# Patient Record
Sex: FEMALE | Race: WHITE | NOT HISPANIC OR LATINO | Employment: FULL TIME | URBAN - METROPOLITAN AREA
[De-identification: names, ages, dates, MRNs, and addresses within clinical notes are randomized per-mention and may not be internally consistent; named-entity substitution may affect disease eponyms.]

---

## 2017-01-27 ENCOUNTER — TRANSCRIBE ORDERS (OUTPATIENT)
Dept: ADMINISTRATIVE | Facility: HOSPITAL | Age: 62
End: 2017-01-27

## 2017-01-27 DIAGNOSIS — E89.40: ICD-10-CM

## 2017-01-27 DIAGNOSIS — Z13.820 SCREENING FOR OSTEOPOROSIS: Primary | ICD-10-CM

## 2017-02-24 ENCOUNTER — OFFICE VISIT (OUTPATIENT)
Dept: NEUROLOGY | Facility: CLINIC | Age: 62
End: 2017-02-24

## 2017-02-24 ENCOUNTER — APPOINTMENT (OUTPATIENT)
Dept: LAB | Facility: HOSPITAL | Age: 62
End: 2017-02-24

## 2017-02-24 VITALS
HEART RATE: 62 BPM | WEIGHT: 141.4 LBS | SYSTOLIC BLOOD PRESSURE: 122 MMHG | OXYGEN SATURATION: 98 % | BODY MASS INDEX: 26.02 KG/M2 | DIASTOLIC BLOOD PRESSURE: 76 MMHG | HEIGHT: 62 IN

## 2017-02-24 DIAGNOSIS — G25.0 ESSENTIAL TREMOR: ICD-10-CM

## 2017-02-24 DIAGNOSIS — G35 MULTIPLE SCLEROSIS (HCC): Primary | ICD-10-CM

## 2017-02-24 LAB
ALBUMIN SERPL-MCNC: 4.3 G/DL (ref 3.2–4.8)
ALBUMIN/GLOB SERPL: 1.5 G/DL (ref 1.5–2.5)
ALP SERPL-CCNC: 74 U/L (ref 25–100)
ALT SERPL W P-5'-P-CCNC: 20 U/L (ref 7–40)
ANION GAP SERPL CALCULATED.3IONS-SCNC: 8 MMOL/L (ref 3–11)
AST SERPL-CCNC: 27 U/L (ref 0–33)
BASOPHILS # BLD AUTO: 0.03 10*3/MM3 (ref 0–0.2)
BASOPHILS NFR BLD AUTO: 0.5 % (ref 0–1)
BILIRUB SERPL-MCNC: 0.4 MG/DL (ref 0.3–1.2)
BUN BLD-MCNC: 11 MG/DL (ref 9–23)
BUN/CREAT SERPL: 18.3 (ref 7–25)
CALCIUM SPEC-SCNC: 9.5 MG/DL (ref 8.7–10.4)
CHLORIDE SERPL-SCNC: 107 MMOL/L (ref 99–109)
CO2 SERPL-SCNC: 25 MMOL/L (ref 20–31)
CREAT BLD-MCNC: 0.6 MG/DL (ref 0.6–1.3)
DEPRECATED RDW RBC AUTO: 42.2 FL (ref 37–54)
EOSINOPHIL # BLD AUTO: 0.1 10*3/MM3 (ref 0.1–0.3)
EOSINOPHIL NFR BLD AUTO: 1.7 % (ref 0–3)
ERYTHROCYTE [DISTWIDTH] IN BLOOD BY AUTOMATED COUNT: 13 % (ref 11.3–14.5)
GFR SERPL CREATININE-BSD FRML MDRD: 101 ML/MIN/1.73
GLOBULIN UR ELPH-MCNC: 2.9 GM/DL
GLUCOSE BLD-MCNC: 82 MG/DL (ref 70–100)
HCT VFR BLD AUTO: 40.1 % (ref 34.5–44)
HGB BLD-MCNC: 13.9 G/DL (ref 11.5–15.5)
IMM GRANULOCYTES # BLD: 0.02 10*3/MM3 (ref 0–0.03)
IMM GRANULOCYTES NFR BLD: 0.3 % (ref 0–0.6)
LYMPHOCYTES # BLD AUTO: 1.26 10*3/MM3 (ref 0.6–4.8)
LYMPHOCYTES NFR BLD AUTO: 21.4 % (ref 24–44)
MCH RBC QN AUTO: 31 PG (ref 27–31)
MCHC RBC AUTO-ENTMCNC: 34.7 G/DL (ref 32–36)
MCV RBC AUTO: 89.3 FL (ref 80–99)
MONOCYTES # BLD AUTO: 0.64 10*3/MM3 (ref 0–1)
MONOCYTES NFR BLD AUTO: 10.9 % (ref 0–12)
NEUTROPHILS # BLD AUTO: 3.84 10*3/MM3 (ref 1.5–8.3)
NEUTROPHILS NFR BLD AUTO: 65.2 % (ref 41–71)
PLATELET # BLD AUTO: 175 10*3/MM3 (ref 150–450)
PMV BLD AUTO: 10.9 FL (ref 6–12)
POTASSIUM BLD-SCNC: 4.2 MMOL/L (ref 3.5–5.5)
PROT SERPL-MCNC: 7.2 G/DL (ref 5.7–8.2)
RBC # BLD AUTO: 4.49 10*6/MM3 (ref 3.89–5.14)
SODIUM BLD-SCNC: 140 MMOL/L (ref 132–146)
WBC NRBC COR # BLD: 5.89 10*3/MM3 (ref 3.5–10.8)

## 2017-02-24 PROCEDURE — 85025 COMPLETE CBC W/AUTO DIFF WBC: CPT | Performed by: PSYCHIATRY & NEUROLOGY

## 2017-02-24 PROCEDURE — 99213 OFFICE O/P EST LOW 20 MIN: CPT | Performed by: PSYCHIATRY & NEUROLOGY

## 2017-02-24 PROCEDURE — 36415 COLL VENOUS BLD VENIPUNCTURE: CPT | Performed by: PSYCHIATRY & NEUROLOGY

## 2017-02-24 PROCEDURE — 80053 COMPREHEN METABOLIC PANEL: CPT | Performed by: PSYCHIATRY & NEUROLOGY

## 2017-02-24 RX ORDER — RENAGEL 800 MG/1
1 TABLET ORAL DAILY
COMMUNITY
Start: 2017-01-30 | End: 2017-11-24 | Stop reason: SDUPTHER

## 2017-02-24 NOTE — PROGRESS NOTES
Subjective:     Patient ID: Nicky Owens is a 61 y.o. female.    History of Present Illness     59 yo woman with RRMS and essential tremors returns in follow up.  Last visit on 12/16/16 started Aubagio, primidone.    MRI Brain/cervical 11/11/16 minimal T2 lesion load and C1 lesion unchanged, no new, enlarging or enhancing lesions in comparison to 9/28/15    CBC 12/16/16 lymph 790  CMP - NCS   TB - neg    RRMS    Fatigue improved off Tecfidera.  Aubagio for last 15 days.  Appetite improved off Tecfidera.   Ring worm biopsied and extreme dermatitis.   Sensations in legs/feet of muscles twitching and cramping only slight.       Essential Tremor    Stopped Primidone 25 mg BID as tremor has resolved.        The following portions of the patient's history were reviewed and updated as appropriate: allergies, current medications, past medical history, past surgical history and problem list.    Review of Systems   Constitutional: Negative for activity change and unexpected weight change.   HENT: Negative for tinnitus and trouble swallowing.    Eyes: Negative for photophobia and visual disturbance.   Respiratory: Negative for apnea and choking.    Cardiovascular: Negative for leg swelling.   Endocrine: Positive for heat intolerance. Negative for cold intolerance.   Genitourinary: Negative for difficulty urinating, frequency, menstrual problem and urgency.   Musculoskeletal: Negative for back pain and gait problem.   Skin: Negative for color change.   Allergic/Immunologic: Negative for immunocompromised state.   Neurological: Negative for dizziness, tremors, seizures, syncope, facial asymmetry, speech difficulty and light-headedness.   Hematological: Negative for adenopathy. Does not bruise/bleed easily.   Psychiatric/Behavioral: Negative for behavioral problems, confusion, decreased concentration, hallucinations and sleep disturbance.        Objective:  Vitals:    02/24/17 1336   BP: 122/76   Pulse: 62   SpO2: 98%  "  Weight: 141 lb 6.4 oz (64.1 kg)   Height: 62\" (157.5 cm)       Neurologic Exam     Mental Status   Attention: normal. Concentration: normal.   Level of consciousness: alert  Knowledge: good and consistent with education.   Normal comprehension.     Cranial Nerves     CN II   Visual fields full to confrontation.   Visual acuity: normal  Right visual field deficit: none  Left visual field deficit: none     CN III, IV, VI   Nystagmus: none   Diplopia: none  Ophthalmoparesis: none  Upgaze: normal  Downgaze: normal  Conjugate gaze: present    CN V   Facial sensation intact.   Right corneal reflex: normal  Left corneal reflex: normal    CN VII   Right facial weakness: none  Left facial weakness: none    CN VIII   Hearing: intact    CN IX, X   Palate: symmetric  Right gag reflex: normal  Left gag reflex: normal    CN XI   Right sternocleidomastoid strength: normal  Left sternocleidomastoid strength: normal    CN XII   Tongue: not atrophic  Fasciculations: absent  Tongue deviation: none    Motor Exam   Muscle bulk: normal  Overall muscle tone: normal  Right arm tone: normal  Left arm tone: normal  Right leg tone: normal  Left leg tone: normal    Sensory Exam   Light touch normal.     Gait, Coordination, and Reflexes     Tremor   Resting tremor: absent  Intention tremor: absent  Action tremor: absent    Reflexes   Reflexes 2+ except as noted.       Physical Exam    Assessment/Plan:       Problems Addressed this Visit        Nervous and Auditory    Essential tremor    Multiple sclerosis - Primary     Tolerating Aubagio without side effects.      CBC, CMP         Relevant Orders    CBC & Differential    Comprehensive Metabolic Panel                   "

## 2017-05-08 ENCOUNTER — APPOINTMENT (OUTPATIENT)
Dept: LAB | Facility: HOSPITAL | Age: 62
End: 2017-05-08

## 2017-05-08 ENCOUNTER — OFFICE VISIT (OUTPATIENT)
Dept: NEUROLOGY | Facility: CLINIC | Age: 62
End: 2017-05-08

## 2017-05-08 VITALS
BODY MASS INDEX: 25.65 KG/M2 | DIASTOLIC BLOOD PRESSURE: 74 MMHG | HEART RATE: 62 BPM | WEIGHT: 139.4 LBS | OXYGEN SATURATION: 97 % | SYSTOLIC BLOOD PRESSURE: 122 MMHG | HEIGHT: 62 IN

## 2017-05-08 DIAGNOSIS — R25.1 TREMOR: ICD-10-CM

## 2017-05-08 DIAGNOSIS — G35 MULTIPLE SCLEROSIS (HCC): Primary | ICD-10-CM

## 2017-05-08 LAB
ALBUMIN SERPL-MCNC: 4.2 G/DL (ref 3.2–4.8)
ALBUMIN/GLOB SERPL: 1.5 G/DL (ref 1.5–2.5)
ALP SERPL-CCNC: 73 U/L (ref 25–100)
ALT SERPL W P-5'-P-CCNC: 26 U/L (ref 7–40)
ANION GAP SERPL CALCULATED.3IONS-SCNC: 5 MMOL/L (ref 3–11)
AST SERPL-CCNC: 27 U/L (ref 0–33)
BASOPHILS # BLD AUTO: 0.04 10*3/MM3 (ref 0–0.2)
BASOPHILS NFR BLD AUTO: 0.9 % (ref 0–1)
BILIRUB SERPL-MCNC: 0.5 MG/DL (ref 0.3–1.2)
BUN BLD-MCNC: 11 MG/DL (ref 9–23)
BUN/CREAT SERPL: 15.7 (ref 7–25)
CALCIUM SPEC-SCNC: 9.3 MG/DL (ref 8.7–10.4)
CHLORIDE SERPL-SCNC: 110 MMOL/L (ref 99–109)
CO2 SERPL-SCNC: 28 MMOL/L (ref 20–31)
CREAT BLD-MCNC: 0.7 MG/DL (ref 0.6–1.3)
DEPRECATED RDW RBC AUTO: 41.7 FL (ref 37–54)
EOSINOPHIL # BLD AUTO: 0.09 10*3/MM3 (ref 0.1–0.3)
EOSINOPHIL NFR BLD AUTO: 1.9 % (ref 0–3)
ERYTHROCYTE [DISTWIDTH] IN BLOOD BY AUTOMATED COUNT: 12.8 % (ref 11.3–14.5)
GFR SERPL CREATININE-BSD FRML MDRD: 85 ML/MIN/1.73
GLOBULIN UR ELPH-MCNC: 2.8 GM/DL
GLUCOSE BLD-MCNC: 88 MG/DL (ref 70–100)
HCT VFR BLD AUTO: 38.2 % (ref 34.5–44)
HGB BLD-MCNC: 12.9 G/DL (ref 11.5–15.5)
IMM GRANULOCYTES # BLD: 0.01 10*3/MM3 (ref 0–0.03)
IMM GRANULOCYTES NFR BLD: 0.2 % (ref 0–0.6)
LYMPHOCYTES # BLD AUTO: 0.83 10*3/MM3 (ref 0.6–4.8)
LYMPHOCYTES NFR BLD AUTO: 17.8 % (ref 24–44)
MCH RBC QN AUTO: 30.4 PG (ref 27–31)
MCHC RBC AUTO-ENTMCNC: 33.8 G/DL (ref 32–36)
MCV RBC AUTO: 89.9 FL (ref 80–99)
MONOCYTES # BLD AUTO: 0.64 10*3/MM3 (ref 0–1)
MONOCYTES NFR BLD AUTO: 13.7 % (ref 0–12)
NEUTROPHILS # BLD AUTO: 3.05 10*3/MM3 (ref 1.5–8.3)
NEUTROPHILS NFR BLD AUTO: 65.5 % (ref 41–71)
PLATELET # BLD AUTO: 177 10*3/MM3 (ref 150–450)
PMV BLD AUTO: 12.1 FL (ref 6–12)
POTASSIUM BLD-SCNC: 4.1 MMOL/L (ref 3.5–5.5)
PROT SERPL-MCNC: 7 G/DL (ref 5.7–8.2)
RBC # BLD AUTO: 4.25 10*6/MM3 (ref 3.89–5.14)
SODIUM BLD-SCNC: 143 MMOL/L (ref 132–146)
WBC NRBC COR # BLD: 4.66 10*3/MM3 (ref 3.5–10.8)

## 2017-05-08 PROCEDURE — 36415 COLL VENOUS BLD VENIPUNCTURE: CPT | Performed by: PSYCHIATRY & NEUROLOGY

## 2017-05-08 PROCEDURE — 99214 OFFICE O/P EST MOD 30 MIN: CPT | Performed by: PSYCHIATRY & NEUROLOGY

## 2017-05-08 PROCEDURE — 85025 COMPLETE CBC W/AUTO DIFF WBC: CPT | Performed by: PSYCHIATRY & NEUROLOGY

## 2017-05-08 PROCEDURE — 80053 COMPREHEN METABOLIC PANEL: CPT | Performed by: PSYCHIATRY & NEUROLOGY

## 2017-06-23 ENCOUNTER — OFFICE VISIT (OUTPATIENT)
Dept: NEUROLOGY | Facility: CLINIC | Age: 62
End: 2017-06-23

## 2017-06-23 VITALS
OXYGEN SATURATION: 96 % | SYSTOLIC BLOOD PRESSURE: 120 MMHG | BODY MASS INDEX: 25.76 KG/M2 | WEIGHT: 140 LBS | DIASTOLIC BLOOD PRESSURE: 72 MMHG | HEART RATE: 62 BPM | HEIGHT: 62 IN

## 2017-06-23 DIAGNOSIS — R25.1 TREMOR: ICD-10-CM

## 2017-06-23 DIAGNOSIS — G25.0 ESSENTIAL TREMOR: ICD-10-CM

## 2017-06-23 DIAGNOSIS — G35 MULTIPLE SCLEROSIS (HCC): Primary | ICD-10-CM

## 2017-06-23 PROCEDURE — 99213 OFFICE O/P EST LOW 20 MIN: CPT | Performed by: PSYCHIATRY & NEUROLOGY

## 2017-06-23 NOTE — PROGRESS NOTES
Subjective:     Patient ID: Nicky Owens is a 61 y.o. female.    History of Present Illness     61 y.o.  yo woman with RRMS and essential tremors returns in follow up.  Last visit on 5/8/17 continued Aubagio, primidone and ordered labs.    MRI Brain/cervical 11/11/16 minimal T2 lesion load and C1 lesion unchanged, no new, enlarging or enhancing lesions in comparison to 9/28/15     5/8/17  CBC, CMP - NCS        RRMS    Left arm and leg N/T markedly decreased and occurs intermittently. Fatigue is mild.  Bladder increased urgency and frequency continues.  Denies heat intolerance.  Aubagio side effects are minimal.      Essential Tremor    Tremor resolved off medication    The following portions of the patient's history were reviewed and updated as appropriate: allergies, current medications, past medical history, past surgical history and problem list.    Review of Systems   Constitutional: Positive for fatigue. Negative for activity change and unexpected weight change.   HENT: Negative for tinnitus and trouble swallowing.    Eyes: Negative for photophobia and visual disturbance.   Respiratory: Negative for apnea and choking.    Cardiovascular: Negative for leg swelling.   Endocrine: Positive for heat intolerance. Negative for cold intolerance.   Genitourinary: Negative for difficulty urinating, frequency, menstrual problem and urgency.   Musculoskeletal: Negative for back pain and gait problem.   Skin: Negative for color change.   Allergic/Immunologic: Negative for immunocompromised state.   Neurological: Positive for tremors. Negative for dizziness, seizures, syncope, facial asymmetry, speech difficulty and light-headedness.   Hematological: Negative for adenopathy. Does not bruise/bleed easily.   Psychiatric/Behavioral: Negative for behavioral problems, confusion, decreased concentration, hallucinations and sleep disturbance.        Objective:  Vitals:    06/23/17 1354   BP: 120/72   Pulse: 62   SpO2: 96%  "  Weight: 140 lb (63.5 kg)   Height: 62\" (157.5 cm)       Neurologic Exam     Mental Status   Oriented to person, place, and time.   Registration: recalls 3 of 3 objects. Recall at 5 minutes: recalls 3 of 3 objects.   Attention: normal. Concentration: normal.   Speech: speech is normal   Level of consciousness: alert  Knowledge: good and consistent with education.   Normal comprehension.     Cranial Nerves     CN II   Visual fields full to confrontation.   Visual acuity: normal  Right visual field deficit: none  Left visual field deficit: none     CN III, IV, VI   Nystagmus: none   Diplopia: none  Ophthalmoparesis: none  Upgaze: normal  Downgaze: normal  Conjugate gaze: present    CN V   Facial sensation intact.   Right corneal reflex: normal  Left corneal reflex: normal    CN VII   Right facial weakness: none  Left facial weakness: none    CN VIII   Hearing: intact    CN IX, X   Palate: symmetric  Right gag reflex: normal  Left gag reflex: normal    CN XI   Right sternocleidomastoid strength: normal  Left sternocleidomastoid strength: normal    CN XII   Tongue: not atrophic  Fasciculations: absent  Tongue deviation: none    Motor Exam   Muscle bulk: normal  Overall muscle tone: normal  Right arm tone: normal  Left arm tone: normal  Right leg tone: normal  Left leg tone: normal    Sensory Exam   Light touch normal.     Gait, Coordination, and Reflexes     Tremor   Resting tremor: absent  Intention tremor: absent  Action tremor: absent    Reflexes   Reflexes 2+ except as noted.       Physical Exam   Constitutional: She is oriented to person, place, and time.   Neurological: She is oriented to person, place, and time.   Psychiatric: Her speech is normal.       Assessment/Plan:       Problems Addressed this Visit        Nervous and Auditory    Essential tremor     Stable off meds         Multiple sclerosis - Primary     Continue on Aubagio              Other    Tremor                   "

## 2017-08-11 ENCOUNTER — OFFICE VISIT (OUTPATIENT)
Dept: ENDOCRINOLOGY | Facility: CLINIC | Age: 62
End: 2017-08-11

## 2017-08-11 VITALS
WEIGHT: 138.1 LBS | HEART RATE: 58 BPM | OXYGEN SATURATION: 99 % | HEIGHT: 62 IN | SYSTOLIC BLOOD PRESSURE: 118 MMHG | BODY MASS INDEX: 25.41 KG/M2 | DIASTOLIC BLOOD PRESSURE: 70 MMHG

## 2017-08-11 DIAGNOSIS — E03.9 ACQUIRED HYPOTHYROIDISM: ICD-10-CM

## 2017-08-11 DIAGNOSIS — E78.2 MIXED HYPERLIPIDEMIA: ICD-10-CM

## 2017-08-11 DIAGNOSIS — I10 BENIGN ESSENTIAL HYPERTENSION: Primary | ICD-10-CM

## 2017-08-11 DIAGNOSIS — E55.9 VITAMIN D DEFICIENCY: ICD-10-CM

## 2017-08-11 LAB
25(OH)D3 SERPL-MCNC: 24.1 NG/ML
ARTICHOKE IGE QN: 161 MG/DL (ref 0–130)
CHOLEST SERPL-MCNC: 225 MG/DL (ref 0–200)
HDLC SERPL-MCNC: 46 MG/DL (ref 40–60)
T4 FREE SERPL-MCNC: 1.19 NG/DL (ref 0.89–1.76)
TRIGL SERPL-MCNC: 106 MG/DL (ref 0–150)
TSH SERPL DL<=0.05 MIU/L-ACNC: 4.24 MIU/ML (ref 0.35–5.35)

## 2017-08-11 PROCEDURE — 80061 LIPID PANEL: CPT | Performed by: INTERNAL MEDICINE

## 2017-08-11 PROCEDURE — 84443 ASSAY THYROID STIM HORMONE: CPT | Performed by: INTERNAL MEDICINE

## 2017-08-11 PROCEDURE — 82306 VITAMIN D 25 HYDROXY: CPT | Performed by: INTERNAL MEDICINE

## 2017-08-11 PROCEDURE — 99214 OFFICE O/P EST MOD 30 MIN: CPT | Performed by: INTERNAL MEDICINE

## 2017-08-11 PROCEDURE — 84439 ASSAY OF FREE THYROXINE: CPT | Performed by: INTERNAL MEDICINE

## 2017-08-11 NOTE — PROGRESS NOTES
Nicky Owens 61 y.o.  CC:Hypertension (Follow UP ) and Hypothyroidism    [place]: Hypertension (Follow UP ) and Hypothyroidism    bp is good   Energy is good as well   Is on abagio now for MS - felt better (less gi upset and more energy)  Moved over the summer  Sister stem cell transplant- myelodyplastic syndrome (older brother was a match)    Allergies   Allergen Reactions   • Penicillins        Current Outpatient Prescriptions:   •  AUBAGIO 14 MG tablet, Take 1 tablet by mouth Daily., Disp: , Rfl:   •  esomeprazole (nexIUM) 40 MG capsule, Take 1 capsule by mouth Daily., Disp: 30 capsule, Rfl: 5  •  estradiol (MINIVELLE, VIVELLE-DOT) 0.05 MG/24HR patch, Place 1 patch on the skin 1 (One) Time Per Week., Disp: , Rfl:   •  levothyroxine (SYNTHROID) 75 MCG tablet, Take 1 tablet by mouth Daily., Disp: 30 tablet, Rfl: 5  •  lisinopril (PRINIVIL,ZESTRIL) 10 MG tablet, Take 1 tablet by mouth Daily., Disp: 30 tablet, Rfl: 5  •  simvastatin (ZOCOR) 20 MG tablet, Take 1 tablet by mouth Daily., Disp: 90 tablet, Rfl: 1  Patient Active Problem List    Diagnosis   • Bruising [T14.8]   • Acute gastritis [K29.00]   • Abdominal pain RUQ [R10.9]   • Visit for screening mammogram [Z12.31]   • Ischemic stroke [I63.9]   • Anemia [D64.9]     Overview Note:     Impression: 06/08/2015 - stable cbc;      • Anorexia [R63.0]     Overview Note:     Impression: 06/08/2015 - stable weight and increase in appetite  Impression: 05/18/2015 - add ppi for 2-3 weeks;      • Anxiety [F41.9]     Overview Note:     Impression: 05/26/2015 - patient denies but in constant motion in office, appears agitated  tremor to outstretched hands  discussed change to bystolic for bp control and use valium prn   continue to advance activity as tolerated  contipation improving  stop minivelle and simvastatin for now;      • Benign essential hypertension [I10]     Overview Note:     Impression: 06/08/2015 - bp is good off medication  Impression: 04/03/2015 - bp is good   Impression: 07/05/2014 - bp is good today- continue to monitor;      • Bone cyst [M85.60]     Overview Note:     Description: hip     • H/O colonoscopy [Z98.890]     Overview Note:     Description: 2012 chris     • Degeneration of intervertebral disc of lumbar region [M51.36]   • Dysphagia [R13.10]     Overview Note:     Description: egd 9/15 gastritis without gastropathy     • Erosive esophagitis [K22.10]     Overview Note:     Impression: 10/09/2015 - neg biopsy for barretts- see above; Description: sophie egd 9/15- recc ppi daily     • Essential tremor [G25.0]     Overview Note:     Impression: 06/08/2015 - only since surgery- some cogwheeling  check medications in hospital  continue valium prn  refer neurology;      • Hyperlipidemia [E78.5]     Overview Note:     Impression: 10/09/2015 - update flp   ok to go back on chol lowering medication  Impression: 06/08/2015 - off statin  Impression: 04/03/2015 - check flp- due for appt  Impression: 07/05/2014 - check flp;      • Hyperreflexia [R29.2]   • Hypothyroidism [E03.9]     Overview Note:     Impression: 10/09/2015 - check tfts  Impression: 06/08/2015 - check tfts  Impression: 04/03/2015 - check tfts- due for appt  Impression: 07/05/2014 - check tft;      • Multiple sclerosis [G35]     Overview Note:     Impression: 10/09/2015 - started medication with Dr Donovan  has peace of mind about dx   referral created for [facility] for opinion but she is very happy with ongoing management from Dr Donovan;      • Heart murmur [R01.1]   • Muscle weakness [M62.81]     Overview Note:     Impression: 05/18/2015 - check cmp, cbc, tsh  suspect tsh is still low  difficult time recovering from surgery   check ekg  consider cat scan head if symptoms headache and weakness persist;      • Nausea [R11.0]     Overview Note:     Impression: 05/26/2015 - try zofran prn  reassured her that LFT are good  also discussed timing of recovery   and need for patience in improvement;       • Nonallopathic lesion of cervical region [M99.9]   • Osteoporosis [M81.0]     Overview Note:     Impression: 04/03/2015 - overdue for dexa- ?symptoms related to compression fx- if no relief/ etiology will get plain films; Description: Dexa 3/11     • Shoulder pain [M25.519]   • Weakness [R53.1]   • Vitamin D deficiency [E55.9]     Overview Note:     Impression: 10/09/2015 - on supplement now- update levels and discussed importance of supplementation given severe deficiency  Impression: 04/03/2015 - not currently on supplement; Description: repeat 6/15 low- recc rx     • Cobalamin deficiency [E53.8]     Overview Note:     Description: 7/15 lab show low B12 levels- otc supplement recc     • Tremor [R25.1]     Overview Note:     Impression: 05/18/2015 - check tsh;        Review of Systems   Constitutional: Negative for activity change, appetite change, chills, diaphoresis, fatigue, fever and unexpected weight change.   HENT: Negative for congestion, dental problem, drooling, ear discharge, ear pain, facial swelling, hearing loss, mouth sores, nosebleeds, postnasal drip, rhinorrhea, sinus pressure, sneezing, sore throat, tinnitus, trouble swallowing and voice change.    Eyes: Negative for photophobia, pain, discharge, redness, itching and visual disturbance.   Respiratory: Negative for apnea, cough, choking, chest tightness, shortness of breath, wheezing and stridor.    Cardiovascular: Negative for chest pain, palpitations and leg swelling.   Gastrointestinal: Negative for abdominal distention, abdominal pain, anal bleeding, blood in stool, constipation, diarrhea, nausea, rectal pain and vomiting.   Endocrine: Negative for cold intolerance, heat intolerance, polydipsia, polyphagia and polyuria.   Genitourinary: Negative for decreased urine volume, difficulty urinating, dysuria, enuresis, flank pain, frequency, genital sores, hematuria and urgency.   Musculoskeletal: Negative for arthralgias, back pain, gait problem,  "joint swelling, myalgias, neck pain and neck stiffness.        MS    Skin: Negative for color change, pallor, rash and wound.   Allergic/Immunologic: Negative for environmental allergies, food allergies and immunocompromised state.   Neurological: Negative for dizziness, tremors, seizures, syncope, facial asymmetry, speech difficulty, weakness, light-headedness, numbness and headaches.   Hematological: Negative for adenopathy. Does not bruise/bleed easily.   Psychiatric/Behavioral: Negative for agitation, behavioral problems, confusion, decreased concentration, dysphoric mood, hallucinations, self-injury, sleep disturbance and suicidal ideas. The patient is not nervous/anxious and is not hyperactive.      Social History     Social History   • Marital status:      Spouse name: N/A   • Number of children: N/A   • Years of education: N/A     Occupational History   • Not on file.     Social History Main Topics   • Smoking status: Never Smoker   • Smokeless tobacco: Never Used   • Alcohol use No   • Drug use: No   • Sexual activity: Not on file     Other Topics Concern   • Not on file     Social History Narrative     Family History   Problem Relation Age of Onset   • Heart disease Mother    • Hypertension Mother    • Heart disease Father    • Hypertension Father    • Hypertension Sister    • Other Sister      malignant neoplasm   • Mental illness Sister    • Stroke Brother    • Hypertension Brother    • Mental illness Brother    • Alzheimer's disease Other      Aunt     /70  Pulse 58  Ht 62\" (157.5 cm)  Wt 138 lb 1.6 oz (62.6 kg)  SpO2 99%  BMI 25.26 kg/m2  Physical Exam   Constitutional: She is oriented to person, place, and time. She appears well-developed and well-nourished.   HENT:   Head: Normocephalic and atraumatic.   Nose: Nose normal.   Mouth/Throat: Oropharynx is clear and moist.   Eyes: Conjunctivae, EOM and lids are normal. Pupils are equal, round, and reactive to light.   Neck: Trachea " normal and normal range of motion. Neck supple. Carotid bruit is not present. No tracheal deviation present. No thyroid mass and no thyromegaly present.   Cardiovascular: Normal rate, regular rhythm, normal heart sounds and intact distal pulses.  Exam reveals no gallop and no friction rub.    No murmur heard.  Pulmonary/Chest: Effort normal and breath sounds normal. No respiratory distress. She has no wheezes.   Musculoskeletal: Normal range of motion. She exhibits no edema or deformity.   Lymphadenopathy:     She has no cervical adenopathy.   Neurological: She is alert and oriented to person, place, and time. She has normal reflexes. She displays normal reflexes. No cranial nerve deficit.   Skin: Skin is warm and dry. No rash noted. No cyanosis or erythema. Nails show no clubbing.   Psychiatric: She has a normal mood and affect. Her speech is normal and behavior is normal. Judgment and thought content normal. Cognition and memory are normal.   Nursing note and vitals reviewed.    Problem List Items Addressed This Visit        Cardiovascular and Mediastinum    Benign essential hypertension - Primary     bp is good          Hyperlipidemia     Update flp - did not resume simvastatin   She will start it this time          Relevant Orders    Lipid Panel       Digestive    Vitamin D deficiency     Update vitamin D levels          Relevant Orders    Vitamin D 25 Hydroxy       Endocrine    Hypothyroidism     Update tfts          Relevant Orders    TSH    T4, Free          Results for orders placed or performed in visit on 05/08/17   Comprehensive Metabolic Panel   Result Value Ref Range    Glucose 88 70 - 100 mg/dL    BUN 11 9 - 23 mg/dL    Creatinine 0.70 0.60 - 1.30 mg/dL    Sodium 143 132 - 146 mmol/L    Potassium 4.1 3.5 - 5.5 mmol/L    Chloride 110 (H) 99 - 109 mmol/L    CO2 28.0 20.0 - 31.0 mmol/L    Calcium 9.3 8.7 - 10.4 mg/dL    Total Protein 7.0 5.7 - 8.2 g/dL    Albumin 4.20 3.20 - 4.80 g/dL    ALT (SGPT) 26 7 -  40 U/L    AST (SGOT) 27 0 - 33 U/L    Alkaline Phosphatase 73 25 - 100 U/L    Total Bilirubin 0.5 0.3 - 1.2 mg/dL    eGFR Non African Amer 85 >60 mL/min/1.73    Globulin 2.8 gm/dL    A/G Ratio 1.5 1.5 - 2.5 g/dL    BUN/Creatinine Ratio 15.7 7.0 - 25.0    Anion Gap 5.0 3.0 - 11.0 mmol/L   CBC Auto Differential   Result Value Ref Range    WBC 4.66 3.50 - 10.80 10*3/mm3    RBC 4.25 3.89 - 5.14 10*6/mm3    Hemoglobin 12.9 11.5 - 15.5 g/dL    Hematocrit 38.2 34.5 - 44.0 %    MCV 89.9 80.0 - 99.0 fL    MCH 30.4 27.0 - 31.0 pg    MCHC 33.8 32.0 - 36.0 g/dL    RDW 12.8 11.3 - 14.5 %    RDW-SD 41.7 37.0 - 54.0 fl    MPV 12.1 (H) 6.0 - 12.0 fL    Platelets 177 150 - 450 10*3/mm3    Neutrophil % 65.5 41.0 - 71.0 %    Lymphocyte % 17.8 (L) 24.0 - 44.0 %    Monocyte % 13.7 (H) 0.0 - 12.0 %    Eosinophil % 1.9 0.0 - 3.0 %    Basophil % 0.9 0.0 - 1.0 %    Immature Grans % 0.2 0.0 - 0.6 %    Neutrophils, Absolute 3.05 1.50 - 8.30 10*3/mm3    Lymphocytes, Absolute 0.83 0.60 - 4.80 10*3/mm3    Monocytes, Absolute 0.64 0.00 - 1.00 10*3/mm3    Eosinophils, Absolute 0.09 (L) 0.10 - 0.30 10*3/mm3    Basophils, Absolute 0.04 0.00 - 0.20 10*3/mm3    Immature Grans, Absolute 0.01 0.00 - 0.03 10*3/mm3     Return in about 6 months (around 2/11/2018) for Recheck 30 min .  Back on nexium for gerd, low acid diet discussed     May Berrios MA   Signed by Tierney Mata MD

## 2017-11-21 DIAGNOSIS — E03.8 SECONDARY HYPOTHYROIDISM: ICD-10-CM

## 2017-11-22 DIAGNOSIS — E03.8 SECONDARY HYPOTHYROIDISM: ICD-10-CM

## 2017-11-22 RX ORDER — SIMVASTATIN 20 MG
20 TABLET ORAL DAILY
Qty: 90 TABLET | Refills: 1 | Status: SHIPPED | OUTPATIENT
Start: 2017-11-22 | End: 2018-03-16 | Stop reason: SDUPTHER

## 2017-11-22 RX ORDER — LEVOTHYROXINE SODIUM 0.07 MG/1
75 TABLET ORAL DAILY
Qty: 30 TABLET | Refills: 5 | Status: SHIPPED | OUTPATIENT
Start: 2017-11-22 | End: 2018-03-16 | Stop reason: SDUPTHER

## 2017-11-22 RX ORDER — LISINOPRIL 10 MG/1
10 TABLET ORAL DAILY
Qty: 30 TABLET | Refills: 5 | Status: SHIPPED | OUTPATIENT
Start: 2017-11-22 | End: 2018-03-16 | Stop reason: SDUPTHER

## 2017-11-22 RX ORDER — LEVOTHYROXINE SODIUM 75 MCG
TABLET ORAL
Qty: 30 TABLET | Refills: 5 | Status: SHIPPED | OUTPATIENT
Start: 2017-11-22 | End: 2017-11-22 | Stop reason: SDUPTHER

## 2017-11-22 RX ORDER — LISINOPRIL 10 MG/1
TABLET ORAL
Qty: 30 TABLET | Refills: 5 | Status: SHIPPED | OUTPATIENT
Start: 2017-11-22 | End: 2017-11-22 | Stop reason: SDUPTHER

## 2017-11-27 RX ORDER — RENAGEL 800 MG/1
TABLET ORAL
Qty: 28 TABLET | Refills: 12 | Status: SHIPPED | OUTPATIENT
Start: 2017-11-27 | End: 2018-10-12 | Stop reason: SDUPTHER

## 2017-12-15 ENCOUNTER — LAB (OUTPATIENT)
Dept: LAB | Facility: HOSPITAL | Age: 62
End: 2017-12-15

## 2017-12-15 ENCOUNTER — OFFICE VISIT (OUTPATIENT)
Dept: NEUROLOGY | Facility: CLINIC | Age: 62
End: 2017-12-15

## 2017-12-15 VITALS
HEART RATE: 64 BPM | OXYGEN SATURATION: 98 % | SYSTOLIC BLOOD PRESSURE: 122 MMHG | HEIGHT: 62 IN | RESPIRATION RATE: 18 BRPM | WEIGHT: 142 LBS | BODY MASS INDEX: 26.13 KG/M2 | DIASTOLIC BLOOD PRESSURE: 72 MMHG

## 2017-12-15 DIAGNOSIS — G25.0 ESSENTIAL TREMOR: ICD-10-CM

## 2017-12-15 DIAGNOSIS — G35 MULTIPLE SCLEROSIS (HCC): Primary | ICD-10-CM

## 2017-12-15 DIAGNOSIS — G35 MULTIPLE SCLEROSIS (HCC): ICD-10-CM

## 2017-12-15 LAB
ALBUMIN SERPL-MCNC: 4 G/DL (ref 3.2–4.8)
ALBUMIN/GLOB SERPL: 1.6 G/DL (ref 1.5–2.5)
ALP SERPL-CCNC: 77 U/L (ref 25–100)
ALT SERPL W P-5'-P-CCNC: 44 U/L (ref 7–40)
ANION GAP SERPL CALCULATED.3IONS-SCNC: 4 MMOL/L (ref 3–11)
AST SERPL-CCNC: 38 U/L (ref 0–33)
BASOPHILS # BLD AUTO: 0.04 10*3/MM3 (ref 0–0.2)
BASOPHILS NFR BLD AUTO: 0.9 % (ref 0–1)
BILIRUB SERPL-MCNC: 0.4 MG/DL (ref 0.3–1.2)
BUN BLD-MCNC: 13 MG/DL (ref 9–23)
BUN/CREAT SERPL: 21.7 (ref 7–25)
CALCIUM SPEC-SCNC: 8.8 MG/DL (ref 8.7–10.4)
CHLORIDE SERPL-SCNC: 111 MMOL/L (ref 99–109)
CO2 SERPL-SCNC: 27 MMOL/L (ref 20–31)
CREAT BLD-MCNC: 0.6 MG/DL (ref 0.6–1.3)
DEPRECATED RDW RBC AUTO: 42.4 FL (ref 37–54)
EOSINOPHIL # BLD AUTO: 0.09 10*3/MM3 (ref 0–0.3)
EOSINOPHIL NFR BLD AUTO: 2 % (ref 0–3)
ERYTHROCYTE [DISTWIDTH] IN BLOOD BY AUTOMATED COUNT: 13.1 % (ref 11.3–14.5)
GFR SERPL CREATININE-BSD FRML MDRD: 101 ML/MIN/1.73
GLOBULIN UR ELPH-MCNC: 2.5 GM/DL
GLUCOSE BLD-MCNC: 93 MG/DL (ref 70–100)
HCT VFR BLD AUTO: 37.4 % (ref 34.5–44)
HGB BLD-MCNC: 12.4 G/DL (ref 11.5–15.5)
IMM GRANULOCYTES # BLD: 0.01 10*3/MM3 (ref 0–0.03)
IMM GRANULOCYTES NFR BLD: 0.2 % (ref 0–0.6)
LYMPHOCYTES # BLD AUTO: 0.8 10*3/MM3 (ref 0.6–4.8)
LYMPHOCYTES NFR BLD AUTO: 17.8 % (ref 24–44)
MCH RBC QN AUTO: 29.6 PG (ref 27–31)
MCHC RBC AUTO-ENTMCNC: 33.2 G/DL (ref 32–36)
MCV RBC AUTO: 89.3 FL (ref 80–99)
MONOCYTES # BLD AUTO: 0.68 10*3/MM3 (ref 0–1)
MONOCYTES NFR BLD AUTO: 15.1 % (ref 0–12)
NEUTROPHILS # BLD AUTO: 2.87 10*3/MM3 (ref 1.5–8.3)
NEUTROPHILS NFR BLD AUTO: 64 % (ref 41–71)
PLATELET # BLD AUTO: 191 10*3/MM3 (ref 150–450)
PMV BLD AUTO: 11.9 FL (ref 6–12)
POTASSIUM BLD-SCNC: 3.9 MMOL/L (ref 3.5–5.5)
PROT SERPL-MCNC: 6.5 G/DL (ref 5.7–8.2)
RBC # BLD AUTO: 4.19 10*6/MM3 (ref 3.89–5.14)
SODIUM BLD-SCNC: 142 MMOL/L (ref 132–146)
WBC NRBC COR # BLD: 4.49 10*3/MM3 (ref 3.5–10.8)

## 2017-12-15 PROCEDURE — 36415 COLL VENOUS BLD VENIPUNCTURE: CPT

## 2017-12-15 PROCEDURE — 80053 COMPREHEN METABOLIC PANEL: CPT

## 2017-12-15 PROCEDURE — 85025 COMPLETE CBC W/AUTO DIFF WBC: CPT

## 2017-12-15 PROCEDURE — 99214 OFFICE O/P EST MOD 30 MIN: CPT | Performed by: PSYCHIATRY & NEUROLOGY

## 2017-12-15 NOTE — PROGRESS NOTES
Subjective:   Chief Complaint   Patient presents with   • Multiple Sclerosis       Patient ID: Nicky Owens is a 61 y.o. female.    History of Present Illness     61 y.o.  yo woman with RRMS and essential tremors returns in follow up.  Last visit on 6/23/17 continued Aubagio, and ordered labs.    MRI Brain/cervical 11/11/16 minimal T2 lesion load and C1 lesion unchanged, no new, enlarging or enhancing lesions in comparison to 9/28/15     5/8/17  CBC, CMP - NCS      RRMS    Sx of fatigue has lessened.  Appetite improving.  Left arm and leg N/T resolved.  Feet cramping resolved.   Occurred in last 3 months.       Aubagio side effects are minimal.      Essential Tremor    Tremor resolved off medication    The following portions of the patient's history were reviewed and updated as appropriate: allergies, current medications, past medical history, past surgical history and problem list.    Review of Systems   Constitutional: Positive for fatigue. Negative for activity change and unexpected weight change.   HENT: Negative for tinnitus and trouble swallowing.    Eyes: Negative for photophobia and visual disturbance.   Respiratory: Negative for apnea and choking.    Cardiovascular: Negative for leg swelling.   Endocrine: Positive for heat intolerance. Negative for cold intolerance.   Genitourinary: Negative for difficulty urinating, frequency, menstrual problem and urgency.   Musculoskeletal: Negative for back pain and gait problem.   Skin: Negative for color change.   Allergic/Immunologic: Negative for immunocompromised state.   Neurological: Positive for tremors and numbness. Negative for dizziness, seizures, syncope, facial asymmetry, speech difficulty and light-headedness.   Hematological: Negative for adenopathy. Does not bruise/bleed easily.   Psychiatric/Behavioral: Negative for behavioral problems, confusion, decreased concentration, hallucinations and sleep disturbance.        Objective:  Vitals:    12/15/17  "1404   BP: 122/72   BP Location: Left arm   Patient Position: Sitting   Cuff Size: Adult   Pulse: 64   Resp: 18   SpO2: 98%   Weight: 64.4 kg (142 lb)   Height: 157.5 cm (62\")       Neurologic Exam     Mental Status   Oriented to person, place, and time.   Registration: recalls 3 of 3 objects. Recall at 5 minutes: recalls 3 of 3 objects.   Attention: normal. Concentration: normal.   Speech: speech is normal   Level of consciousness: alert  Knowledge: good and consistent with education.   Normal comprehension.     Cranial Nerves     CN II   Visual fields full to confrontation.   Visual acuity: normal  Right visual field deficit: none  Left visual field deficit: none     CN III, IV, VI   Nystagmus: none   Diplopia: none  Ophthalmoparesis: none  Upgaze: normal  Downgaze: normal  Conjugate gaze: present    CN V   Facial sensation intact.   Right corneal reflex: normal  Left corneal reflex: normal    CN VII   Right facial weakness: none  Left facial weakness: none    CN VIII   Hearing: intact    CN IX, X   Palate: symmetric  Right gag reflex: normal  Left gag reflex: normal    CN XI   Right sternocleidomastoid strength: normal  Left sternocleidomastoid strength: normal    CN XII   Tongue: not atrophic  Fasciculations: absent  Tongue deviation: none    Motor Exam   Muscle bulk: normal  Overall muscle tone: normal  Right arm tone: normal  Left arm tone: normal  Right leg tone: normal  Left leg tone: normal    Sensory Exam   Light touch normal.     Gait, Coordination, and Reflexes     Tremor   Resting tremor: absent  Intention tremor: absent  Action tremor: absent    Reflexes   Reflexes 2+ except as noted.       Physical Exam   Constitutional: She is oriented to person, place, and time.   Neurological: She is oriented to person, place, and time.   Psychiatric: Her speech is normal.       Assessment/Plan:       Problems Addressed this Visit        Nervous and Auditory    Essential tremor     Resolved with tx of MS          " Multiple sclerosis - Primary     Sx improved on Aubagio     CBC, CMP  MRI Brain          Relevant Orders    MRI Brain With & Without Contrast    CBC & Differential    Comprehensive Metabolic Panel

## 2017-12-20 ENCOUNTER — TELEPHONE (OUTPATIENT)
Dept: NEUROLOGY | Facility: CLINIC | Age: 62
End: 2017-12-20

## 2017-12-20 NOTE — TELEPHONE ENCOUNTER
Returning patients call to advise her that MRI is still pending with insurance at this time. No answer

## 2017-12-26 ENCOUNTER — HOSPITAL ENCOUNTER (OUTPATIENT)
Dept: MRI IMAGING | Facility: HOSPITAL | Age: 62
Discharge: HOME | End: 2017-12-26
Attending: PSYCHIATRY & NEUROLOGY | Admitting: PSYCHIATRY & NEUROLOGY

## 2017-12-26 DIAGNOSIS — G35 MULTIPLE SCLEROSIS (HCC): ICD-10-CM

## 2017-12-26 PROCEDURE — A9577 INJ MULTIHANCE: HCPCS | Performed by: PSYCHIATRY & NEUROLOGY

## 2017-12-26 PROCEDURE — 0 GADOBENATE DIMEGLUMINE 529 MG/ML SOLUTION: Performed by: PSYCHIATRY & NEUROLOGY

## 2017-12-26 PROCEDURE — 70553 MRI BRAIN STEM W/O & W/DYE: CPT

## 2017-12-26 RX ADMIN — GADOBENATE DIMEGLUMINE 13 ML: 529 INJECTION, SOLUTION INTRAVENOUS at 15:00

## 2018-01-19 ENCOUNTER — OFFICE VISIT (OUTPATIENT)
Dept: NEUROLOGY | Facility: CLINIC | Age: 63
End: 2018-01-19

## 2018-01-19 VITALS
SYSTOLIC BLOOD PRESSURE: 122 MMHG | DIASTOLIC BLOOD PRESSURE: 74 MMHG | WEIGHT: 142 LBS | HEART RATE: 68 BPM | OXYGEN SATURATION: 98 % | BODY MASS INDEX: 26.13 KG/M2 | HEIGHT: 62 IN

## 2018-01-19 DIAGNOSIS — G25.0 ESSENTIAL TREMOR: ICD-10-CM

## 2018-01-19 DIAGNOSIS — G35 MULTIPLE SCLEROSIS (HCC): Primary | ICD-10-CM

## 2018-01-19 PROCEDURE — 99213 OFFICE O/P EST LOW 20 MIN: CPT | Performed by: PSYCHIATRY & NEUROLOGY

## 2018-01-19 NOTE — PROGRESS NOTES
Subjective:   Chief Complaint   Patient presents with   • Multiple Sclerosis       Patient ID: Nicky Owens is a 62 y.o. female.    History of Present Illness     62 y.o.  yo woman with RRMS and essential tremors returns in follow up.  Last visit on 6/23/17 continued Aubagio, and ordered labs.    MRI Brain, my review of films, 12/26/17 C1 lesion resolved, no new, enlarging or enhancing lesions in comparison to 11/11/16     12/15/17    ALT 44, AST 38, CBC - NCS      RRMS    Fatigue is minimal.  No new or worsening sx     Aubagio side effects are minimal.      Essential Tremor    Tremor resolved off medication    The following portions of the patient's history were reviewed and updated as appropriate: allergies, current medications, past medical history, past surgical history and problem list.    Review of Systems   Constitutional: Negative for activity change and unexpected weight change.   HENT: Negative for tinnitus and trouble swallowing.    Eyes: Negative for photophobia and visual disturbance.   Respiratory: Negative for apnea and choking.    Cardiovascular: Negative for leg swelling.   Endocrine: Positive for heat intolerance. Negative for cold intolerance.   Genitourinary: Negative for difficulty urinating, frequency, menstrual problem and urgency.   Musculoskeletal: Negative for back pain and gait problem.   Skin: Negative for color change.   Allergic/Immunologic: Negative for immunocompromised state.   Neurological: Positive for tremors. Negative for dizziness, seizures, syncope, facial asymmetry, speech difficulty and light-headedness.   Hematological: Negative for adenopathy. Does not bruise/bleed easily.   Psychiatric/Behavioral: Negative for behavioral problems, confusion, decreased concentration, hallucinations and sleep disturbance.        Objective:  Vitals:    01/19/18 1150   BP: 122/74   BP Location: Left arm   Patient Position: Sitting   Cuff Size: Adult   Pulse: 68   SpO2: 98%   Weight: 64.4  "kg (142 lb)   Height: 157.5 cm (62.01\")       Neurologic Exam     Mental Status   Oriented to person, place, and time.   Registration: recalls 3 of 3 objects. Recall at 5 minutes: recalls 3 of 3 objects.   Attention: normal. Concentration: normal.   Speech: speech is normal   Level of consciousness: alert  Knowledge: good and consistent with education.   Normal comprehension.     Cranial Nerves     CN II   Visual fields full to confrontation.   Visual acuity: normal  Right visual field deficit: none  Left visual field deficit: none     CN III, IV, VI   Nystagmus: none   Diplopia: none  Ophthalmoparesis: none  Upgaze: normal  Downgaze: normal  Conjugate gaze: present    CN V   Facial sensation intact.   Right corneal reflex: normal  Left corneal reflex: normal    CN VII   Right facial weakness: none  Left facial weakness: none    CN VIII   Hearing: intact    CN IX, X   Palate: symmetric  Right gag reflex: normal  Left gag reflex: normal    CN XI   Right sternocleidomastoid strength: normal  Left sternocleidomastoid strength: normal    CN XII   Tongue: not atrophic  Fasciculations: absent  Tongue deviation: none    Motor Exam   Muscle bulk: normal  Overall muscle tone: normal  Right arm tone: normal  Left arm tone: normal  Right leg tone: normal  Left leg tone: normal    Sensory Exam   Light touch normal.     Gait, Coordination, and Reflexes     Tremor   Resting tremor: absent  Intention tremor: absent  Action tremor: absent    Reflexes   Reflexes 2+ except as noted.       Physical Exam   Constitutional: She is oriented to person, place, and time.   Neurological: She is oriented to person, place, and time.   Psychiatric: Her speech is normal.       Assessment/Plan:       Problems Addressed this Visit        Nervous and Auditory    Essential tremor     Stable with tx of RRMS          Multiple sclerosis - Primary     MRI and labs are stable on Aubagio                             "

## 2018-02-02 ENCOUNTER — TRANSCRIBE ORDERS (OUTPATIENT)
Dept: ADMINISTRATIVE | Facility: HOSPITAL | Age: 63
End: 2018-02-02

## 2018-02-02 DIAGNOSIS — N95.1 MENOPAUSAL STATE: Primary | ICD-10-CM

## 2018-03-16 ENCOUNTER — OFFICE VISIT (OUTPATIENT)
Dept: ENDOCRINOLOGY | Facility: CLINIC | Age: 63
End: 2018-03-16

## 2018-03-16 ENCOUNTER — TELEPHONE (OUTPATIENT)
Dept: ENDOCRINOLOGY | Facility: CLINIC | Age: 63
End: 2018-03-16

## 2018-03-16 VITALS
WEIGHT: 142 LBS | OXYGEN SATURATION: 98 % | HEART RATE: 65 BPM | HEIGHT: 62 IN | SYSTOLIC BLOOD PRESSURE: 110 MMHG | DIASTOLIC BLOOD PRESSURE: 60 MMHG | BODY MASS INDEX: 26.13 KG/M2

## 2018-03-16 DIAGNOSIS — I10 BENIGN ESSENTIAL HYPERTENSION: ICD-10-CM

## 2018-03-16 DIAGNOSIS — R39.15 URINARY URGENCY: ICD-10-CM

## 2018-03-16 DIAGNOSIS — E03.8 SECONDARY HYPOTHYROIDISM: ICD-10-CM

## 2018-03-16 DIAGNOSIS — G35 MULTIPLE SCLEROSIS (HCC): ICD-10-CM

## 2018-03-16 DIAGNOSIS — E03.9 ACQUIRED HYPOTHYROIDISM: Primary | ICD-10-CM

## 2018-03-16 DIAGNOSIS — E78.2 MIXED HYPERLIPIDEMIA: ICD-10-CM

## 2018-03-16 LAB
ALBUMIN SERPL-MCNC: 4 G/DL (ref 3.2–4.8)
ALBUMIN/GLOB SERPL: 1.5 G/DL (ref 1.5–2.5)
ALP SERPL-CCNC: 72 U/L (ref 25–100)
ALT SERPL W P-5'-P-CCNC: 29 U/L (ref 7–40)
ANION GAP SERPL CALCULATED.3IONS-SCNC: 5 MMOL/L (ref 3–11)
ARTICHOKE IGE QN: 114 MG/DL (ref 0–130)
AST SERPL-CCNC: 25 U/L (ref 0–33)
BILIRUB SERPL-MCNC: 0.5 MG/DL (ref 0.3–1.2)
BUN BLD-MCNC: 11 MG/DL (ref 9–23)
BUN/CREAT SERPL: 18.3 (ref 7–25)
CALCIUM SPEC-SCNC: 8.5 MG/DL (ref 8.7–10.4)
CHLORIDE SERPL-SCNC: 110 MMOL/L (ref 99–109)
CHOLEST SERPL-MCNC: 165 MG/DL (ref 0–200)
CO2 SERPL-SCNC: 27 MMOL/L (ref 20–31)
CREAT BLD-MCNC: 0.6 MG/DL (ref 0.6–1.3)
GFR SERPL CREATININE-BSD FRML MDRD: 101 ML/MIN/1.73
GLOBULIN UR ELPH-MCNC: 2.6 GM/DL
GLUCOSE BLD-MCNC: 84 MG/DL (ref 70–100)
HDLC SERPL-MCNC: 49 MG/DL (ref 40–60)
POTASSIUM BLD-SCNC: 4.2 MMOL/L (ref 3.5–5.5)
PROT SERPL-MCNC: 6.6 G/DL (ref 5.7–8.2)
SODIUM BLD-SCNC: 142 MMOL/L (ref 132–146)
T4 FREE SERPL-MCNC: 1.23 NG/DL (ref 0.89–1.76)
TRIGL SERPL-MCNC: 78 MG/DL (ref 0–150)
TSH SERPL DL<=0.05 MIU/L-ACNC: 5.69 MIU/ML (ref 0.35–5.35)

## 2018-03-16 PROCEDURE — 99214 OFFICE O/P EST MOD 30 MIN: CPT | Performed by: INTERNAL MEDICINE

## 2018-03-16 PROCEDURE — 80061 LIPID PANEL: CPT | Performed by: INTERNAL MEDICINE

## 2018-03-16 PROCEDURE — 80053 COMPREHEN METABOLIC PANEL: CPT | Performed by: INTERNAL MEDICINE

## 2018-03-16 PROCEDURE — 84443 ASSAY THYROID STIM HORMONE: CPT | Performed by: INTERNAL MEDICINE

## 2018-03-16 PROCEDURE — 84439 ASSAY OF FREE THYROXINE: CPT | Performed by: INTERNAL MEDICINE

## 2018-03-16 RX ORDER — LEVOTHYROXINE SODIUM 0.07 MG/1
75 TABLET ORAL DAILY
Qty: 30 TABLET | Refills: 5 | Status: SHIPPED | OUTPATIENT
Start: 2018-03-16 | End: 2018-03-16 | Stop reason: SDUPTHER

## 2018-03-16 RX ORDER — LISINOPRIL 10 MG/1
10 TABLET ORAL DAILY
Qty: 30 TABLET | Refills: 5 | Status: SHIPPED | OUTPATIENT
Start: 2018-03-16 | End: 2018-12-11 | Stop reason: SDUPTHER

## 2018-03-16 RX ORDER — LEVOTHYROXINE SODIUM 88 UG/1
88 TABLET ORAL DAILY
Qty: 90 TABLET | Refills: 1 | Status: SHIPPED | OUTPATIENT
Start: 2018-03-16 | End: 2018-11-29 | Stop reason: DRUGHIGH

## 2018-03-16 RX ORDER — CONJUGATED ESTROGENS 0.62 MG/G
CREAM VAGINAL
COMMUNITY
Start: 2018-02-02 | End: 2018-11-29

## 2018-03-16 RX ORDER — FESOTERODINE FUMARATE 4 MG/1
4 TABLET, FILM COATED, EXTENDED RELEASE ORAL
COMMUNITY
Start: 2018-03-04

## 2018-03-16 RX ORDER — SIMVASTATIN 20 MG
20 TABLET ORAL DAILY
Qty: 30 TABLET | Refills: 5 | Status: SHIPPED | OUTPATIENT
Start: 2018-03-16 | End: 2019-01-12 | Stop reason: SDUPTHER

## 2018-03-16 RX ORDER — FLUCONAZOLE 100 MG/1
TABLET ORAL
COMMUNITY
Start: 2018-02-02 | End: 2018-11-29

## 2018-03-16 NOTE — PROGRESS NOTES
Nicky Owens 62 y.o.  [facility]:Follow-up; Hypertension; Hypothyroidism; Multiple Sclerosis; and Hyperlipidemia      [facility]: Follow-up; Hypertension; Hypothyroidism; Multiple Sclerosis; and Hyperlipidemia    bp is good   Is on low fat diet   Energy fair- stable MS symptoms on abbagio  Healthy overall- no cold or flu   Was having urinary stress incont and urgency- Dr Null added toviaz with good effect     Allergies   Allergen Reactions   • Penicillins        Current Outpatient Prescriptions:   •  AUBAGIO 14 MG tablet, TAKE 14 MG (1 TABLET) ONCE DAILY, Disp: 28 tablet, Rfl: 12  •  esomeprazole (nexIUM) 40 MG capsule, Take 1 capsule by mouth Daily. (Patient taking differently: Take 40 mg by mouth Daily As Needed.), Disp: 30 capsule, Rfl: 5  •  estradiol (MINIVELLE, VIVELLE-DOT) 0.05 MG/24HR patch, Place 1 patch on the skin 1 (One) Time Per Week., Disp: , Rfl:   •  levothyroxine (SYNTHROID) 75 MCG tablet, Take 1 tablet by mouth Daily., Disp: 30 tablet, Rfl: 5  •  lisinopril (PRINIVIL,ZESTRIL) 10 MG tablet, Take 1 tablet by mouth Daily., Disp: 30 tablet, Rfl: 5  •  simvastatin (ZOCOR) 20 MG tablet, Take 1 tablet by mouth Daily., Disp: 30 tablet, Rfl: 5  Patient Active Problem List    Diagnosis   • Bruising [T14.8XXA]   • Acute gastritis [K29.00]   • Abdominal pain RUQ [R10.9]   • Visit for screening mammogram [Z12.31]   • Ischemic stroke [I63.9]   • Anemia [D64.9]     Overview Note:     Impression: 06/08/2015 - stable cbc;      • Anorexia [R63.0]     Overview Note:     Impression: 06/08/2015 - stable weight and increase in appetite  Impression: 05/18/2015 - add ppi for 2-3 weeks;      • Anxiety [F41.9]     Overview Note:     Impression: 05/26/2015 - patient denies but in constant motion in office, appears agitated  tremor to outstretched hands  discussed change to bystolic for bp control and use valium prn   continue to advance activity as tolerated  contipation improving  stop minivelle and simvastatin for now;      •  Benign essential hypertension [I10]     Overview Note:     Impression: 06/08/2015 - bp is good off medication  Impression: 04/03/2015 - bp is good  Impression: 07/05/2014 - bp is good today- continue to monitor;      • Bone cyst [M85.60]     Overview Note:     Description: hip     • H/O colonoscopy [Z98.890]     Overview Note:     Description: 2012 chris  Fecal incontinence  Colonoscopy/ evaluation Dr Kent 11/17- repeat in 7 years      • Degeneration of intervertebral disc of lumbar region [M51.36]   • Dysphagia [R13.10]     Overview Note:     Description: egd 9/15 gastritis without gastropathy     • Erosive esophagitis [K22.10]     Overview Note:     Impression: 10/09/2015 - neg biopsy for barretts- see above; Description: sophie egd 9/15- recc ppi daily     • Essential tremor [G25.0]     Overview Note:     Impression: 06/08/2015 - only since surgery- some cogwheeling  check medications in hospital  continue valium prn  refer neurology;      • Hyperlipidemia [E78.5]     Overview Note:     Impression: 10/09/2015 - update flp   ok to go back on chol lowering medication  Impression: 06/08/2015 - off statin  Impression: 04/03/2015 - check flp- due for appt  Impression: 07/05/2014 - check flp;      • Hyperreflexia [R29.2]   • Hypothyroidism [E03.9]     Overview Note:     Impression: 10/09/2015 - check tfts  Impression: 06/08/2015 - check tfts  Impression: 04/03/2015 - check tfts- due for appt  Impression: 07/05/2014 - check tft;      • Multiple sclerosis [G35]     Overview Note:     Impression: 10/09/2015 - started medication with Dr Donovan  has peace of mind about dx   referral created for [facility] for opinion but she is very happy with ongoing management from Dr Donovan;      • Heart murmur [R01.1]   • Muscle weakness [M62.81]     Overview Note:     Impression: 05/18/2015 - check cmp, cbc, tsh  suspect tsh is still low  difficult time recovering from surgery   check ekg  consider cat scan head if symptoms  headache and weakness persist;      • Nausea [R11.0]     Overview Note:     Impression: 05/26/2015 - try zofran prn  reassured her that LFT are good  also discussed timing of recovery   and need for patience in improvement;      • Nonallopathic lesion of cervical region [M99.9]   • Osteoporosis [M81.0]     Overview Note:     Impression: 04/03/2015 - overdue for dexa- ?symptoms related to compression fx- if no relief/ etiology will get plain films; Description: Dexa 3/11     • Shoulder pain [M25.519]   • Weakness [R53.1]   • Vitamin D deficiency [E55.9]     Overview Note:     Impression: 10/09/2015 - on supplement now- update levels and discussed importance of supplementation given severe deficiency  Impression: 04/03/2015 - not currently on supplement; Description: repeat 6/15 low- recc rx     • Cobalamin deficiency [E53.8]     Overview Note:     Description: 7/15 lab show low B12 levels- otc supplement recc     • Tremor [R25.1]     Overview Note:     Impression: 05/18/2015 - check tsh;        Review of Systems   Constitutional: Negative for activity change, appetite change, chills, diaphoresis, fatigue, fever and unexpected weight change.   HENT: Negative for congestion, dental problem, drooling, ear discharge, ear pain, facial swelling, hearing loss, mouth sores, nosebleeds, postnasal drip, rhinorrhea, sinus pressure, sneezing, sore throat, tinnitus, trouble swallowing and voice change.    Eyes: Negative for photophobia, pain, discharge, redness, itching and visual disturbance.   Respiratory: Negative for apnea, cough, choking, chest tightness, shortness of breath, wheezing and stridor.    Cardiovascular: Negative for chest pain, palpitations and leg swelling.   Gastrointestinal: Negative for abdominal distention, abdominal pain, anal bleeding, blood in stool, constipation, diarrhea, nausea, rectal pain and vomiting.   Endocrine: Negative for cold intolerance, heat intolerance, polydipsia, polyphagia and polyuria.  "  Genitourinary: Negative for decreased urine volume, difficulty urinating, dysuria, enuresis, flank pain, frequency, genital sores, hematuria and urgency.   Musculoskeletal: Negative for arthralgias, back pain, gait problem, joint swelling, myalgias, neck pain and neck stiffness.   Skin: Negative for color change, pallor, rash and wound.   Allergic/Immunologic: Negative for environmental allergies, food allergies and immunocompromised state.   Neurological: Negative for dizziness, tremors, seizures, syncope, facial asymmetry, speech difficulty, weakness, light-headedness, numbness and headaches.   Hematological: Negative for adenopathy. Does not bruise/bleed easily.   Psychiatric/Behavioral: Negative for agitation, behavioral problems, confusion, decreased concentration, dysphoric mood, hallucinations, self-injury, sleep disturbance and suicidal ideas. The patient is not nervous/anxious and is not hyperactive.      Social History     Social History   • Marital status:      Spouse name: N/A   • Number of children: N/A   • Years of education: N/A     Occupational History   • Not on file.     Social History Main Topics   • Smoking status: Never Smoker   • Smokeless tobacco: Never Used   • Alcohol use No   • Drug use: No   • Sexual activity: Not on file     Other Topics Concern   • Not on file     Social History Narrative   • No narrative on file     Family History   Problem Relation Age of Onset   • Heart disease Mother    • Hypertension Mother    • Heart disease Father    • Hypertension Father    • Hypertension Sister    • Other Sister      malignant neoplasm   • Mental illness Sister    • Stroke Brother    • Hypertension Brother    • Mental illness Brother    • Alzheimer's disease Other      Aunt     /60   Pulse 65   Ht 157.5 cm (62\")   Wt 64.4 kg (142 lb)   SpO2 98%   BMI 25.97 kg/m²   Physical Exam   Constitutional: She is oriented to person, place, and time. She appears well-developed and " well-nourished.   HENT:   Head: Normocephalic and atraumatic.   Nose: Nose normal.   Mouth/Throat: Oropharynx is clear and moist.   Eyes: Conjunctivae, EOM and lids are normal. Pupils are equal, round, and reactive to light.   Neck: Trachea normal and normal range of motion. Neck supple. Carotid bruit is not present. No tracheal deviation present. No thyroid mass and no thyromegaly present.   Cardiovascular: Normal rate, regular rhythm, normal heart sounds and intact distal pulses.  Exam reveals no gallop and no friction rub.    No murmur heard.  Pulmonary/Chest: Effort normal and breath sounds normal. No respiratory distress. She has no wheezes.   Musculoskeletal: Normal range of motion. She exhibits no edema or deformity.   Lymphadenopathy:     She has no cervical adenopathy.   Neurological: She is alert and oriented to person, place, and time. She has normal reflexes. She displays normal reflexes. No cranial nerve deficit.   Skin: Skin is warm and dry. No rash noted. No cyanosis or erythema. Nails show no clubbing.   Psychiatric: She has a normal mood and affect. Her speech is normal and behavior is normal. Judgment and thought content normal. Cognition and memory are normal.   Nursing note and vitals reviewed.    Results for orders placed or performed in visit on 12/15/17   Comprehensive Metabolic Panel   Result Value Ref Range    Glucose 93 70 - 100 mg/dL    BUN 13 9 - 23 mg/dL    Creatinine 0.60 0.60 - 1.30 mg/dL    Sodium 142 132 - 146 mmol/L    Potassium 3.9 3.5 - 5.5 mmol/L    Chloride 111 (H) 99 - 109 mmol/L    CO2 27.0 20.0 - 31.0 mmol/L    Calcium 8.8 8.7 - 10.4 mg/dL    Total Protein 6.5 5.7 - 8.2 g/dL    Albumin 4.00 3.20 - 4.80 g/dL    ALT (SGPT) 44 (H) 7 - 40 U/L    AST (SGOT) 38 (H) 0 - 33 U/L    Alkaline Phosphatase 77 25 - 100 U/L    Total Bilirubin 0.4 0.3 - 1.2 mg/dL    eGFR Non African Amer 101 >60 mL/min/1.73    Globulin 2.5 gm/dL    A/G Ratio 1.6 1.5 - 2.5 g/dL    BUN/Creatinine Ratio 21.7  7.0 - 25.0    Anion Gap 4.0 3.0 - 11.0 mmol/L   CBC Auto Differential   Result Value Ref Range    WBC 4.49 3.50 - 10.80 10*3/mm3    RBC 4.19 3.89 - 5.14 10*6/mm3    Hemoglobin 12.4 11.5 - 15.5 g/dL    Hematocrit 37.4 34.5 - 44.0 %    MCV 89.3 80.0 - 99.0 fL    MCH 29.6 27.0 - 31.0 pg    MCHC 33.2 32.0 - 36.0 g/dL    RDW 13.1 11.3 - 14.5 %    RDW-SD 42.4 37.0 - 54.0 fl    MPV 11.9 6.0 - 12.0 fL    Platelets 191 150 - 450 10*3/mm3    Neutrophil % 64.0 41.0 - 71.0 %    Lymphocyte % 17.8 (L) 24.0 - 44.0 %    Monocyte % 15.1 (H) 0.0 - 12.0 %    Eosinophil % 2.0 0.0 - 3.0 %    Basophil % 0.9 0.0 - 1.0 %    Immature Grans % 0.2 0.0 - 0.6 %    Neutrophils, Absolute 2.87 1.50 - 8.30 10*3/mm3    Lymphocytes, Absolute 0.80 0.60 - 4.80 10*3/mm3    Monocytes, Absolute 0.68 0.00 - 1.00 10*3/mm3    Eosinophils, Absolute 0.09 0.00 - 0.30 10*3/mm3    Basophils, Absolute 0.04 0.00 - 0.20 10*3/mm3    Immature Grans, Absolute 0.01 0.00 - 0.03 10*3/mm3     Nicky was seen today for follow-up, hypertension, hypothyroidism, multiple sclerosis and hyperlipidemia.    Diagnoses and all orders for this visit:    Secondary hypothyroidism  -     levothyroxine (SYNTHROID) 75 MCG tablet; Take 1 tablet by mouth Daily.    Other orders  -     lisinopril (PRINIVIL,ZESTRIL) 10 MG tablet; Take 1 tablet by mouth Daily.  -     simvastatin (ZOCOR) 20 MG tablet; Take 1 tablet by mouth Daily.      Problem List Items Addressed This Visit        Cardiovascular and Mediastinum    Hyperlipidemia     Check flp          Relevant Medications    simvastatin (ZOCOR) 20 MG tablet    Other Relevant Orders    Lipid Panel (Completed)    Benign essential hypertension     bp Is good- continue to monitor          Relevant Medications    lisinopril (PRINIVIL,ZESTRIL) 10 MG tablet    Other Relevant Orders    Comprehensive Metabolic Panel (Completed)       Endocrine    Hypothyroidism - Primary     Check tfts             Nervous and Auditory    Multiple sclerosis     Doing  well on current medication            Genitourinary    Urinary urgency     Better with ovidio from Dr Null            Other Visit Diagnoses     Secondary hypothyroidism        Relevant Orders    TSH (Completed)    T4, Free (Completed)        Return in about 6 months (around 9/16/2018) for Recheck 30 min .    Jennifer Rubi MA  Signed Tierney Mata MD

## 2018-03-30 ENCOUNTER — HOSPITAL ENCOUNTER (OUTPATIENT)
Dept: BONE DENSITY | Facility: HOSPITAL | Age: 63
Discharge: HOME | End: 2018-03-30
Attending: OBSTETRICS & GYNECOLOGY | Admitting: OBSTETRICS & GYNECOLOGY

## 2018-03-30 DIAGNOSIS — N95.1 MENOPAUSAL STATE: ICD-10-CM

## 2018-03-30 PROCEDURE — 77080 DXA BONE DENSITY AXIAL: CPT

## 2018-08-02 ENCOUNTER — TELEPHONE (OUTPATIENT)
Dept: INTERNAL MEDICINE | Facility: CLINIC | Age: 63
End: 2018-08-02

## 2018-08-02 NOTE — TELEPHONE ENCOUNTER
Byron  calling from [facility] would like to get a call back in regards to patient and would like to get a call back at [phone] ext [phone].

## 2018-08-03 NOTE — TELEPHONE ENCOUNTER
I lmom to advise Byron we do not disclose pt's medical information on the phone and advised him to send a request with pt's authorization and information needed via fax

## 2018-08-31 ENCOUNTER — OFFICE VISIT (OUTPATIENT)
Dept: NEUROLOGY | Facility: CLINIC | Age: 63
End: 2018-08-31

## 2018-08-31 ENCOUNTER — LAB (OUTPATIENT)
Dept: LAB | Facility: HOSPITAL | Age: 63
End: 2018-08-31

## 2018-08-31 VITALS
HEART RATE: 66 BPM | SYSTOLIC BLOOD PRESSURE: 108 MMHG | WEIGHT: 141 LBS | OXYGEN SATURATION: 99 % | DIASTOLIC BLOOD PRESSURE: 68 MMHG | HEIGHT: 62 IN | RESPIRATION RATE: 16 BRPM | BODY MASS INDEX: 25.95 KG/M2

## 2018-08-31 DIAGNOSIS — G35 MULTIPLE SCLEROSIS (HCC): Primary | ICD-10-CM

## 2018-08-31 DIAGNOSIS — G25.0 ESSENTIAL TREMOR: ICD-10-CM

## 2018-08-31 DIAGNOSIS — G35 MULTIPLE SCLEROSIS (HCC): ICD-10-CM

## 2018-08-31 DIAGNOSIS — R25.1 TREMOR: ICD-10-CM

## 2018-08-31 LAB
ALBUMIN SERPL-MCNC: 4.22 G/DL (ref 3.2–4.8)
ALBUMIN/GLOB SERPL: 2 G/DL (ref 1.5–2.5)
ALP SERPL-CCNC: 67 U/L (ref 25–100)
ALT SERPL W P-5'-P-CCNC: 21 U/L (ref 7–40)
ANION GAP SERPL CALCULATED.3IONS-SCNC: 3 MMOL/L (ref 3–11)
AST SERPL-CCNC: 23 U/L (ref 0–33)
BASOPHILS # BLD AUTO: 0.04 10*3/MM3 (ref 0–0.2)
BASOPHILS NFR BLD AUTO: 0.9 % (ref 0–1)
BILIRUB SERPL-MCNC: 0.5 MG/DL (ref 0.3–1.2)
BUN BLD-MCNC: 11 MG/DL (ref 9–23)
BUN/CREAT SERPL: 15.7 (ref 7–25)
CALCIUM SPEC-SCNC: 8.6 MG/DL (ref 8.7–10.4)
CHLORIDE SERPL-SCNC: 109 MMOL/L (ref 99–109)
CO2 SERPL-SCNC: 27 MMOL/L (ref 20–31)
CREAT BLD-MCNC: 0.7 MG/DL (ref 0.6–1.3)
DEPRECATED RDW RBC AUTO: 42.8 FL (ref 37–54)
EOSINOPHIL # BLD AUTO: 0.12 10*3/MM3 (ref 0–0.3)
EOSINOPHIL NFR BLD AUTO: 2.6 % (ref 0–3)
ERYTHROCYTE [DISTWIDTH] IN BLOOD BY AUTOMATED COUNT: 13.1 % (ref 11.3–14.5)
GFR SERPL CREATININE-BSD FRML MDRD: 85 ML/MIN/1.73
GLOBULIN UR ELPH-MCNC: 2.1 GM/DL
GLUCOSE BLD-MCNC: 84 MG/DL (ref 70–100)
HCT VFR BLD AUTO: 38.4 % (ref 34.5–44)
HGB BLD-MCNC: 12.5 G/DL (ref 11.5–15.5)
IMM GRANULOCYTES # BLD: 0.01 10*3/MM3 (ref 0–0.03)
IMM GRANULOCYTES NFR BLD: 0.2 % (ref 0–0.6)
LYMPHOCYTES # BLD AUTO: 1.16 10*3/MM3 (ref 0.6–4.8)
LYMPHOCYTES NFR BLD AUTO: 24.9 % (ref 24–44)
MCH RBC QN AUTO: 28.9 PG (ref 27–31)
MCHC RBC AUTO-ENTMCNC: 32.6 G/DL (ref 32–36)
MCV RBC AUTO: 88.9 FL (ref 80–99)
MONOCYTES # BLD AUTO: 0.55 10*3/MM3 (ref 0–1)
MONOCYTES NFR BLD AUTO: 11.8 % (ref 0–12)
NEUTROPHILS # BLD AUTO: 2.78 10*3/MM3 (ref 1.5–8.3)
NEUTROPHILS NFR BLD AUTO: 59.8 % (ref 41–71)
PLATELET # BLD AUTO: 177 10*3/MM3 (ref 150–450)
PMV BLD AUTO: 12.3 FL (ref 6–12)
POTASSIUM BLD-SCNC: 3.9 MMOL/L (ref 3.5–5.5)
PROT SERPL-MCNC: 6.3 G/DL (ref 5.7–8.2)
RBC # BLD AUTO: 4.32 10*6/MM3 (ref 3.89–5.14)
SODIUM BLD-SCNC: 139 MMOL/L (ref 132–146)
WBC NRBC COR # BLD: 4.65 10*3/MM3 (ref 3.5–10.8)

## 2018-08-31 PROCEDURE — 85025 COMPLETE CBC W/AUTO DIFF WBC: CPT

## 2018-08-31 PROCEDURE — 99213 OFFICE O/P EST LOW 20 MIN: CPT | Performed by: PSYCHIATRY & NEUROLOGY

## 2018-08-31 PROCEDURE — 80053 COMPREHEN METABOLIC PANEL: CPT

## 2018-08-31 PROCEDURE — 36415 COLL VENOUS BLD VENIPUNCTURE: CPT

## 2018-10-12 NOTE — TELEPHONE ENCOUNTER
RECEIVED A CALL FROM [facility] STATING THAT THEY SWITCHED TO [facility]. SENT IN THE MEDICATION FOR THE PATIENT TO THE NEW PHARMACY.

## 2018-10-29 RX ORDER — RENAGEL 800 MG/1
TABLET ORAL
Qty: 28 TABLET | Refills: 12 | OUTPATIENT
Start: 2018-10-29

## 2018-11-29 ENCOUNTER — OFFICE VISIT (OUTPATIENT)
Dept: ENDOCRINOLOGY | Facility: CLINIC | Age: 63
End: 2018-11-29

## 2018-11-29 ENCOUNTER — TELEPHONE (OUTPATIENT)
Dept: ENDOCRINOLOGY | Facility: CLINIC | Age: 63
End: 2018-11-29

## 2018-11-29 VITALS
OXYGEN SATURATION: 98 % | HEART RATE: 68 BPM | WEIGHT: 142.4 LBS | BODY MASS INDEX: 26.2 KG/M2 | HEIGHT: 62 IN | SYSTOLIC BLOOD PRESSURE: 142 MMHG | DIASTOLIC BLOOD PRESSURE: 42 MMHG

## 2018-11-29 DIAGNOSIS — E53.8 COBALAMIN DEFICIENCY: ICD-10-CM

## 2018-11-29 DIAGNOSIS — E03.9 ACQUIRED HYPOTHYROIDISM: ICD-10-CM

## 2018-11-29 DIAGNOSIS — I10 BENIGN ESSENTIAL HYPERTENSION: Primary | ICD-10-CM

## 2018-11-29 DIAGNOSIS — E78.2 MIXED HYPERLIPIDEMIA: ICD-10-CM

## 2018-11-29 DIAGNOSIS — E55.9 VITAMIN D DEFICIENCY: ICD-10-CM

## 2018-11-29 LAB
ALBUMIN SERPL-MCNC: 4.16 G/DL (ref 3.2–4.8)
ALBUMIN/GLOB SERPL: 2.3 G/DL (ref 1.5–2.5)
ALP SERPL-CCNC: 60 U/L (ref 25–100)
ALT SERPL W P-5'-P-CCNC: 24 U/L (ref 7–40)
ANION GAP SERPL CALCULATED.3IONS-SCNC: 4 MMOL/L (ref 3–11)
ARTICHOKE IGE QN: 102 MG/DL (ref 0–130)
AST SERPL-CCNC: 23 U/L (ref 0–33)
BILIRUB SERPL-MCNC: 0.5 MG/DL (ref 0.3–1.2)
BUN BLD-MCNC: 12 MG/DL (ref 9–23)
BUN/CREAT SERPL: 18.2 (ref 7–25)
CA-I SERPL ISE-MCNC: 1.3 MMOL/L (ref 1.12–1.32)
CALCIUM SPEC-SCNC: 8.7 MG/DL (ref 8.7–10.4)
CHLORIDE SERPL-SCNC: 109 MMOL/L (ref 99–109)
CHOLEST SERPL-MCNC: 161 MG/DL (ref 0–200)
CO2 SERPL-SCNC: 29 MMOL/L (ref 20–31)
CREAT BLD-MCNC: 0.66 MG/DL (ref 0.6–1.3)
GFR SERPL CREATININE-BSD FRML MDRD: 90 ML/MIN/1.73
GLOBULIN UR ELPH-MCNC: 1.8 GM/DL
GLUCOSE BLD-MCNC: 81 MG/DL (ref 70–100)
HDLC SERPL-MCNC: 48 MG/DL (ref 40–60)
POTASSIUM BLD-SCNC: 4.3 MMOL/L (ref 3.5–5.5)
PROT SERPL-MCNC: 6 G/DL (ref 5.7–8.2)
SODIUM BLD-SCNC: 142 MMOL/L (ref 132–146)
TRIGL SERPL-MCNC: 76 MG/DL (ref 0–150)
TSH SERPL DL<=0.05 MIU/L-ACNC: 1.46 MIU/ML (ref 0.35–5.35)

## 2018-11-29 PROCEDURE — 90471 IMMUNIZATION ADMIN: CPT | Performed by: INTERNAL MEDICINE

## 2018-11-29 PROCEDURE — 90632 HEPA VACCINE ADULT IM: CPT | Performed by: INTERNAL MEDICINE

## 2018-11-29 PROCEDURE — 80061 LIPID PANEL: CPT | Performed by: INTERNAL MEDICINE

## 2018-11-29 PROCEDURE — 80053 COMPREHEN METABOLIC PANEL: CPT | Performed by: INTERNAL MEDICINE

## 2018-11-29 PROCEDURE — 82330 ASSAY OF CALCIUM: CPT | Performed by: INTERNAL MEDICINE

## 2018-11-29 PROCEDURE — 99214 OFFICE O/P EST MOD 30 MIN: CPT | Performed by: INTERNAL MEDICINE

## 2018-11-29 PROCEDURE — 84443 ASSAY THYROID STIM HORMONE: CPT | Performed by: INTERNAL MEDICINE

## 2018-11-29 RX ORDER — LEVOTHYROXINE SODIUM 75 MCG
TABLET ORAL
COMMUNITY
Start: 2018-11-11 | End: 2019-05-21 | Stop reason: SDUPTHER

## 2018-12-03 ENCOUNTER — TELEPHONE (OUTPATIENT)
Dept: NEUROLOGY | Facility: CLINIC | Age: 63
End: 2018-12-03

## 2018-12-03 NOTE — TELEPHONE ENCOUNTER
Called the patient, spoke with the patient, patient is aware of the provider instructions. Patient verbalized understanding.

## 2018-12-03 NOTE — TELEPHONE ENCOUNTER
[facility] called, stated that there was a major interaction between Aubagio and Simvastatin. Wanted to make sure the provider wanted to be aware that she was on the medication and that it was ok for the patient to take. Please advise if you would like to continue the patient on the medication. Thanks!!

## 2018-12-03 NOTE — ASSESSMENT & PLAN NOTE
Discussed lab work with him- he is concerned about fibrate raising cholesterol  Discussed cholesterol metabolism   Discussed risk of high triglycerides (pancreatitis)- signs and symptoms reviewed along with risk

## 2018-12-11 DIAGNOSIS — E03.8 SECONDARY HYPOTHYROIDISM: ICD-10-CM

## 2018-12-11 RX ORDER — LEVOTHYROXINE SODIUM 75 MCG
TABLET ORAL
Qty: 30 TABLET | Refills: 4 | Status: SHIPPED | OUTPATIENT
Start: 2018-12-11 | End: 2019-03-08 | Stop reason: SDUPTHER

## 2018-12-11 RX ORDER — LISINOPRIL 10 MG/1
TABLET ORAL
Qty: 30 TABLET | Refills: 4 | Status: SHIPPED | OUTPATIENT
Start: 2018-12-11 | End: 2019-05-22 | Stop reason: SDUPTHER

## 2018-12-11 NOTE — TELEPHONE ENCOUNTER
PATIENT WOULD LIKE TO GET A REFILL ON HER SYNTHROID 75 MG MEDICATION AND ALSO A REFILL ON HER LISINOPRIL 10 MG MEDICATION. THE PATIENT STATES THAT THIS MEDICATION WAS SUPPOSED TO HAVE BEEN SENT AT HER LOV. PT MAY BE REACHED [phone]

## 2019-01-14 RX ORDER — SIMVASTATIN 20 MG
TABLET ORAL
Qty: 30 TABLET | Refills: 4 | Status: SHIPPED | OUTPATIENT
Start: 2019-01-14 | End: 2019-05-31 | Stop reason: SDUPTHER

## 2019-03-08 ENCOUNTER — OFFICE VISIT (OUTPATIENT)
Dept: NEUROLOGY | Facility: CLINIC | Age: 64
End: 2019-03-08

## 2019-03-08 VITALS
RESPIRATION RATE: 16 BRPM | SYSTOLIC BLOOD PRESSURE: 132 MMHG | HEIGHT: 62 IN | WEIGHT: 143 LBS | BODY MASS INDEX: 26.31 KG/M2 | HEART RATE: 72 BPM | DIASTOLIC BLOOD PRESSURE: 74 MMHG

## 2019-03-08 DIAGNOSIS — G25.0 ESSENTIAL TREMOR: ICD-10-CM

## 2019-03-08 DIAGNOSIS — E55.9 VITAMIN D DEFICIENCY: ICD-10-CM

## 2019-03-08 DIAGNOSIS — R39.15 URINARY URGENCY: ICD-10-CM

## 2019-03-08 DIAGNOSIS — G35 MULTIPLE SCLEROSIS (HCC): Primary | ICD-10-CM

## 2019-03-08 PROCEDURE — 99214 OFFICE O/P EST MOD 30 MIN: CPT | Performed by: PSYCHIATRY & NEUROLOGY

## 2019-03-11 ENCOUNTER — SPECIALTY PHARMACY (OUTPATIENT)
Dept: ONCOLOGY | Facility: HOSPITAL | Age: 64
End: 2019-03-11

## 2019-03-28 ENCOUNTER — LAB (OUTPATIENT)
Dept: LAB | Facility: HOSPITAL | Age: 64
End: 2019-03-28

## 2019-03-28 ENCOUNTER — HOSPITAL ENCOUNTER (OUTPATIENT)
Dept: MRI IMAGING | Facility: HOSPITAL | Age: 64
Discharge: HOME | End: 2019-03-28
Admitting: PSYCHIATRY & NEUROLOGY

## 2019-03-28 DIAGNOSIS — G35 MULTIPLE SCLEROSIS (HCC): ICD-10-CM

## 2019-03-28 LAB
25(OH)D3 SERPL-MCNC: 17.8 NG/ML
ALBUMIN SERPL-MCNC: 4.22 G/DL (ref 3.2–4.8)
ALBUMIN/GLOB SERPL: 1.9 G/DL (ref 1.5–2.5)
ALP SERPL-CCNC: 80 U/L (ref 25–100)
ALT SERPL W P-5'-P-CCNC: 53 U/L (ref 7–40)
ANION GAP SERPL CALCULATED.3IONS-SCNC: 9 MMOL/L (ref 3–11)
AST SERPL-CCNC: 33 U/L (ref 0–33)
BASOPHILS # BLD AUTO: 0.03 10*3/MM3 (ref 0–0.2)
BASOPHILS NFR BLD AUTO: 0.7 % (ref 0–1)
BILIRUB SERPL-MCNC: 0.6 MG/DL (ref 0.3–1.2)
BUN BLD-MCNC: 11 MG/DL (ref 9–23)
BUN/CREAT SERPL: 15.9 (ref 7–25)
CALCIUM SPEC-SCNC: 8.6 MG/DL (ref 8.7–10.4)
CHLORIDE SERPL-SCNC: 106 MMOL/L (ref 99–109)
CO2 SERPL-SCNC: 25 MMOL/L (ref 20–31)
CREAT BLD-MCNC: 0.69 MG/DL (ref 0.6–1.3)
CREAT BLDA-MCNC: 0.7 MG/DL (ref 0.6–1.3)
DEPRECATED RDW RBC AUTO: 44.1 FL (ref 37–54)
EOSINOPHIL # BLD AUTO: 0.15 10*3/MM3 (ref 0–0.3)
EOSINOPHIL NFR BLD AUTO: 3.5 % (ref 0–3)
ERYTHROCYTE [DISTWIDTH] IN BLOOD BY AUTOMATED COUNT: 13.3 % (ref 11.3–14.5)
GFR SERPL CREATININE-BSD FRML MDRD: 86 ML/MIN/1.73
GLOBULIN UR ELPH-MCNC: 2.2 GM/DL
GLUCOSE BLD-MCNC: 87 MG/DL (ref 70–100)
HCT VFR BLD AUTO: 40.5 % (ref 34.5–44)
HGB BLD-MCNC: 13.3 G/DL (ref 11.5–15.5)
IMM GRANULOCYTES # BLD AUTO: 0.01 10*3/MM3 (ref 0–0.05)
IMM GRANULOCYTES NFR BLD AUTO: 0.2 % (ref 0–0.6)
LYMPHOCYTES # BLD AUTO: 1.14 10*3/MM3 (ref 0.6–4.8)
LYMPHOCYTES NFR BLD AUTO: 27 % (ref 24–44)
MCH RBC QN AUTO: 29.8 PG (ref 27–31)
MCHC RBC AUTO-ENTMCNC: 32.8 G/DL (ref 32–36)
MCV RBC AUTO: 90.6 FL (ref 80–99)
MONOCYTES # BLD AUTO: 0.52 10*3/MM3 (ref 0–1)
MONOCYTES NFR BLD AUTO: 12.3 % (ref 0–12)
NEUTROPHILS # BLD AUTO: 2.39 10*3/MM3 (ref 1.5–8.3)
NEUTROPHILS NFR BLD AUTO: 56.5 % (ref 41–71)
PLATELET # BLD AUTO: 197 10*3/MM3 (ref 150–450)
PMV BLD AUTO: 12.6 FL (ref 6–12)
POTASSIUM BLD-SCNC: 4 MMOL/L (ref 3.5–5.5)
PROT SERPL-MCNC: 6.4 G/DL (ref 5.7–8.2)
RBC # BLD AUTO: 4.47 10*6/MM3 (ref 3.89–5.14)
SODIUM BLD-SCNC: 140 MMOL/L (ref 132–146)
WBC NRBC COR # BLD: 4.23 10*3/MM3 (ref 3.5–10.8)

## 2019-03-28 PROCEDURE — 36415 COLL VENOUS BLD VENIPUNCTURE: CPT

## 2019-03-28 PROCEDURE — 82306 VITAMIN D 25 HYDROXY: CPT

## 2019-03-28 PROCEDURE — A9577 INJ MULTIHANCE: HCPCS | Performed by: PSYCHIATRY & NEUROLOGY

## 2019-03-28 PROCEDURE — 85025 COMPLETE CBC W/AUTO DIFF WBC: CPT

## 2019-03-28 PROCEDURE — 70553 MRI BRAIN STEM W/O & W/DYE: CPT

## 2019-03-28 PROCEDURE — 0 GADOBENATE DIMEGLUMINE 529 MG/ML SOLUTION: Performed by: PSYCHIATRY & NEUROLOGY

## 2019-03-28 PROCEDURE — 82565 ASSAY OF CREATININE: CPT

## 2019-03-28 PROCEDURE — 80053 COMPREHEN METABOLIC PANEL: CPT

## 2019-03-28 RX ADMIN — GADOBENATE DIMEGLUMINE 13 ML: 529 INJECTION, SOLUTION INTRAVENOUS at 10:44

## 2019-04-12 ENCOUNTER — OFFICE VISIT (OUTPATIENT)
Dept: NEUROLOGY | Facility: CLINIC | Age: 64
End: 2019-04-12

## 2019-04-12 VITALS
WEIGHT: 143 LBS | SYSTOLIC BLOOD PRESSURE: 122 MMHG | RESPIRATION RATE: 16 BRPM | OXYGEN SATURATION: 97 % | HEIGHT: 62 IN | HEART RATE: 72 BPM | BODY MASS INDEX: 26.31 KG/M2 | DIASTOLIC BLOOD PRESSURE: 74 MMHG

## 2019-04-12 DIAGNOSIS — G25.0 ESSENTIAL TREMOR: ICD-10-CM

## 2019-04-12 DIAGNOSIS — E55.9 AVITAMINOSIS D: ICD-10-CM

## 2019-04-12 DIAGNOSIS — G35 MULTIPLE SCLEROSIS (HCC): Primary | ICD-10-CM

## 2019-04-12 DIAGNOSIS — R39.15 URINARY URGENCY: ICD-10-CM

## 2019-04-12 PROCEDURE — 99214 OFFICE O/P EST MOD 30 MIN: CPT | Performed by: PSYCHIATRY & NEUROLOGY

## 2019-04-12 NOTE — PROGRESS NOTES
Subjective:   Chief Complaint   Patient presents with   • MRI Results       Patient ID: Nicky Owens is a 63 y.o. female.    History of Present Illness     63 y.o. woman with RRMS and essential tremors returns in follow up.  Last visit on 3/8/19 continued Aubagio, ordered MRI Brain, labs, referred to SLP.      MRI Brain, y review of films,  3/28/19 as compared to 12/26/17,  no new, enlarging or enhancing lesions       3/28/19: Vit D  17.8; ALT 53, AST 33, Cr 0.7     RRMS    Notices trouble focusing, decreased attention and concentration scheduled appt with SLP.      Fatigue is minimal.      No new or worsening sx     Tolerating Aubagio without side effects.      Essential Tremor    Tremor minimal    Urinary urgency     Toviaz improved frequency and urgrency         The following portions of the patient's history were reviewed and updated as appropriate: allergies, current medications, past medical history, past surgical history and problem list.    Review of Systems   Constitutional: Negative for activity change and unexpected weight change.   HENT: Negative for tinnitus and trouble swallowing.    Eyes: Negative for photophobia and visual disturbance.   Respiratory: Negative for apnea and choking.    Cardiovascular: Negative for leg swelling.   Endocrine: Positive for heat intolerance. Negative for cold intolerance.   Genitourinary: Positive for frequency and urgency. Negative for difficulty urinating and menstrual problem.   Musculoskeletal: Negative for back pain and gait problem.   Skin: Negative for color change.   Allergic/Immunologic: Negative for immunocompromised state.   Neurological: Negative for dizziness, tremors, seizures, syncope, facial asymmetry, speech difficulty and light-headedness.   Hematological: Negative for adenopathy. Does not bruise/bleed easily.   Psychiatric/Behavioral: Negative for behavioral problems, confusion, decreased concentration, hallucinations and sleep disturbance.     "    Objective:  Vitals:    04/12/19 1332   BP: 122/74   Pulse: 72   Resp: 16   SpO2: 97%   Weight: 64.9 kg (143 lb)   Height: 157.5 cm (62\")       Neurologic Exam     Mental Status   Oriented to person, place, and time.   Registration: recalls 3 of 3 objects. Recall at 5 minutes: recalls 3 of 3 objects.   Attention: normal. Concentration: normal.   Speech: speech is normal   Level of consciousness: alert  Knowledge: good and consistent with education.   Normal comprehension.     Cranial Nerves     CN II   Visual fields full to confrontation.   Visual acuity: normal  Right visual field deficit: none  Left visual field deficit: none     CN III, IV, VI   Nystagmus: none   Diplopia: none  Ophthalmoparesis: none  Upgaze: normal  Downgaze: normal  Conjugate gaze: present    CN V   Facial sensation intact.   Right corneal reflex: normal  Left corneal reflex: normal    CN VII   Right facial weakness: none  Left facial weakness: none    CN VIII   Hearing: intact    CN IX, X   Palate: symmetric  Right gag reflex: normal  Left gag reflex: normal    CN XI   Right sternocleidomastoid strength: normal  Left sternocleidomastoid strength: normal    CN XII   Tongue: not atrophic  Fasciculations: absent  Tongue deviation: none    Motor Exam   Muscle bulk: normal  Overall muscle tone: normal  Right arm tone: normal  Left arm tone: normal  Right leg tone: normal  Left leg tone: normal    Sensory Exam   Light touch normal.     Gait, Coordination, and Reflexes     Tremor   Resting tremor: absent  Intention tremor: absent  Action tremor: absent    Reflexes   Reflexes 2+ except as noted.       Physical Exam   Constitutional: She is oriented to person, place, and time.   Neurological: She is oriented to person, place, and time.   Psychiatric: Her speech is normal.       Assessment/Plan:       Problems Addressed this Visit        Digestive    Avitaminosis D     [place] on 7[address]58 IU Vit D             Nervous and Auditory    Essential tremor     " Stable off meds          Multiple sclerosis (CMS/HCC) - Primary     Stable on MRI Brain    Continue on Aubagio             Genitourinary    Urinary urgency     Continue Toviaz

## 2019-05-21 RX ORDER — LISINOPRIL 10 MG/1
TABLET ORAL
Qty: 30 TABLET | Refills: 3 | Status: CANCELLED | OUTPATIENT
Start: 2019-05-21

## 2019-05-22 RX ORDER — LEVOTHYROXINE SODIUM 75 MCG
TABLET ORAL
Qty: 30 TABLET | Refills: 3 | Status: SHIPPED | OUTPATIENT
Start: 2019-05-22 | End: 2019-05-31 | Stop reason: SDUPTHER

## 2019-05-23 RX ORDER — LEVOTHYROXINE SODIUM 75 MCG
TABLET ORAL
OUTPATIENT
Start: 2019-05-23

## 2019-05-23 RX ORDER — LISINOPRIL 10 MG/1
10 TABLET ORAL DAILY
Qty: 30 TABLET | Refills: 4 | Status: SHIPPED | OUTPATIENT
Start: 2019-05-23 | End: 2019-05-31 | Stop reason: SDUPTHER

## 2019-05-31 ENCOUNTER — OFFICE VISIT (OUTPATIENT)
Dept: ENDOCRINOLOGY | Facility: CLINIC | Age: 64
End: 2019-05-31

## 2019-05-31 VITALS
DIASTOLIC BLOOD PRESSURE: 70 MMHG | WEIGHT: 141.8 LBS | OXYGEN SATURATION: 99 % | SYSTOLIC BLOOD PRESSURE: 118 MMHG | HEART RATE: 72 BPM | HEIGHT: 62 IN | BODY MASS INDEX: 26.09 KG/M2

## 2019-05-31 DIAGNOSIS — E78.2 MIXED HYPERLIPIDEMIA: ICD-10-CM

## 2019-05-31 DIAGNOSIS — E03.9 ACQUIRED HYPOTHYROIDISM: ICD-10-CM

## 2019-05-31 DIAGNOSIS — E55.9 AVITAMINOSIS D: ICD-10-CM

## 2019-05-31 DIAGNOSIS — G35 MULTIPLE SCLEROSIS (HCC): ICD-10-CM

## 2019-05-31 DIAGNOSIS — I10 BENIGN ESSENTIAL HYPERTENSION: Primary | ICD-10-CM

## 2019-05-31 LAB
25(OH)D3 SERPL-MCNC: 10.3 NG/ML (ref 30–100)
ALBUMIN SERPL-MCNC: 4.2 G/DL (ref 3.5–5.2)
ALBUMIN/GLOB SERPL: 1.8 G/DL
ALP SERPL-CCNC: 67 U/L (ref 39–117)
ALT SERPL W P-5'-P-CCNC: 23 U/L (ref 1–33)
ANION GAP SERPL CALCULATED.3IONS-SCNC: 9.4 MMOL/L
AST SERPL-CCNC: 21 U/L (ref 1–32)
BASOPHILS # BLD AUTO: 0.05 10*3/MM3 (ref 0–0.2)
BASOPHILS NFR BLD AUTO: 1.1 % (ref 0–1.5)
BILIRUB SERPL-MCNC: 0.3 MG/DL (ref 0.2–1.2)
BUN BLD-MCNC: 8 MG/DL (ref 8–23)
BUN/CREAT SERPL: 11.6 (ref 7–25)
CALCIUM SPEC-SCNC: 8.9 MG/DL (ref 8.6–10.5)
CHLORIDE SERPL-SCNC: 105 MMOL/L (ref 98–107)
CHOLEST SERPL-MCNC: 191 MG/DL (ref 0–200)
CO2 SERPL-SCNC: 26.6 MMOL/L (ref 22–29)
CREAT BLD-MCNC: 0.69 MG/DL (ref 0.57–1)
DEPRECATED RDW RBC AUTO: 43.8 FL (ref 37–54)
EOSINOPHIL # BLD AUTO: 0.12 10*3/MM3 (ref 0–0.4)
EOSINOPHIL NFR BLD AUTO: 2.7 % (ref 0.3–6.2)
ERYTHROCYTE [DISTWIDTH] IN BLOOD BY AUTOMATED COUNT: 12.9 % (ref 12.3–15.4)
GFR SERPL CREATININE-BSD FRML MDRD: 86 ML/MIN/1.73
GLOBULIN UR ELPH-MCNC: 2.4 GM/DL
GLUCOSE BLD-MCNC: 88 MG/DL (ref 65–99)
HCT VFR BLD AUTO: 41.3 % (ref 34–46.6)
HDLC SERPL-MCNC: 48 MG/DL (ref 40–60)
HGB BLD-MCNC: 13 G/DL (ref 12–15.9)
IMM GRANULOCYTES # BLD AUTO: 0.01 10*3/MM3 (ref 0–0.05)
IMM GRANULOCYTES NFR BLD AUTO: 0.2 % (ref 0–0.5)
LDLC SERPL CALC-MCNC: 122 MG/DL (ref 0–100)
LDLC/HDLC SERPL: 2.55 {RATIO}
LYMPHOCYTES # BLD AUTO: 1.06 10*3/MM3 (ref 0.7–3.1)
LYMPHOCYTES NFR BLD AUTO: 24 % (ref 19.6–45.3)
MCH RBC QN AUTO: 29.1 PG (ref 26.6–33)
MCHC RBC AUTO-ENTMCNC: 31.5 G/DL (ref 31.5–35.7)
MCV RBC AUTO: 92.6 FL (ref 79–97)
MONOCYTES # BLD AUTO: 0.66 10*3/MM3 (ref 0.1–0.9)
MONOCYTES NFR BLD AUTO: 14.9 % (ref 5–12)
NEUTROPHILS # BLD AUTO: 2.52 10*3/MM3 (ref 1.7–7)
NEUTROPHILS NFR BLD AUTO: 57.1 % (ref 42.7–76)
NRBC BLD AUTO-RTO: 0 /100 WBC (ref 0–0.2)
PLATELET # BLD AUTO: 173 10*3/MM3 (ref 140–450)
PMV BLD AUTO: 12.5 FL (ref 6–12)
POTASSIUM BLD-SCNC: 4.3 MMOL/L (ref 3.5–5.2)
PROT SERPL-MCNC: 6.6 G/DL (ref 6–8.5)
RBC # BLD AUTO: 4.46 10*6/MM3 (ref 3.77–5.28)
SODIUM BLD-SCNC: 141 MMOL/L (ref 136–145)
T4 FREE SERPL-MCNC: 1.19 NG/DL (ref 0.93–1.7)
TRIGL SERPL-MCNC: 104 MG/DL (ref 0–150)
TSH SERPL DL<=0.05 MIU/L-ACNC: 4.43 MIU/ML (ref 0.27–4.2)
VIT B12 BLD-MCNC: 368 PG/ML (ref 211–946)
VLDLC SERPL-MCNC: 20.8 MG/DL (ref 5–40)
WBC NRBC COR # BLD: 4.42 10*3/MM3 (ref 3.4–10.8)

## 2019-05-31 PROCEDURE — 80053 COMPREHEN METABOLIC PANEL: CPT | Performed by: INTERNAL MEDICINE

## 2019-05-31 PROCEDURE — 84443 ASSAY THYROID STIM HORMONE: CPT | Performed by: INTERNAL MEDICINE

## 2019-05-31 PROCEDURE — 90632 HEPA VACCINE ADULT IM: CPT | Performed by: INTERNAL MEDICINE

## 2019-05-31 PROCEDURE — 82306 VITAMIN D 25 HYDROXY: CPT | Performed by: INTERNAL MEDICINE

## 2019-05-31 PROCEDURE — 80061 LIPID PANEL: CPT | Performed by: INTERNAL MEDICINE

## 2019-05-31 PROCEDURE — 85025 COMPLETE CBC W/AUTO DIFF WBC: CPT | Performed by: INTERNAL MEDICINE

## 2019-05-31 PROCEDURE — 84439 ASSAY OF FREE THYROXINE: CPT | Performed by: INTERNAL MEDICINE

## 2019-05-31 PROCEDURE — 82607 VITAMIN B-12: CPT | Performed by: INTERNAL MEDICINE

## 2019-05-31 PROCEDURE — 90471 IMMUNIZATION ADMIN: CPT | Performed by: INTERNAL MEDICINE

## 2019-05-31 PROCEDURE — 99214 OFFICE O/P EST MOD 30 MIN: CPT | Performed by: INTERNAL MEDICINE

## 2019-05-31 RX ORDER — LEVOTHYROXINE SODIUM 0.07 MG/1
75 TABLET ORAL DAILY
Qty: 30 TABLET | Refills: 5 | Status: SHIPPED | OUTPATIENT
Start: 2019-05-31 | End: 2019-06-05 | Stop reason: SDUPTHER

## 2019-05-31 RX ORDER — FLUTICASONE PROPIONATE 50 MCG
SPRAY, SUSPENSION (ML) NASAL
COMMUNITY
Start: 2019-05-06 | End: 2019-06-03

## 2019-05-31 RX ORDER — LISINOPRIL 10 MG/1
10 TABLET ORAL DAILY
Qty: 30 TABLET | Refills: 5 | Status: SHIPPED | OUTPATIENT
Start: 2019-05-31 | End: 2019-12-23 | Stop reason: SDUPTHER

## 2019-05-31 RX ORDER — BENZONATATE 100 MG/1
CAPSULE ORAL
COMMUNITY
Start: 2019-05-06 | End: 2019-06-03

## 2019-05-31 RX ORDER — LEVOCETIRIZINE DIHYDROCHLORIDE 5 MG/1
TABLET, FILM COATED ORAL
COMMUNITY
Start: 2019-05-06 | End: 2019-06-03

## 2019-05-31 RX ORDER — SIMVASTATIN 20 MG
20 TABLET ORAL DAILY
Qty: 30 TABLET | Refills: 5 | Status: SHIPPED | OUTPATIENT
Start: 2019-05-31 | End: 2019-12-23

## 2019-05-31 NOTE — PROGRESS NOTES
Nicky H Graciela 63 y.o.  [facility]:Follow-up; Hypertension; Hypothyroidism (was out of synthroid 3-4 days last week until refill was sent in); Hyperlipidemia; and Multiple Sclerosis      [place]:Follow-up; Hypertension; Hypothyroidism (was out of synthroid 3-4 days last week until refill was sent in); Hyperlipidemia; and Multiple Sclerosis    bp is good   Was out of synthroid for 3-4 days - now back on 75 mcg daily  Is on ace and statin   aubagio still working well but having a lot more emotional lability   Brother  new years yao and sister  may 1   Mother AD- recent hip fracture  A lot more stress due to above   We discussed impact of stress on overall health   She has appt next week with Dr Donovan     Allergies   Allergen Reactions   • Penicillins        Current Outpatient Medications:   •  estradiol (MINIVELLE, VIVELLE-DOT) 0.05 MG/24HR patch, Place 1 patch on the skin 1 (One) Time Per Week., Disp: , Rfl:   •  levothyroxine (SYNTHROID) 75 MCG tablet, Take 1 tablet by mouth Daily., Disp: 30 tablet, Rfl: 5  •  lisinopril (PRINIVIL,ZESTRIL) 10 MG tablet, Take 1 tablet by mouth Daily., Disp: 30 tablet, Rfl: 5  •  simvastatin (ZOCOR) 20 MG tablet, Take 1 tablet by mouth Daily., Disp: 30 tablet, Rfl: 5  •  Teriflunomide (AUBAGIO) 14 MG tablet, Take 14 mg by mouth Daily., Disp: 30 tablet, Rfl: 11  •  TOVIAZ 4 MG tablet sustained-release 24 hour tablet, , Disp: , Rfl:   Patient Active Problem List    Diagnosis   • Urinary urgency [R39.15]   • Bruising [T14.8XXA]   • Acute gastritis [K29.00]   • Abdominal pain RUQ [R10.9]   • Visit for screening mammogram [Z12.31]   • Anemia [D64.9]   • Anorexia [R63.0]   • Anxiety [F41.9]   • Benign essential hypertension [I10]   • Bone cyst [M85.60]   • H/O colonoscopy [Z98.890]   • Degeneration of intervertebral disc of lumbar region [M51.36]   • Dysphagia [R13.10]   • Erosive esophagitis [K22.10]   • Essential tremor [G25.0]   • Hyperlipidemia [E78.5]   • Hyperreflexia [R29.2]   •  Hypothyroidism [E03.9]   • Multiple sclerosis (CMS/HCC) [G35]   • Heart murmur [R01.1]   • Muscle weakness [M62.81]   • Nausea [R11.0]   • Nonallopathic lesion of cervical region [M99.9]   • Osteoporosis [M81.0]   • Shoulder pain [M25.519]   • Weakness [R53.1]   • Avitaminosis D [E55.9]   • Cobalamin deficiency [E53.8]   • Tremor [R25.1]     Review of Systems   Constitutional: Positive for activity change. Negative for appetite change, chills, diaphoresis, fatigue, fever and unexpected weight change.   HENT: Negative for congestion, dental problem, drooling, ear discharge, ear pain, facial swelling, hearing loss, mouth sores, nosebleeds, postnasal drip, rhinorrhea, sinus pressure, sneezing, sore throat, tinnitus, trouble swallowing and voice change.    Eyes: Negative for photophobia, pain, discharge, redness, itching and visual disturbance.   Respiratory: Negative for apnea, cough, choking, chest tightness, shortness of breath, wheezing and stridor.    Cardiovascular: Negative for chest pain, palpitations and leg swelling.   Gastrointestinal: Negative for abdominal distention, abdominal pain, anal bleeding, blood in stool, constipation, diarrhea, nausea, rectal pain and vomiting.   Endocrine: Negative for cold intolerance, heat intolerance, polydipsia, polyphagia and polyuria.   Genitourinary: Negative for decreased urine volume, difficulty urinating, dysuria, enuresis, flank pain, frequency, genital sores, hematuria and urgency.   Musculoskeletal: Positive for arthralgias. Negative for back pain, gait problem, joint swelling, myalgias, neck pain and neck stiffness.   Skin: Negative for color change, pallor, rash and wound.   Allergic/Immunologic: Negative for environmental allergies, food allergies and immunocompromised state.   Neurological: Negative for dizziness, tremors, seizures, syncope, facial asymmetry, speech difficulty, weakness, light-headedness, numbness and headaches.   Hematological: Negative for  "adenopathy. Does not bruise/bleed easily.   Psychiatric/Behavioral: Positive for dysphoric mood. Negative for agitation, behavioral problems, confusion, decreased concentration, hallucinations, self-injury, sleep disturbance and suicidal ideas. The patient is nervous/anxious. The patient is not hyperactive.      Social History     Socioeconomic History   • Marital status:      Spouse name: Not on file   • Number of children: Not on file   • Years of education: Not on file   • Highest education level: Not on file   Tobacco Use   • Smoking status: Never Smoker   • Smokeless tobacco: Never Used   Substance and Sexual Activity   • Alcohol use: No   • Drug use: No   • Sexual activity: Defer     Family History   Problem Relation Age of Onset   • Heart disease Mother    • Hypertension Mother    • Heart disease Father    • Hypertension Father    • Hypertension Sister    • Other Sister         malignant neoplasm   • Mental illness Sister    • Stroke Brother    • Hypertension Brother    • Mental illness Brother    • Alzheimer's disease Other         Aunt     /70   Pulse 72   Ht 157.5 cm (62\")   Wt 64.3 kg (141 lb 12.8 oz)   SpO2 99%   BMI 25.94 kg/m²   Physical Exam   Constitutional: She is oriented to person, place, and time. She appears well-developed and well-nourished.   HENT:   Head: Normocephalic and atraumatic.   Nose: Nose normal.   Mouth/Throat: Oropharynx is clear and moist.   Eyes: Conjunctivae, EOM and lids are normal. Pupils are equal, round, and reactive to light.   Neck: Trachea normal and normal range of motion. Neck supple. Carotid bruit is not present. No tracheal deviation present. No thyroid mass and no thyromegaly present.   Cardiovascular: Normal rate, regular rhythm, normal heart sounds and intact distal pulses. Exam reveals no gallop and no friction rub.   No murmur heard.  Pulmonary/Chest: Effort normal and breath sounds normal. No respiratory distress. She has no wheezes. "   Musculoskeletal: Normal range of motion. She exhibits no edema or deformity.   Lymphadenopathy:     She has no cervical adenopathy.   Neurological: She is alert and oriented to person, place, and time. She has normal reflexes. She displays normal reflexes. No cranial nerve deficit.   Skin: Skin is warm and dry. No rash noted. No cyanosis or erythema. Nails show no clubbing.   Psychiatric: She has a normal mood and affect. Her speech is normal and behavior is normal. Judgment and thought content normal. Cognition and memory are normal.   Nursing note and vitals reviewed.    Results for orders placed or performed during the hospital encounter of 03/28/19   POC Creatinine   Result Value Ref Range    Creatinine 0.70 0.60 - 1.30 mg/dL     Problem List Items Addressed This Visit        Cardiovascular and Mediastinum    Hyperlipidemia     Check flp          Relevant Medications    simvastatin (ZOCOR) 20 MG tablet    Other Relevant Orders    Lipid Panel    T4, Free    TSH    Benign essential hypertension - Primary     bp is good today- continue to monitor          Relevant Medications    lisinopril (PRINIVIL,ZESTRIL) 10 MG tablet    Other Relevant Orders    Comprehensive Metabolic Panel    CBC Auto Differential       Digestive    Avitaminosis D     Update vitamin D levels          Relevant Orders    Vitamin D 25 Hydroxy       Endocrine    Hypothyroidism     Check tfts          Relevant Medications    levothyroxine (SYNTHROID) 75 MCG tablet    Other Relevant Orders    Vitamin B12       Nervous and Auditory    Multiple sclerosis (CMS/HCC)     Has appt Dr Donovan next week              Return in about 6 months (around 11/30/2019) for Recheck 30 min .    Jennifer Rubi MA  Signed Tierney Mata MD

## 2019-06-03 ENCOUNTER — LAB (OUTPATIENT)
Dept: LAB | Facility: HOSPITAL | Age: 64
End: 2019-06-03

## 2019-06-03 ENCOUNTER — HOSPITAL ENCOUNTER (OUTPATIENT)
Dept: SPEECH THERAPY | Facility: HOSPITAL | Age: 64
Setting detail: THERAPIES SERIES
Discharge: HOME | End: 2019-06-03

## 2019-06-03 ENCOUNTER — OFFICE VISIT (OUTPATIENT)
Dept: NEUROLOGY | Facility: CLINIC | Age: 64
End: 2019-06-03

## 2019-06-03 VITALS
OXYGEN SATURATION: 99 % | DIASTOLIC BLOOD PRESSURE: 86 MMHG | BODY MASS INDEX: 25.95 KG/M2 | SYSTOLIC BLOOD PRESSURE: 128 MMHG | HEIGHT: 62 IN | HEART RATE: 68 BPM | RESPIRATION RATE: 16 BRPM | WEIGHT: 141 LBS

## 2019-06-03 DIAGNOSIS — R39.15 URINARY URGENCY: ICD-10-CM

## 2019-06-03 DIAGNOSIS — G35 MULTIPLE SCLEROSIS (HCC): Primary | ICD-10-CM

## 2019-06-03 DIAGNOSIS — F32.9 MAJOR DEPRESSIVE DISORDER WITH CURRENT ACTIVE EPISODE, UNSPECIFIED DEPRESSION EPISODE SEVERITY, UNSPECIFIED WHETHER RECURRENT: Primary | ICD-10-CM

## 2019-06-03 DIAGNOSIS — G35 MULTIPLE SCLEROSIS (HCC): ICD-10-CM

## 2019-06-03 DIAGNOSIS — R25.1 TREMOR: ICD-10-CM

## 2019-06-03 PROCEDURE — 99214 OFFICE O/P EST MOD 30 MIN: CPT | Performed by: PSYCHIATRY & NEUROLOGY

## 2019-06-03 PROCEDURE — 86255 FLUORESCENT ANTIBODY SCREEN: CPT

## 2019-06-03 PROCEDURE — 36415 COLL VENOUS BLD VENIPUNCTURE: CPT

## 2019-06-03 PROCEDURE — 83516 IMMUNOASSAY NONANTIBODY: CPT

## 2019-06-03 PROCEDURE — 92523 SPEECH SOUND LANG COMPREHEN: CPT

## 2019-06-03 PROCEDURE — 82784 ASSAY IGA/IGD/IGG/IGM EACH: CPT

## 2019-06-03 RX ORDER — ERGOCALCIFEROL 1.25 MG/1
50000 CAPSULE ORAL
Qty: 4 CAPSULE | Refills: 7 | Status: SHIPPED | OUTPATIENT
Start: 2019-06-03 | End: 2020-02-24

## 2019-06-03 RX ORDER — ESCITALOPRAM OXALATE 10 MG/1
10 TABLET ORAL DAILY
Qty: 30 TABLET | Refills: 5 | Status: SHIPPED | OUTPATIENT
Start: 2019-06-03 | End: 2019-12-23

## 2019-06-03 NOTE — PROGRESS NOTES
Subjective:   Chief Complaint   Patient presents with   • Multiple Sclerosis       Patient ID: Nicky Owens is a 63 y.o. female.    History of Present Illness     63 y.o. woman with RRMS and essential tremors returns in follow up.  Last visit on 4/12/19 continued Aubagio, Toviaz and started Vit D.     MRI Brain,  3/28/19 as compared to 12/26/17,  no new, enlarging or enhancing lesions       5/31/19: Vit D  10.3; TSH 4.43; ALT 23, AST 21    MDD    Mood is worsened with recent deaths in family.  Insides feel like they are shaking.  Increased tremors on in right hand.      RRMS     Fatigue is mild.      Increased tremors.      Tolerating Aubagio without side effects.      Essential Tremor    Tremor minimal    Urinary urgency     Toviaz controlling frequency and urgrency but slight worsening with stress.        The following portions of the patient's history were reviewed and updated as appropriate: allergies, current medications, past medical history, past surgical history and problem list.    Review of Systems   Constitutional: Negative for activity change and unexpected weight change.   HENT: Negative for tinnitus and trouble swallowing.    Eyes: Negative for photophobia and visual disturbance.   Respiratory: Negative for apnea and choking.    Cardiovascular: Negative for leg swelling.   Endocrine: Positive for heat intolerance. Negative for cold intolerance.   Genitourinary: Positive for frequency and urgency. Negative for difficulty urinating and menstrual problem.   Musculoskeletal: Negative for back pain and gait problem.   Skin: Negative for color change.   Allergic/Immunologic: Negative for immunocompromised state.   Neurological: Negative for dizziness, tremors, seizures, syncope, facial asymmetry, speech difficulty and light-headedness.   Hematological: Negative for adenopathy. Does not bruise/bleed easily.   Psychiatric/Behavioral: Negative for behavioral problems, confusion, decreased concentration,  "hallucinations and sleep disturbance.        Objective:  Vitals:    06/03/19 1056   BP: 128/86   BP Location: Left arm   Patient Position: Sitting   Cuff Size: Adult   Pulse: 68   Resp: 16   SpO2: 99%   Weight: 64 kg (141 lb)   Height: 157.5 cm (62\")       Neurologic Exam     Mental Status   Oriented to person, place, and time.   Registration: recalls 3 of 3 objects. Recall at 5 minutes: recalls 3 of 3 objects.   Attention: normal. Concentration: normal.   Speech: speech is normal   Level of consciousness: alert  Knowledge: good and consistent with education.   Normal comprehension.     Cranial Nerves     CN II   Visual fields full to confrontation.   Visual acuity: normal  Right visual field deficit: none  Left visual field deficit: none     CN III, IV, VI   Nystagmus: none   Diplopia: none  Ophthalmoparesis: none  Upgaze: normal  Downgaze: normal  Conjugate gaze: present    CN V   Facial sensation intact.   Right corneal reflex: normal  Left corneal reflex: normal    CN VII   Right facial weakness: none  Left facial weakness: none    CN VIII   Hearing: intact    CN IX, X   Palate: symmetric  Right gag reflex: normal  Left gag reflex: normal    CN XI   Right sternocleidomastoid strength: normal  Left sternocleidomastoid strength: normal    CN XII   Tongue: not atrophic  Fasciculations: absent  Tongue deviation: none    Motor Exam   Muscle bulk: normal  Overall muscle tone: normal  Right arm tone: normal  Left arm tone: normal  Right leg tone: normal  Left leg tone: normal    Sensory Exam   Light touch normal.     Gait, Coordination, and Reflexes     Tremor   Resting tremor: absent  Intention tremor: absent  Action tremor: absent    Reflexes   Reflexes 2+ except as noted.       Physical Exam   Constitutional: She is oriented to person, place, and time.   Neurological: She is oriented to person, place, and time.   Psychiatric: Her speech is normal.       Assessment/Plan:       Problems Addressed this Visit        " Nervous and Auditory    Multiple sclerosis (CMS/HCC)     Sx slight worsening with recent emotional stressors.     Continue Aubagio          Relevant Orders    Celiac Disease Panel       Genitourinary    Urinary urgency     Toviaz controlling sx             Other    Tremor     Sx stable            Other Visit Diagnoses     Major depressive disorder with current active episode, unspecified depression episode severity, unspecified whether recurrent    -  Primary    Relevant Medications    escitalopram (LEXAPRO) 10 MG tablet

## 2019-06-03 NOTE — THERAPY EVALUATION
"Outpatient Speech Language Pathology   Adult Speech Language Cognitive Initial Evaluation  [facility]     Patient Name: Nicky Owens  : 1955  MRN: [id number]  Today's Date: 6/3/2019        Visit Date: 2019   Patient Active Problem List   Diagnosis   • Anemia   • Anorexia   • Anxiety   • Benign essential hypertension   • Bone cyst   • H/O colonoscopy   • Degeneration of intervertebral disc of lumbar region   • Dysphagia   • Erosive esophagitis   • Essential tremor   • Hyperlipidemia   • Hyperreflexia   • Hypothyroidism   • Multiple sclerosis (CMS/HCC)   • Heart murmur   • Muscle weakness   • Nausea   • Nonallopathic lesion of cervical region   • Osteoporosis   • Shoulder pain   • Weakness   • Avitaminosis D   • Cobalamin deficiency   • Tremor   • Acute gastritis   • Abdominal pain RUQ   • Visit for screening mammogram   • Bruising   • Urinary urgency   • Major depressive disorder with current active episode        Past Medical History:   Diagnosis Date   • Back pain     Last Impression: 2015  discussed nsaidpain not typical for shinglespossibly due to recent car trip- back strainuse heat and icegabapentin sent for use prncheck ua, cmp, etc  Tierney Mata (Endocrinology)   • Bone cyst     hip   • Dysphagia      \" egd 9/15 gastritis without gastropathy   • Essential tremor     Last Impression: 2015  only since surgery- some cogwheelingcheck medications in hospitalcontinue valium prnrefer neurology  Tierney Mata (Endocrinology)   • Generalized weakness    • Heart murmur    • High cholesterol    • History of colon polyps    • History of mammogram 2013    [facility]   • History of Papanicolaou smear of cervix    • Hyperreflexia    • Hypertension    • Multiple sclerosis (CMS/HCC)    • Muscle weakness     Last Impression: 18 May 2015  check cmp, cbc, tshsuspect tsh is still lowdifficult time recovering from surgery check ekgconsider cat scan head if symptoms headache " and weakness persist  Tierney Mata (Endocrinology)   • Neuropathy    • Nonallopathic lesion of cervical region    • RUQ abdominal pain    • Shoulder pain    • Weakness of both legs         Past Surgical History:   Procedure Laterality Date   • CHOLECYSTECTOMY     • COLONOSCOPY  07/31/2012   • HYSTERECTOMY     • TONSILLECTOMY           Visit Dx:    ICD-10-CM ICD-9-CM   1. Multiple sclerosis (CMS/[facility]) G35 340           SLP [facility] Evaluation - 06/03/19 1300        Communication Assessment/Intervention    Document Type  evaluation   -HG    Subjective Information  no complaints   -HG    Patient Observations  alert;cooperative;agree to therapy   -HG    Patient Effort  excellent   -HG    Symptoms Noted During/After Treatment  none   -HG       General Information    Patient Profile Reviewed  yes   -HG    Pertinent History Of Current Problem  Pt is a 63 year old female with a Dx of RRMS since 2015. Pt with noticeable word finding difficulties.    -HG    Precautions/Limitations, Vision  WFL with corrective lenses   -HG    Precautions/Limitations, Hearing  WFL;for purposes of eval   -HG    Patient Level of Education  Master's +30 hours   -HG    Prior Level of Function-Communication  WFL   -HG    Plans/Goals Discussed with  patient   -HG    Barriers to Rehab  none identified   -HG    Patient's Goals for Discharge  functional cognition   -HG    Standardized Assessment Used  RBANS   -       Cognitive Assessment Intervention- SLP    Cognitive Function (Cognition)  mild impairment   -HG    Orientation Status (Cognition)  WFL   -HG    Memory (Cognitive)  immediate;short-term;mild impairment   -HG    Attention (Cognitive)  WFL   -HG    Thought Organization (Cognitive)  mild impairment   -HG       RBANS- Repeatable Battery for the Assessment of Neuropsychological Status    Immediate Memory Index Score  87   -HG    Immediate Memory Percentile  19 %   -HG    Immediate Memory Qualitative Description  low average   -HG     Visuospatial Index Score  100   -HG    Visuospatial Percentile  50 %   -HG    Visuospatial Qualitative Description  average   -HG    Language Index Score  96   -HG    Language Percentile  39 %   -HG    Language Qualitative Description  average   -HG    Attention Index Score  118   -HG    Attention Percentile  88 %   -HG    Attention Qualitative Description  high average   -HG    Delayed Memory Index Score  88   -HG    Delayed Memory Percentile  21 %   -HG    Delayed Memory Qualitative Description  low average   -HG    Total Index Score  489   -HG    Total Percentile  39 %   -HG    Total Qualitative Description  average   -HG      User Key  (r) = Recorded By, (t) = Taken By, (c) = Cosigned By    Initials Name Provider Type    Kaylee Vallejo MS CCC-SLP Speech and Language Pathologist                         OP SLP Education     Row Name 06/03/19 1901       Education    Barriers to Learning  No barriers identified  -    Education Provided  Patient demonstrated recommended strategies;Patient requires further education on strategies, risks;Patient participated in establishing goals and treatment plan;Patient expressed understanding of evaluation  -    Assessed  Learning needs;Learning motivation;Learning preferences;Learning readiness  -    Learning Motivation  Strong  -    Learning Method  Explanation;Demonstration;Teach back;Written materials  -    Teaching Response  Verbalized understanding;Demonstrated understanding;Reinforcement needed  -    Education Comments  Education for chaining strategy for recall as well as communication strategies to help with word finding.  -      User Key  (r) = Recorded By, (t) = Taken By, (c) = Cosigned By    Initials Name Effective Dates    Kaylee Vallejo MS CCC-SLP 06/22/15 -           SLP OP Goals     Row Name 06/03/19 1300          Goal Type Needed    Goal Type Needed  Executive Function;Memory;Other Adult Goals  -        Subjective Comments     Subjective Comments  Pt alert, cooperative, aware of word finding deficits.  -HG        Executive Function Goals    Patient will be able to use high level cognitive skills to allow patient to return to work  100%:;with cues  -HG     Status: Patient will be able to use high level cognitive skills to allow patient to return to work  New  -HG     Patient will improve executive functioning skills by maintaining conversation or task  90%:;with cues  -HG     Status: Patient will improve executive functioning skills by maintaining conversation or task  New  -HG     Patient will improve executive functioning skills by using self-monitoring strategies during functional tasks  90%:;with cues  -HG     Status: Patient will improve executive functioning skills by using self-monitoring strategies during functional tasks  New  -HG        Memory Goals    Status: Patient will be able to remember  information needed to participate in activities of daily living  New  -HG     Patient will demonstrate improved ability to recall information by immediately recalling a series of words  90%:;unrelated;after delay  -HG     Status: Patient will demonstrate improved ability to recall information by immediately recalling a series of words  New  -HG     Patient’s memory skills will be enhanced as reported by patient by utilizing internal memory strategies to recall up to 3 pieces of information after a 5- minute delay  90%:;with cues  -HG     Status: Patient’s memory skills will be enhanced as reported by patient by utilizing internal memory strategies to recall up to 3 pieces of information after a 5- minute delay  New  -HG        Other Goals    Other Adult Goal- 1  Pt will complete high level thought organization tasks with 90% accuracy.  -HG     Status: Other Adult Goal- 1  New  -HG     Other Adult Goal- 2  Pt will improve RBANS Total Score to at least a 110 placing pt in the High Average range,  -HG     Status: Other Adult Goal- 2  New  -HG         SLP Time Calculation    SLP Goal Re-Cert Due Date  07/03/19  -HG       User Key  (r) = Recorded By, (t) = Taken By, (c) = Cosigned By    Initials Name Provider Type    MAGDALENA Kaylee Roldan MS CCC-SLP Speech and Language Pathologist          OP SLP Assessment/Plan - 06/03/19 1300        SLP Assessment    Functional Problems  Speech Language- Adult/Cognition   -HG    Impact on Function: Adult Speech Language/Cognition  Trouble learning or remembering new information   -HG    Clinical Impression: Speech Language-Adult/Congnition  Mild:;Cognitive Communication Impairment   -HG    Functional Problems Comment  Pt reports difficulty with word finding at the conversation level as recall of recent newsradio info.   -HG    Clinical Impression Comments  RBANS Total Score of 96 places pt in the Average range.   -HG    Please refer to paper survey for additional self-reported information  Yes   -HG    Please refer to items scanned into chart for additional diagnostic informaiton and handouts as provided by clinician  Yes   -HG    SLP Diagnosis  Mild Cognitive Impairment   -HG    Prognosis  Excellent (comment)   -HG    Patient/caregiver participated in establishment of treatment plan and goals  Yes   -HG    Patient would benefit from skilled therapy intervention  Yes   -HG       SLP Plan    Frequency  1-2x/week   -HG    Duration  12 weeks   -HG    Planned CPT's?  SLP SPEECH & LANGUAGE EVAL: 73209;SLP INDIVIDUAL SPEECH THERAPY: 02106   -HG    Expected Duration Therapy Session - minutes  45-60 minutes   -HG    Plan Comments  Initiate Cog tx.    -HG      User Key  (r) = Recorded By, (t) = Taken By, (c) = Cosigned By    Initials Name Provider Type    MAGDALENA YuliKaylee MS CCC-SLP Speech and Language Pathologist                 Time Calculation:   SLP Start Time: 1300    Therapy Charges for Today     Code Description Service Date Service Provider Modifiers Qty    [id number] [facility] EVAL SPEECH AND PROD W LANG  6 6/3/2019  Yuli, Kaylee YBARRA, MS CCC-SLP GN 1                   Kaylee Roldan, MS CCC-SLP  6/3/2019

## 2019-06-05 ENCOUNTER — TELEPHONE (OUTPATIENT)
Dept: ENDOCRINOLOGY | Facility: CLINIC | Age: 64
End: 2019-06-05

## 2019-06-05 LAB
ENDOMYSIUM IGA SER QL: NEGATIVE
IGA SERPL-MCNC: 143 MG/DL (ref 87–352)
TTG IGA SER-ACNC: <2 U/ML (ref 0–3)

## 2019-06-05 RX ORDER — LEVOTHYROXINE SODIUM 88 UG/1
88 TABLET ORAL DAILY
Qty: 90 TABLET | Refills: 1 | Status: SHIPPED | OUTPATIENT
Start: 2019-06-05 | End: 2019-12-23

## 2019-06-17 ENCOUNTER — HOSPITAL ENCOUNTER (OUTPATIENT)
Dept: SPEECH THERAPY | Facility: HOSPITAL | Age: 64
Setting detail: THERAPIES SERIES
Discharge: HOME | End: 2019-06-17

## 2019-06-17 DIAGNOSIS — G35 MULTIPLE SCLEROSIS (HCC): Primary | ICD-10-CM

## 2019-06-17 PROCEDURE — 92507 TX SP LANG VOICE COMM INDIV: CPT

## 2019-06-17 NOTE — THERAPY TREATMENT NOTE
Outpatient Speech Language Pathology   Adult Speech Language Cognitive Treatment Note   [place]     Patient Name: Nicky Owens  : 1955  MRN: [id number]  Today's Date: 2019         Visit Date: 2019   Patient Active Problem List   Diagnosis   • Anemia   • Anorexia   • Anxiety   • Benign essential hypertension   • Bone cyst   • H/O colonoscopy   • Degeneration of intervertebral disc of lumbar region   • Dysphagia   • Erosive esophagitis   • Essential tremor   • Hyperlipidemia   • Hyperreflexia   • Hypothyroidism   • Multiple sclerosis (CMS/HCC)   • Heart murmur   • Muscle weakness   • Nausea   • Nonallopathic lesion of cervical region   • Osteoporosis   • Shoulder pain   • Weakness   • Avitaminosis D   • Cobalamin deficiency   • Tremor   • Acute gastritis   • Abdominal pain RUQ   • Visit for screening mammogram   • Bruising   • Urinary urgency   • Major depressive disorder with current active episode          Visit Dx:    ICD-10-CM ICD-9-CM   1. Multiple sclerosis (CMS/HCC) G35 340                         SLP OP Goals     Row Name 19 1300          Goal Type Needed    Goal Type Needed  Executive Function;Apraxia;Other Adult Goals  -HG        Subjective Comments    Subjective Comments  Pt alert, cooperative, reviewed RBANS results with pt and she felt good about results overall.   -HG        Executive Function Goals    Patient will be able to use high level cognitive skills to allow patient to return to work  100%:;with cues  -HG     Status: Patient will be able to use high level cognitive skills to allow patient to return to work  New  -HG     Patient will improve executive functioning skills by maintaining conversation or task  90%:;with cues  -HG     Status: Patient will improve executive functioning skills by maintaining conversation or task  New  -HG     Patient will improve executive functioning skills by using self-monitoring strategies during functional tasks  90%:;with cues  -HG      Status: Patient will improve executive functioning skills by using self-monitoring strategies during functional tasks  New  -HG        Memory Goals    Status: Patient will be able to remember  information needed to participate in activities of daily living  New  -HG     Patient will demonstrate improved ability to recall information by immediately recalling a series of words  90%:;unrelated;after delay  -HG     Status: Patient will demonstrate improved ability to recall information by immediately recalling a series of words  Progressing as expected  -HG     Comments: Patient will demonstrate improved ability to recall information by immediately recalling a series of words  6/17/19: Immediate recall of word placement by exclusion and placement and pt was % accurate with 4 and 5 words.   -HG     Patient’s memory skills will be enhanced as reported by patient by utilizing internal memory strategies to recall up to 3 pieces of information after a 5- minute delay  90%:;with cues  -HG     Status: Patient’s memory skills will be enhanced as reported by patient by utilizing internal memory strategies to recall up to 3 pieces of information after a 5- minute delay  Progressing as expected  -HG     Comments: Patient’s memory skills will be enhanced as reported by patient by utilizing internal memory strategies to recall up to 3 pieces of information after a 5- minute delay  6/17/19: Delayed recall of 6 un-related words and pt was 6/6 x 2, 7 un-related words: pt was 7/7 x 2, 8 un-related words: pt was 6/8.   -HG        Other Goals    Other Adult Goal- 1  Pt will complete high level thought organization tasks with 90% accuracy.  -HG     Status: Other Adult Goal- 1  Progressing as expected  -HG     Comments: Other Adult Goal- 1  6/17/19: abstract divergent naming: pt was 10/10, 8/10. Scattergories and pt was 80% accurate.   -HG     Other Adult Goal- 2  Pt will improve RBANS Total Score to at least a 110 placing pt in  the High Average range,  -HG     Status: Other Adult Goal- 2  New  -HG        SLP Time Calculation    SLP Goal Re-Cert Due Date  07/03/19  -HG       User Key  (r) = Recorded By, (t) = Taken By, (c) = Cosigned By    Initials Name Provider Type    Kaylee Vallejo MS CCC-SLP Speech and Language Pathologist          OP SLP Education     Row Name 06/17/19 1300       Education    Education Comments  Hmwk for 8 un-related words.   -HG      User Key  (r) = Recorded By, (t) = Taken By, (c) = Cosigned By    Initials Name Effective Dates    Kaylee Vallejo MS CCC-SLP 06/22/15 -           OP SLP Assessment/Plan - 06/17/19 1300        SLP Plan    Plan Comments  Cont with Cog tx.    -HG      User Key  (r) = Recorded By, (t) = Taken By, (c) = Cosigned By    Initials Name Provider Type    Kaylee Vallejo MS CCC-SLP Speech and Language Pathologist                 Time Calculation:   SLP Start Time: 1300    Therapy Charges for Today     Code Description Service Date Service Provider Modifiers Qty    58687833071 HC ST TREATMENT SPEECH 4 6/17/2019 Kaylee Roldan MS CCC-SLP GN 1                   Kaylee Roldan MS CCC-SLP  6/17/2019

## 2019-07-01 ENCOUNTER — HOSPITAL ENCOUNTER (OUTPATIENT)
Dept: SPEECH THERAPY | Facility: HOSPITAL | Age: 64
Setting detail: THERAPIES SERIES
Discharge: HOME | End: 2019-07-01

## 2019-07-01 DIAGNOSIS — G35 MULTIPLE SCLEROSIS (HCC): Primary | ICD-10-CM

## 2019-07-01 PROCEDURE — 92507 TX SP LANG VOICE COMM INDIV: CPT

## 2019-07-01 NOTE — PROGRESS NOTES
Subjective:   Chief Complaint   Patient presents with   • Multiple Sclerosis       Patient ID: Nicky Owens is a 63 y.o. female.    History of Present Illness     63 y.o.  yo woman with RRMS and essential tremors returns in follow up.  Last visit on 8/31/18 continued Aubagio, ordered labs.      MRI Brain 12/26/17 C1 lesion resolved, no new, enlarging or enhancing lesions in comparison to 11/11/16 11/29/18:  ALT 24, AST 23, TSH 1.457     RRMS    MSFC T25FW 6.87 sec, SLS > 20 sec B, 9 hole peg R 14 sec, L 16 sec  SDMT 49    Pt would like SLP eval for memory.  Notices trouble focusing, decreased attention and concentration.      Fatigue is minimal.      No new or worsening sx     Tolerating Aubagio without side effects.      Essential Tremor    Tremor minimal    Urinary urgency     Started on Toviaz by Dr Null and sx markedly improved.        The following portions of the patient's history were reviewed and updated as appropriate: allergies, current medications, past medical history, past surgical history and problem list.    Review of Systems   Constitutional: Negative for activity change and unexpected weight change.   HENT: Negative for tinnitus and trouble swallowing.    Eyes: Negative for photophobia and visual disturbance.   Respiratory: Negative for apnea and choking.    Cardiovascular: Negative for leg swelling.   Endocrine: Positive for heat intolerance. Negative for cold intolerance.   Genitourinary: Positive for frequency and urgency. Negative for difficulty urinating and menstrual problem.   Musculoskeletal: Negative for back pain and gait problem.   Skin: Negative for color change.   Allergic/Immunologic: Negative for immunocompromised state.   Neurological: Negative for dizziness, tremors, seizures, syncope, facial asymmetry, speech difficulty and light-headedness.   Hematological: Negative for adenopathy. Does not bruise/bleed easily.   Psychiatric/Behavioral: Negative for behavioral problems,  "confusion, decreased concentration, hallucinations and sleep disturbance.        Objective:  Vitals:    03/08/19 1144   BP: 132/74   BP Location: Left arm   Patient Position: Sitting   Cuff Size: Adult   Pulse: 72   Resp: 16   Weight: 64.9 kg (143 lb)   Height: 157.5 cm (62\")       Neurologic Exam     Mental Status   Oriented to person, place, and time.   Registration: recalls 3 of 3 objects. Recall at 5 minutes: recalls 3 of 3 objects.   Attention: normal. Concentration: normal.   Speech: speech is normal   Level of consciousness: alert  Knowledge: good and consistent with education.   Normal comprehension.     Cranial Nerves     CN II   Visual fields full to confrontation.   Visual acuity: normal  Right visual field deficit: none  Left visual field deficit: none     CN III, IV, VI   Nystagmus: none   Diplopia: none  Ophthalmoparesis: none  Upgaze: normal  Downgaze: normal  Conjugate gaze: present    CN V   Facial sensation intact.   Right corneal reflex: normal  Left corneal reflex: normal    CN VII   Right facial weakness: none  Left facial weakness: none    CN VIII   Hearing: intact    CN IX, X   Palate: symmetric  Right gag reflex: normal  Left gag reflex: normal    CN XI   Right sternocleidomastoid strength: normal  Left sternocleidomastoid strength: normal    CN XII   Tongue: not atrophic  Fasciculations: absent  Tongue deviation: none    Motor Exam   Muscle bulk: normal  Overall muscle tone: normal  Right arm tone: normal  Left arm tone: normal  Right leg tone: normal  Left leg tone: normal    Sensory Exam   Light touch normal.     Gait, Coordination, and Reflexes     Tremor   Resting tremor: absent  Intention tremor: absent  Action tremor: absent    Reflexes   Reflexes 2+ except as noted.       Physical Exam   Constitutional: She is oriented to person, place, and time.   Neurological: She is oriented to person, place, and time.   Psychiatric: Her speech is normal.       Assessment/Plan:       Problems " Addressed this Visit        Digestive    Vitamin D deficiency     Check Vit D Level             Nervous and Auditory    Essential tremor     Sx resolved with treating MS          Multiple sclerosis (CMS/HCC) - Primary     Continues on Aubagio neurologically stable     CBC,CMP    MRI Brain          Relevant Orders    CBC & Differential    Comprehensive Metabolic Panel    MRI Brain With & Without Contrast    Vitamin D 25 Hydroxy    Ambulatory Referral to Speech Therapy       Genitourinary    Urinary urgency     Sx improved on Toviaz                          Intact

## 2019-07-01 NOTE — THERAPY PROGRESS REPORT/RE-CERT
Outpatient Speech Language Pathology   Adult Speech Language Cognitive Progress Note   [place]     Patient Name: Nicky Owens  : 1955  MRN: [id number]  Today's Date: 2019         Visit Date: 2019   Patient Active Problem List   Diagnosis   • Anemia   • Anorexia   • Anxiety   • Benign essential hypertension   • Bone cyst   • H/O colonoscopy   • Degeneration of intervertebral disc of lumbar region   • Dysphagia   • Erosive esophagitis   • Essential tremor   • Hyperlipidemia   • Hyperreflexia   • Hypothyroidism   • Multiple sclerosis (CMS/HCC)   • Heart murmur   • Muscle weakness   • Nausea   • Nonallopathic lesion of cervical region   • Osteoporosis   • Shoulder pain   • Weakness   • Avitaminosis D   • Cobalamin deficiency   • Tremor   • Acute gastritis   • Abdominal pain RUQ   • Visit for screening mammogram   • Bruising   • Urinary urgency   • Major depressive disorder with current active episode          Visit Dx:    ICD-10-CM ICD-9-CM   1. Multiple sclerosis (CMS/HCC) G35 340                         SLP OP Goals     Row Name 19 1300          Goal Type Needed    Goal Type Needed  Executive Function;Other Adult Goals;Memory  -HG        Subjective Comments    Subjective Comments  Pt alert, cooperative,   -HG        Executive Function Goals    Patient will be able to use high level cognitive skills to allow patient to return to work  100%:;with cues  -HG     Status: Patient will be able to use high level cognitive skills to allow patient to return to work  New  -HG     Patient will improve executive functioning skills by maintaining conversation or task  90%:;with cues  -HG     Status: Patient will improve executive functioning skills by maintaining conversation or task  Progressing as expected  -HG     Comments: Patient will improve executive functioning skills by maintaining conversation or task  19: High level cog task for direction following and completing in order and pt was  100% accurate.   -HG     Patient will improve executive functioning skills by using self-monitoring strategies during functional tasks  90%:;with cues  -HG     Status: Patient will improve executive functioning skills by using self-monitoring strategies during functional tasks  Progress slower than expected  -HG     Comments: Patient will improve executive functioning skills by using self-monitoring strategies during functional tasks  7/1/19: Completing tasks with accuracy of Exec Fxn and pt was 80% accurate.   -HG        Memory Goals    Status: Patient will be able to remember  information needed to participate in activities of daily living  New  -HG     Patient will demonstrate improved ability to recall information by immediately recalling a series of words  90%:;unrelated;after delay  -HG     Status: Patient will demonstrate improved ability to recall information by immediately recalling a series of words  Progressing as expected  -HG     Comments: Patient will demonstrate improved ability to recall information by immediately recalling a series of words  7/1/19: Immediate recall of a picture scene and pt was 60% accurate. 6/17/19: Immediate recall of word placement by exclusion and placement and pt was % accurate with 4 and 5 words.   -HG     Patient’s memory skills will be enhanced as reported by patient by utilizing internal memory strategies to recall up to 3 pieces of information after a 5- minute delay  90%:;with cues  -HG     Status: Patient’s memory skills will be enhanced as reported by patient by utilizing internal memory strategies to recall up to 3 pieces of information after a 5- minute delay  Progressing as expected  -HG     Comments: Patient’s memory skills will be enhanced as reported by patient by utilizing internal memory strategies to recall up to 3 pieces of information after a 5- minute delay  7/1/19: Delayed recall of story, pt was 4/5.  6/17/19: Delayed recall of 6 un-related words  and pt was 6/6 x 2, 7 un-related words: pt was 7/7 x 2, 8 un-related words: pt was 6/8.   -HG        Other Goals    Other Adult Goal- 1  Pt will complete high level thought organization tasks with 90% accuracy.  -HG     Status: Other Adult Goal- 1  Progressing as expected  -HG     Comments: Other Adult Goal- 1  7/1/19: High level attention tasks and pt was 90% accurate. 6/17/19: abstract divergent naming: pt was 10/10, 8/10. Scattergories and pt was 80% accurate.   -HG     Other Adult Goal- 2  Pt will improve RBANS Total Score to at least a 110 placing pt in the High Average range,  -HG     Status: Other Adult Goal- 2  New  -HG        SLP Time Calculation    SLP Goal Re-Cert Due Date  07/31/19  -HG       User Key  (r) = Recorded By, (t) = Taken By, (c) = Cosigned By    Initials Name Provider Type    Kaylee Vallejo MS CCC-SLP Speech and Language Pathologist          OP SLP Education     Row Name 07/01/19 1300       Education    Barriers to Learning  No barriers identified  -    Education Provided  Patient demonstrated recommended strategies;Patient requires further education on strategies, risks  -    Assessed  Learning needs;Learning motivation;Learning preferences;Learning readiness  -    Learning Motivation  Strong  -    Learning Method  Explanation;Demonstration;Teach back;Written materials  -    Teaching Response  Verbalized understanding;Demonstrated understanding;Reinforcement needed  -    Education Comments  Hmwk for   -HG      User Key  (r) = Recorded By, (t) = Taken By, (c) = Cosigned By    Initials Name Effective Dates     Kaylee Roldan MS CCC-SLP 06/22/15 -           OP SLP Assessment/Plan - 07/01/19 1300        SLP Assessment    Functional Problems  Speech Language- Adult/Cognition   -HG    Impact on Function: Adult Speech Language/Cognition  Trouble learning or remembering new information   -    Clinical Impression: Speech Language-Adult/Congnition  Mild:;Cognitive  Communication Impairment   -HG    Clinical Impression Comments  Formal re-assessment not completed due to only 3 sessions completed to date.   -HG    Please refer to paper survey for additional self-reported information  No   -HG    Please refer to items scanned into chart for additional diagnostic informaiton and handouts as provided by clinician  No   -HG    SLP Diagnosis  Mild Cognitive Impairment   -HG    Prognosis  Excellent (comment)   -HG    Patient/caregiver participated in establishment of treatment plan and goals  Yes   -HG    Patient would benefit from skilled therapy intervention  Yes   -HG       SLP Plan    Frequency  1x/week   -HG    Duration  8 weeks   -HG    Planned CPT's?  SLP INDIVIDUAL SPEECH THERAPY: 52338   -    Expected Duration Therapy Session - minutes  45-60 minutes   -HG    Plan Comments  Cont with Cog tx- high level tasks.    -HG      User Key  (r) = Recorded By, (t) = Taken By, (c) = Cosigned By    Initials Name Provider Type     Kaylee Roldan MS CCC-SLP Speech and Language Pathologist                 Time Calculation:   SLP Start Time: 1300    Therapy Charges for Today     Code Description Service Date Service Provider Modifiers Qty    [id number]  ST TREATMENT SPEECH 4 7/1/2019 Kaylee Roldan MS CCC-SLP GN 1                   Kaylee Roldan MS CCC-SLP  7/1/2019

## 2019-07-15 ENCOUNTER — HOSPITAL ENCOUNTER (OUTPATIENT)
Dept: SPEECH THERAPY | Facility: HOSPITAL | Age: 64
Setting detail: THERAPIES SERIES
Discharge: HOME | End: 2019-07-15

## 2019-07-15 DIAGNOSIS — G35 MULTIPLE SCLEROSIS (HCC): Primary | ICD-10-CM

## 2019-07-15 PROCEDURE — 92507 TX SP LANG VOICE COMM INDIV: CPT

## 2019-07-15 NOTE — THERAPY TREATMENT NOTE
Outpatient Speech Language Pathology   Adult Speech Language Cognitive Treatment Note   [place]     Patient Name: Nicky Owens  : 1955  MRN: [id number]  Today's Date: 7/15/2019         Visit Date: 07/15/2019   Patient Active Problem List   Diagnosis   • Anemia   • Anorexia   • Anxiety   • Benign essential hypertension   • Bone cyst   • H/O colonoscopy   • Degeneration of intervertebral disc of lumbar region   • Dysphagia   • Erosive esophagitis   • Essential tremor   • Hyperlipidemia   • Hyperreflexia   • Hypothyroidism   • Multiple sclerosis (CMS/HCC)   • Heart murmur   • Muscle weakness   • Nausea   • Nonallopathic lesion of cervical region   • Osteoporosis   • Shoulder pain   • Weakness   • Avitaminosis D   • Cobalamin deficiency   • Tremor   • Acute gastritis   • Abdominal pain RUQ   • Visit for screening mammogram   • Bruising   • Urinary urgency   • Major depressive disorder with current active episode          Visit Dx:    ICD-10-CM ICD-9-CM   1. Multiple sclerosis (CMS/HCC) G35 340                         SLP OP Goals     Row Name 07/15/19 1400          Goal Type Needed    Goal Type Needed  Executive Function;Other Adult Goals;Memory  -HG        Subjective Comments    Subjective Comments  Pt alert, cooperative, did not do homework-assigned for next week.   -HG        Executive Function Goals    Patient will be able to use high level cognitive skills to allow patient to return to work  100%:;with cues  -HG     Status: Patient will be able to use high level cognitive skills to allow patient to return to work  New  -HG     Patient will improve executive functioning skills by maintaining conversation or task  90%:;with cues  -HG     Status: Patient will improve executive functioning skills by maintaining conversation or task  Progressing as expected  -HG     Comments: Patient will improve executive functioning skills by maintaining conversation or task  19: High level cog task for direction  following and completing in order and pt was 100% accurate.   -HG     Patient will improve executive functioning skills by using self-monitoring strategies during functional tasks  90%:;with cues  -HG     Status: Patient will improve executive functioning skills by using self-monitoring strategies during functional tasks  Progress slower than expected  -HG     Comments: Patient will improve executive functioning skills by using self-monitoring strategies during functional tasks  7/1/19: Completing tasks with accuracy of Exec Fxn and pt was 80% accurate.   -HG        Memory Goals    Status: Patient will be able to remember  information needed to participate in activities of daily living  New  -HG     Patient will demonstrate improved ability to recall information by immediately recalling a series of words  90%:;unrelated;after delay  -HG     Status: Patient will demonstrate improved ability to recall information by immediately recalling a series of words  Progressing as expected  -HG     Comments: Patient will demonstrate improved ability to recall information by immediately recalling a series of words  7/15/19: Immediate recall for ABC game and pt was 90% accurate with use of ABC prompts in order to not lose track of the alphabet. 7/1/19: Immediate recall of a picture scene and pt was 60% accurate. 6/17/19: Immediate recall of word placement by exclusion and placement and pt was % accurate with 4 and 5 words.   -HG     Patient’s memory skills will be enhanced as reported by patient by utilizing internal memory strategies to recall up to 3 pieces of information after a 5- minute delay  90%:;with cues  -HG     Status: Patient’s memory skills will be enhanced as reported by patient by utilizing internal memory strategies to recall up to 3 pieces of information after a 5- minute delay  Progressing as expected  -HG     Comments: Patient’s memory skills will be enhanced as reported by patient by utilizing internal  memory strategies to recall up to 3 pieces of information after a 5- minute delay  7/15/19: Delayed recall of ABC items and pt was 100% accurate with visual prompt. 7/1/19: Delayed recall of story, pt was 4/5.  6/17/19: Delayed recall of 6 un-related words and pt was 6/6 x 2, 7 un-related words: pt was 7/7 x 2, 8 un-related words: pt was 6/8.   -HG        Other Goals    Other Adult Goal- 1  Pt will complete high level thought organization tasks with 90% accuracy.  -HG     Status: Other Adult Goal- 1  Progressing as expected  -HG     Comments: Other Adult Goal- 1  7/15/19: High level attention tasks (PASAT) for 3 seconds: pt was 57/60,  every 2 seconds:  7/1/19: High level attention tasks and pt was 90% accurate. 6/17/19: abstract divergent naming: pt was 10/10, 8/10. Scattergories and pt was 80% accurate.   -HG     Other Adult Goal- 2  Pt will improve RBANS Total Score to at least a 110 placing pt in the High Average range,  -HG     Status: Other Adult Goal- 2  New  -HG        SLP Time Calculation    SLP Goal Re-Cert Due Date  07/31/19  -HG       User Key  (r) = Recorded By, (t) = Taken By, (c) = Cosigned By    Initials Name Provider Type    Kaylee Vallejo MS CCC-SLP Speech and Language Pathologist          OP SLP Education     Row Name 07/15/19 1400       Education    Education Comments  Hmwk for shape and figure recall.   -HG      User Key  (r) = Recorded By, (t) = Taken By, (c) = Cosigned By    Initials Name Effective Dates    Kaylee Vallejo MS CCC-SLP 06/22/15 -           OP SLP Assessment/Plan - 07/15/19 1500        SLP Plan    Plan Comments  --   -HG      User Key  (r) = Recorded By, (t) = Taken By, (c) = Cosigned By    Initials Name Provider Type    Kaylee Vallejo MS CCC-SLP Speech and Language Pathologist                 Time Calculation:   SLP Start Time: 1400    Therapy Charges for Today     Code Description Service Date Service Provider Modifiers Qty    [id number] HC ST TREATMENT  SPEECH 4 7/15/2019 Kaylee Roldan, MS CCC-SLP GN 1                   Kaylee Roldan, MS ARELIS-SLP  7/15/2019

## 2019-07-18 ENCOUNTER — SPECIALTY PHARMACY (OUTPATIENT)
Dept: ONCOLOGY | Facility: HOSPITAL | Age: 64
End: 2019-07-18

## 2019-07-22 ENCOUNTER — HOSPITAL ENCOUNTER (OUTPATIENT)
Dept: SPEECH THERAPY | Facility: HOSPITAL | Age: 64
Setting detail: THERAPIES SERIES
Discharge: HOME | End: 2019-07-22

## 2019-07-22 DIAGNOSIS — G35 MULTIPLE SCLEROSIS (HCC): Primary | ICD-10-CM

## 2019-07-22 PROCEDURE — 92507 TX SP LANG VOICE COMM INDIV: CPT

## 2019-07-22 NOTE — THERAPY TREATMENT NOTE
Outpatient Speech Language Pathology   Adult Speech Language Cognitive Treatment Note   [place]     Patient Name: Nicky Owens  : 1955  MRN: [id number]  Today's Date: 2019         Visit Date: 2019   Patient Active Problem List   Diagnosis   • Anemia   • Anorexia   • Anxiety   • Benign essential hypertension   • Bone cyst   • H/O colonoscopy   • Degeneration of intervertebral disc of lumbar region   • Dysphagia   • Erosive esophagitis   • Essential tremor   • Hyperlipidemia   • Hyperreflexia   • Hypothyroidism   • Multiple sclerosis (CMS/HCC)   • Heart murmur   • Muscle weakness   • Nausea   • Nonallopathic lesion of cervical region   • Osteoporosis   • Shoulder pain   • Weakness   • Avitaminosis D   • Cobalamin deficiency   • Tremor   • Acute gastritis   • Abdominal pain RUQ   • Visit for screening mammogram   • Bruising   • Urinary urgency   • Major depressive disorder with current active episode          Visit Dx:    ICD-10-CM ICD-9-CM   1. Multiple sclerosis (CMS/HCC) G35 340                         SLP OP Goals     Row Name 19 1400          Goal Type Needed    Goal Type Needed  Executive Function;Other Adult Goals;Memory  -HG        Subjective Comments    Subjective Comments  Pt alert, cooperative, returned with   -HG        Executive Function Goals    Patient will be able to use high level cognitive skills to allow patient to return to work  100%:;with cues  -HG     Status: Patient will be able to use high level cognitive skills to allow patient to return to work  New  -HG     Patient will improve executive functioning skills by maintaining conversation or task  90%:;with cues  -HG     Status: Patient will improve executive functioning skills by maintaining conversation or task  Progressing as expected  -HG     Comments: Patient will improve executive functioning skills by maintaining conversation or task  19: High level attention and recall task of 6 shapes and figures:  pt was 90% accurate.  7/1/19: High level cog task for direction following and completing in order and pt was 100% accurate.   -HG     Patient will improve executive functioning skills by using self-monitoring strategies during functional tasks  90%:;with cues  -HG     Status: Patient will improve executive functioning skills by using self-monitoring strategies during functional tasks  Progress slower than expected  -HG     Comments: Patient will improve executive functioning skills by using self-monitoring strategies during functional tasks  7/1/19: Completing tasks with accuracy of Exec Fxn and pt was 80% accurate.   -HG        Memory Goals    Status: Patient will be able to remember  information needed to participate in activities of daily living  New  -HG     Patient will demonstrate improved ability to recall information by immediately recalling a series of words  90%:;unrelated;after delay  -HG     Status: Patient will demonstrate improved ability to recall information by immediately recalling a series of words  Progressing as expected  -HG     Comments: Patient will demonstrate improved ability to recall information by immediately recalling a series of words  7/22/19: Immediate recall of 16 groupable words and pt was 16/16.  7/15/19: Immediate recall for ABC game and pt was 90% accurate with use of ABC prompts in order to not lose track of the alphabet. 7/1/19: Immediate recall of a picture scene and pt was 60% accurate. 6/17/19: Immediate recall of word placement by exclusion and placement and pt was % accurate with 4 and 5 words.   -HG     Patient’s memory skills will be enhanced as reported by patient by utilizing internal memory strategies to recall up to 3 pieces of information after a 5- minute delay  90%:;with cues  -HG     Status: Patient’s memory skills will be enhanced as reported by patient by utilizing internal memory strategies to recall up to 3 pieces of information after a 5- minute delay   Progressing as expected  -HG     Comments: Patient’s memory skills will be enhanced as reported by patient by utilizing internal memory strategies to recall up to 3 pieces of information after a 5- minute delay  7/22/19: Delayed recall of shapes/figures from hmwk and pt was 100% accurate. Delayed recall of 16 groupable words and pt was  7/15/19: Delayed recall of ABC items and pt was 100% accurate with visual prompt. 7/1/19: Delayed recall of story, pt was 4/5.  6/17/19: Delayed recall of 6 un-related words and pt was 6/6 x 2, 7 un-related words: pt was 7/7 x 2, 8 un-related words: pt was 6/8.   -HG        Other Goals    Other Adult Goal- 1  Pt will complete high level thought organization tasks with 90% accuracy.  -HG     Status: Other Adult Goal- 1  Progressing as expected  -HG     Comments: Other Adult Goal- 1  7/22/19: Card game of Speed and pt was 85% accurate.  7/15/19: High level attention tasks (PASAT) for 3 seconds: pt was 57/60,  every 2 seconds: 46/60.  7/1/19: High level attention tasks and pt was 90% accurate. 6/17/19: abstract divergent naming: pt was 10/10, 8/10. Scattergories and pt was 80% accurate.   -HG     Other Adult Goal- 2  Pt will improve RBANS Total Score to at least a 110 placing pt in the High Average range,  -HG     Status: Other Adult Goal- 2  New  -HG        SLP Time Calculation    SLP Goal Re-Cert Due Date  07/31/19  -HG       User Key  (r) = Recorded By, (t) = Taken By, (c) = Cosigned By    Initials Name Provider Type    Kaylee Vallejo MS AMADA CCC-SLP Speech and Language Pathologist          OP SLP Education     Row Name 07/22/19 1400       Education    Education Comments  Hmwk for playing the game of Speed with her .   -HG      User Key  (r) = Recorded By, (t) = Taken By, (c) = Cosigned By    Initials Name Effective Dates    MAGDALENA Yuli Kayleeher AMADA MS CCC-SLP 06/22/15 -           OP SLP Assessment/Plan - 07/22/19 1400        SLP Plan    Plan Comments  Cont with Cog tx. with  plans to re-assess in the next session.    -      User Key  (r) = Recorded By, (t) = Taken By, (c) = Cosigned By    Initials Name Provider Type    Kaylee Vallejo MS CCC-SLP Speech and Language Pathologist                 Time Calculation:   SLP Start Time: 1400    Therapy Charges for Today     Code Description Service Date Service Provider Modifiers Qty    [id number]  ST TREATMENT SPEECH 4 7/22/2019 Kaylee oRldan MS CCC-SLP GN 1                   Kaylee Roldan MS CCC-SLP  7/22/2019

## 2019-07-29 ENCOUNTER — OFFICE VISIT (OUTPATIENT)
Dept: NEUROLOGY | Facility: CLINIC | Age: 64
End: 2019-07-29

## 2019-07-29 VITALS
HEIGHT: 62 IN | BODY MASS INDEX: 25.76 KG/M2 | SYSTOLIC BLOOD PRESSURE: 118 MMHG | WEIGHT: 140 LBS | DIASTOLIC BLOOD PRESSURE: 78 MMHG | OXYGEN SATURATION: 99 % | HEART RATE: 68 BPM | RESPIRATION RATE: 16 BRPM

## 2019-07-29 DIAGNOSIS — G25.0 ESSENTIAL TREMOR: ICD-10-CM

## 2019-07-29 DIAGNOSIS — G35 MULTIPLE SCLEROSIS (HCC): Primary | ICD-10-CM

## 2019-07-29 DIAGNOSIS — F32.9 MAJOR DEPRESSIVE DISORDER WITH CURRENT ACTIVE EPISODE, UNSPECIFIED DEPRESSION EPISODE SEVERITY, UNSPECIFIED WHETHER RECURRENT: ICD-10-CM

## 2019-07-29 PROCEDURE — 99214 OFFICE O/P EST MOD 30 MIN: CPT | Performed by: PSYCHIATRY & NEUROLOGY

## 2019-07-29 RX ORDER — LEVOTHYROXINE SODIUM 0.07 MG/1
TABLET ORAL
COMMUNITY
Start: 2019-06-15 | End: 2019-12-06 | Stop reason: DRUGHIGH

## 2019-07-29 NOTE — PROGRESS NOTES
Subjective:   Chief Complaint   Patient presents with   • Multiple Sclerosis       Patient ID: Nicky Owens is a 63 y.o. female.    History of Present Illness     63 y.o. woman with RRMS and essential tremors returns in follow up.  Last visit on 6/3/19 continued Aubagio, Toviaz and added Lexapro.     MRI Brain,  3/28/19 as compared to 12/26/17,  no new, enlarging or enhancing lesions       6/3/19:  Celiac panel negative     5/31/19: Vit D  10.3; TSH 4.43; ALT 23, AST 21    MDD    Mood improved on Lexapro but difficult to arouse in mornings.     Motivation improved.  Rarely feel like they are shaking.     RRMS    Fatigue is mild.      Increased tremors.      Tolerating Aubagio without side effects.      Essential Tremor    Tremor improved    Urinary urgency     Toviaz controlling frequency and urgrency but slight worsening with stress.        The following portions of the patient's history were reviewed and updated as appropriate: allergies, current medications, past medical history, past surgical history and problem list.    Review of Systems   Constitutional: Negative for activity change and unexpected weight change.   HENT: Negative for tinnitus and trouble swallowing.    Eyes: Negative for photophobia and visual disturbance.   Respiratory: Negative for apnea and choking.    Cardiovascular: Negative for leg swelling.   Endocrine: Positive for heat intolerance. Negative for cold intolerance.   Genitourinary: Positive for frequency and urgency. Negative for difficulty urinating and menstrual problem.   Musculoskeletal: Negative for back pain and gait problem.   Skin: Negative for color change.   Allergic/Immunologic: Negative for immunocompromised state.   Neurological: Positive for tremors. Negative for dizziness, seizures, syncope, facial asymmetry, speech difficulty and light-headedness.   Hematological: Negative for adenopathy. Does not bruise/bleed easily.   Psychiatric/Behavioral: Positive for dysphoric  "mood. Negative for behavioral problems, confusion, decreased concentration, hallucinations and sleep disturbance. The patient is nervous/anxious.         Objective:  Vitals:    07/29/19 1116   BP: 118/78   BP Location: Left arm   Patient Position: Sitting   Cuff Size: Adult   Pulse: 68   Resp: 16   SpO2: 99%   Weight: 63.5 kg (140 lb)   Height: 157.5 cm (62\")       Neurologic Exam     Mental Status   Oriented to person, place, and time.   Registration: recalls 3 of 3 objects. Recall at 5 minutes: recalls 3 of 3 objects.   Attention: normal. Concentration: normal.   Speech: speech is normal   Level of consciousness: alert  Knowledge: good and consistent with education.   Normal comprehension.     Cranial Nerves     CN II   Visual fields full to confrontation.   Visual acuity: normal  Right visual field deficit: none  Left visual field deficit: none     CN III, IV, VI   Nystagmus: none   Diplopia: none  Ophthalmoparesis: none  Upgaze: normal  Downgaze: normal  Conjugate gaze: present    CN V   Facial sensation intact.   Right corneal reflex: normal  Left corneal reflex: normal    CN VII   Right facial weakness: none  Left facial weakness: none    CN VIII   Hearing: intact    CN IX, X   Palate: symmetric  Right gag reflex: normal  Left gag reflex: normal    CN XI   Right sternocleidomastoid strength: normal  Left sternocleidomastoid strength: normal    CN XII   Tongue: not atrophic  Fasciculations: absent  Tongue deviation: none    Motor Exam   Muscle bulk: normal  Overall muscle tone: normal  Right arm tone: normal  Left arm tone: normal  Right leg tone: normal  Left leg tone: normal    Sensory Exam   Light touch normal.     Gait, Coordination, and Reflexes     Tremor   Resting tremor: absent  Intention tremor: absent  Action tremor: absent    Reflexes   Reflexes 2+ except as noted.       Physical Exam   Constitutional: She is oriented to person, place, and time.   Neurological: She is oriented to person, place, and " time.   Psychiatric: Her speech is normal.       Assessment/Plan:       Problems Addressed this Visit        Nervous and Auditory    Essential tremor     Sx controlled          Multiple sclerosis (CMS/HCC) - Primary     Sx stable on Aubagio             Other    Major depressive disorder with current active episode     Psychological condition is improving with treatment.  Continue current treatment regimen.  Psychological condition  will be reassessed at the next regular appointment.

## 2019-08-05 ENCOUNTER — TELEPHONE (OUTPATIENT)
Dept: INTERNAL MEDICINE | Facility: CLINIC | Age: 64
End: 2019-08-05

## 2019-08-05 ENCOUNTER — APPOINTMENT (OUTPATIENT)
Dept: SPEECH THERAPY | Facility: HOSPITAL | Age: 64
End: 2019-08-05

## 2019-08-26 ENCOUNTER — HOSPITAL ENCOUNTER (OUTPATIENT)
Dept: SPEECH THERAPY | Facility: HOSPITAL | Age: 64
Setting detail: THERAPIES SERIES
Discharge: HOME | End: 2019-08-26

## 2019-08-26 DIAGNOSIS — G35 MULTIPLE SCLEROSIS (HCC): Primary | ICD-10-CM

## 2019-08-26 PROCEDURE — 92507 TX SP LANG VOICE COMM INDIV: CPT

## 2019-08-26 NOTE — THERAPY DISCHARGE NOTE
Outpatient Speech Language Pathology   Adult Speech Language Cognitive Progress Note/Discharge Summary   [place]     Patient Name: Nicky Owens  : 1955  MRN: [id number]  Today's Date: 2019         Visit Date: 2019   Patient Active Problem List   Diagnosis   • Anemia   • Anorexia   • Anxiety   • Benign essential hypertension   • Bone cyst   • H/O colonoscopy   • Degeneration of intervertebral disc of lumbar region   • Dysphagia   • Erosive esophagitis   • Essential tremor   • Hyperlipidemia   • Hyperreflexia   • Hypothyroidism   • Multiple sclerosis (CMS/HCC)   • Heart murmur   • Muscle weakness   • Nausea   • Nonallopathic lesion of cervical region   • Osteoporosis   • Shoulder pain   • Weakness   • Avitaminosis D   • Cobalamin deficiency   • Tremor   • Acute gastritis   • Abdominal pain RUQ   • Visit for screening mammogram   • Bruising   • Urinary urgency   • Major depressive disorder with current active episode          Visit Dx:    ICD-10-CM ICD-9-CM   1. Multiple sclerosis (CMS/HCC) G35 340                       SLP OP Goals     Row Name 19 1300          Goal Type Needed    Goal Type Needed  Executive Function;Other Adult Goals;Memory  -HG        Subjective Comments    Subjective Comments  Pt alert, cooperative, feels like she has regressed somewhat since not coming to therapy for a few weeks.    -HG        Executive Function Goals    Patient will be able to use high level cognitive skills to allow patient to return to work  100%:;with cues  -HG     Status: Patient will be able to use high level cognitive skills to allow patient to return to work  New  -HG     Patient will improve executive functioning skills by maintaining conversation or task  90%:;with cues  -HG     Status: Patient will improve executive functioning skills by maintaining conversation or task  Achieved  -HG     Comments: Patient will improve executive functioning skills by maintaining conversation or task   8/26/19: Pt recently traveled to [place] and was able to navigate and keep with her friends at a high level pace. 7/22/19: High level attention and recall task of 6 shapes and figures: pt was 90% accurate.  7/1/19: High level cog task for direction following and completing in order and pt was 100% accurate.   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 7/1/19: Completing tasks with accuracy of Exec Fxn and pt was 80% accurate.   -HG        Memory Goals    Status: Patient will be able to remember  information needed to participate in activities of daily living  Progressing as expected;Achieved  -HG     Comments: Patient will be able to remember  information needed to participate in activities of daily living  8/26/19: Pt is functional with her phone, calendar/planner.   -HG     Patient will demonstrate improved ability to recall information by immediately recalling a series of words  90%:;unrelated;after delay  -HG     Status: Patient will demonstrate improved ability to recall information by immediately recalling a series of words  Progressing as expected  -HG     Comments: Patient will demonstrate improved ability to recall information by immediately recalling a series of words  8/26/19: RBANS Immediate recall score of 97 places pt in the average range. up from 87 at time of eval. 7/22/19: Immediate recall of 16 groupable words and pt was 16/16.  7/15/19: Immediate recall for ABC game and pt was 90% accurate with use of ABC prompts in order to not lose track of the  alphabet. 7/1/19: Immediate recall of a picture scene and pt was 60% accurate. 6/17/19: Immediate recall of word placement by exclusion and placement and pt was % accurate with 4 and 5 words.   -HG     Patient’s memory skills will be enhanced as reported by patient by utilizing internal memory strategies to recall up to 3 pieces of information after a 5- minute delay  90%:;with cues  -HG     Status: Patient’s memory skills will be enhanced as reported by patient by utilizing internal memory strategies to recall up to 3 pieces of information after a 5- minute delay  Progressing as expected  -HG     Comments: Patient’s memory skills will be enhanced as reported by patient by utilizing internal memory strategies to recall up to 3 pieces of information after a 5- minute delay  8/26/19: RBANS Delayed recall score of 102 places pt in the average range, up from 88 at time of eval. 7/22/19: Delayed recall of shapes/figures from hmwk and pt was 100% accurate. Delayed recall of 16 groupable words and pt was  7/15/19: Delayed recall of ABC items and pt was 100% accurate with visual prompt. 7/1/19: Delayed recall of story, pt was 4/5.  6/17/19: Delayed recall of 6 un-related words and pt was 6/6 x 2, 7 un-related words: pt was 7/7 x 2, 8 un-related words: pt was 6/8.   -HG        Other Goals    Other Adult Goal- 1  Pt will complete high level thought organization tasks with 90% accuracy.  -HG     Status: Other Adult Goal- 1  Achieved  -HG     Comments: Other Adult Goal- 1  8/26/19: RBANS Language score was 104, average range; up from 96 at time of eval. 7/22/19: Card game of Speed and pt was 85% accurate.  7/15/19: High level attention tasks (PASAT) for 3 seconds: pt was 57/60,  every 2 seconds: 46/60.  7/1/19: High level attention tasks and pt was 90% accurate. 6/17/19: abstract divergent naming: pt was 10/10, 8/10. Scattergories and pt was 80% accurate.   -HG     Other Adult Goal- 2  Pt will improve RBANS Total Score to  at least a 110 placing pt in the High Average range,  -HG     Status: Other Adult Goal- 2  Achieved  -HG     Comments: Other Adult Goal- 2  8/26/19: RBANS Total Score of 112 places pt in the High Average Range.   -HG        SLP Time Calculation    SLP Goal Re-Cert Due Date  09/25/19  -HG       User Key  (r) = Recorded By, (t) = Taken By, (c) = Cosigned By    Initials Name Provider Type    Kaylee Vallejo, MS CCC-SLP Speech and Language Pathologist          OP SLP Education     Row Name 08/26/19 1300       Education    Education Comments  Education completed for apps and other HEP activities to keep pt stimulated and challenged for attention, recall and reasoning.   -HG      User Key  (r) = Recorded By, (t) = Taken By, (c) = Cosigned By    Initials Name Effective Dates    Kaylee Vallejo, MS CCC-SLP 06/22/15 -           OP SLP Assessment/Plan - 08/26/19 1300        SLP Assessment    Functional Problems  Speech Language- Adult/Cognition   -HG    Impact on Function: Adult Speech Language/Cognition  Trouble learning or remembering new information   -HG    Clinical Impression: Speech Language-Adult/Congnition  Cognitive Communication WFL   -HG    Functional Problems Comment  Pt reports that she is struggling per say with anything at this point- benefited from the challenge of skilled ST services.   -HG    Clinical Impression Comments  RBANS re-assessment Total Score of 112 places pt in the High Average range, up from 96 at time of eval.     -HG    Please refer to paper survey for additional self-reported information  Yes   -HG    Please refer to items scanned into chart for additional diagnostic informaiton and handouts as provided by clinician  Yes   -HG    SLP Diagnosis  Functional Cognition   -HG    Prognosis  Excellent (comment)   -HG       SLP Plan    Plan Comments  D/C from skilled services at this time.    -HG      User Key  (r) = Recorded By, (t) = Taken By, (c) = Cosigned By    Initials Name Provider  Type    HG Kaylee Roldan, MS CCC-SLP Speech and Language Pathologist                   Time Calculation:   SLP Start Time: 1300    Therapy Charges for Today     Code Description Service Date Service Provider Modifiers Qty    [id number] [facility] TREATMENT SPEECH 5 8/26/2019 Kaylee Roldan, MS CCC-SLP GN 1                        Kaylee Roldan MS CCC-SLP  8/26/2019

## 2019-12-05 ENCOUNTER — TELEPHONE (OUTPATIENT)
Dept: ENDOCRINOLOGY | Facility: CLINIC | Age: 64
End: 2019-12-05

## 2019-12-05 NOTE — TELEPHONE ENCOUNTER
BRENT,    PATIENT IS CALLING TO LET YOU KNOW THAT SHE WILL BE KEEPING HER APPT FOR TOMORROW 12/6/19. I DID CX HER APPT FOR January, PER PT. THANKS

## 2019-12-06 ENCOUNTER — OFFICE VISIT (OUTPATIENT)
Dept: ENDOCRINOLOGY | Facility: CLINIC | Age: 64
End: 2019-12-06

## 2019-12-06 VITALS
HEART RATE: 70 BPM | OXYGEN SATURATION: 99 % | BODY MASS INDEX: 26.28 KG/M2 | SYSTOLIC BLOOD PRESSURE: 116 MMHG | DIASTOLIC BLOOD PRESSURE: 64 MMHG | WEIGHT: 142.8 LBS | HEIGHT: 62 IN

## 2019-12-06 DIAGNOSIS — E03.9 ACQUIRED HYPOTHYROIDISM: ICD-10-CM

## 2019-12-06 DIAGNOSIS — I10 BENIGN ESSENTIAL HYPERTENSION: Primary | ICD-10-CM

## 2019-12-06 DIAGNOSIS — E78.2 MIXED HYPERLIPIDEMIA: ICD-10-CM

## 2019-12-06 DIAGNOSIS — E55.9 AVITAMINOSIS D: ICD-10-CM

## 2019-12-06 LAB
25(OH)D3 SERPL-MCNC: 47.3 NG/ML (ref 30–100)
ALBUMIN SERPL-MCNC: 4.3 G/DL (ref 3.5–5.2)
ALBUMIN/GLOB SERPL: 1.4 G/DL
ALP SERPL-CCNC: 64 U/L (ref 39–117)
ALT SERPL W P-5'-P-CCNC: 22 U/L (ref 1–33)
ANION GAP SERPL CALCULATED.3IONS-SCNC: 8.9 MMOL/L (ref 5–15)
AST SERPL-CCNC: 22 U/L (ref 1–32)
BILIRUB SERPL-MCNC: 0.3 MG/DL (ref 0.2–1.2)
BUN BLD-MCNC: 11 MG/DL (ref 8–23)
BUN/CREAT SERPL: 16.7 (ref 7–25)
CALCIUM SPEC-SCNC: 9.1 MG/DL (ref 8.6–10.5)
CHLORIDE SERPL-SCNC: 104 MMOL/L (ref 98–107)
CHOLEST SERPL-MCNC: 168 MG/DL (ref 0–200)
CO2 SERPL-SCNC: 28.1 MMOL/L (ref 22–29)
CREAT BLD-MCNC: 0.66 MG/DL (ref 0.57–1)
GFR SERPL CREATININE-BSD FRML MDRD: 90 ML/MIN/1.73
GLOBULIN UR ELPH-MCNC: 3 GM/DL
GLUCOSE BLD-MCNC: 91 MG/DL (ref 65–99)
HCV AB SER DONR QL: NORMAL
HDLC SERPL-MCNC: 50 MG/DL (ref 40–60)
LDLC SERPL CALC-MCNC: 100 MG/DL (ref 0–100)
LDLC/HDLC SERPL: 2 {RATIO}
POTASSIUM BLD-SCNC: 4.9 MMOL/L (ref 3.5–5.2)
PROT SERPL-MCNC: 7.3 G/DL (ref 6–8.5)
SODIUM BLD-SCNC: 141 MMOL/L (ref 136–145)
TRIGL SERPL-MCNC: 90 MG/DL (ref 0–150)
TSH SERPL DL<=0.05 MIU/L-ACNC: 0.31 UIU/ML (ref 0.27–4.2)
VLDLC SERPL-MCNC: 18 MG/DL (ref 5–40)

## 2019-12-06 PROCEDURE — 99214 OFFICE O/P EST MOD 30 MIN: CPT | Performed by: INTERNAL MEDICINE

## 2019-12-06 PROCEDURE — 90686 IIV4 VACC NO PRSV 0.5 ML IM: CPT | Performed by: INTERNAL MEDICINE

## 2019-12-06 PROCEDURE — 80061 LIPID PANEL: CPT | Performed by: INTERNAL MEDICINE

## 2019-12-06 PROCEDURE — 84443 ASSAY THYROID STIM HORMONE: CPT | Performed by: INTERNAL MEDICINE

## 2019-12-06 PROCEDURE — 86803 HEPATITIS C AB TEST: CPT | Performed by: INTERNAL MEDICINE

## 2019-12-06 PROCEDURE — 80053 COMPREHEN METABOLIC PANEL: CPT | Performed by: INTERNAL MEDICINE

## 2019-12-06 PROCEDURE — 90471 IMMUNIZATION ADMIN: CPT | Performed by: INTERNAL MEDICINE

## 2019-12-06 PROCEDURE — 82306 VITAMIN D 25 HYDROXY: CPT | Performed by: INTERNAL MEDICINE

## 2019-12-06 NOTE — PROGRESS NOTES
Nicky Owens 63 y.o.  CC:Follow-up; Hypertension; Hypothyroidism; Hyperlipidemia; and Multiple Sclerosis      [place]: Follow-up; Hypertension; Hypothyroidism; Hyperlipidemia; and Multiple Sclerosis    bp is good   Is on synthroid 88 mcg daily   Is on low fat diet and zocor 20 mg daily   MS is stable with continued f/u with neurology   She started lexapro after last ov - has helped a lot     Allergies   Allergen Reactions   • Penicillins        Current Outpatient Medications:   •  escitalopram (LEXAPRO) 10 MG tablet, Take 1 tablet by mouth Daily., Disp: 30 tablet, Rfl: 5  •  estradiol (MINIVELLE, VIVELLE-DOT) 0.05 MG/24HR patch, Place 1 patch on the skin 1 (One) Time Per Week., Disp: , Rfl:   •  levothyroxine (SYNTHROID, LEVOTHROID) 88 MCG tablet, Take 1 tablet by mouth Daily., Disp: 90 tablet, Rfl: 1  •  lisinopril (PRINIVIL,ZESTRIL) 10 MG tablet, Take 1 tablet by mouth Daily., Disp: 30 tablet, Rfl: 5  •  simvastatin (ZOCOR) 20 MG tablet, Take 1 tablet by mouth Daily., Disp: 30 tablet, Rfl: 5  •  Teriflunomide (AUBAGIO) 14 MG tablet, Take 14 mg by mouth Daily., Disp: 30 tablet, Rfl: 11  •  TOVIAZ 4 MG tablet sustained-release 24 hour tablet, , Disp: , Rfl:   •  vitamin D (ERGOCALCIFEROL) 69216 units capsule capsule, Take 1 capsule by mouth Every 7 (Seven) Days., Disp: 4 capsule, Rfl: 7  Patient Active Problem List    Diagnosis   • Major depressive disorder with current active episode [F32.9]   • Urinary urgency [R39.15]   • Bruising [T14.8XXA]   • Acute gastritis [K29.00]   • Abdominal pain RUQ [R10.9]   • Visit for screening mammogram [Z12.31]   • Anemia [D64.9]   • Anorexia [R63.0]   • Anxiety [F41.9]   • Benign essential hypertension [I10]   • Bone cyst [M85.60]   • H/O colonoscopy [Z98.890]   • Degeneration of intervertebral disc of lumbar region [M51.36]   • Dysphagia [R13.10]   • Erosive esophagitis [K22.10]   • Essential tremor [G25.0]   • Hyperlipidemia [E78.5]   • Hyperreflexia [R29.2]   • Hypothyroidism  [E03.9]   • Multiple sclerosis (CMS/HCC) [G35]   • Heart murmur [R01.1]   • Muscle weakness [M62.81]   • Nausea [R11.0]   • Nonallopathic lesion of cervical region [M99.9]   • Osteoporosis [M81.0]   • Shoulder pain [M25.519]   • Weakness [R53.1]   • Avitaminosis D [E55.9]   • Cobalamin deficiency [E53.8]   • Tremor [R25.1]     Review of Systems   Constitutional: Positive for fatigue. Negative for activity change, appetite change, chills, diaphoresis, fever and unexpected weight change.   HENT: Negative for congestion, dental problem, drooling, ear discharge, ear pain, facial swelling, hearing loss, mouth sores, nosebleeds, postnasal drip, rhinorrhea, sinus pressure, sneezing, sore throat, tinnitus, trouble swallowing and voice change.    Eyes: Negative for photophobia, pain, discharge, redness, itching and visual disturbance.   Respiratory: Negative for apnea, cough, choking, chest tightness, shortness of breath, wheezing and stridor.    Cardiovascular: Negative for chest pain, palpitations and leg swelling.   Gastrointestinal: Negative for abdominal distention, abdominal pain, anal bleeding, blood in stool, constipation, diarrhea, nausea, rectal pain and vomiting.   Endocrine: Negative for cold intolerance, heat intolerance, polydipsia, polyphagia and polyuria.   Genitourinary: Negative for decreased urine volume, difficulty urinating, dysuria, enuresis, flank pain, frequency, genital sores, hematuria and urgency.   Musculoskeletal: Negative for arthralgias, back pain, gait problem, joint swelling, myalgias, neck pain and neck stiffness.   Skin: Negative for color change, pallor, rash and wound.   Allergic/Immunologic: Negative for environmental allergies, food allergies and immunocompromised state.   Neurological: Negative for dizziness, tremors, seizures, syncope, facial asymmetry, speech difficulty, weakness, light-headedness, numbness and headaches.   Hematological: Negative for adenopathy. Does not  "bruise/bleed easily.   Psychiatric/Behavioral: Positive for dysphoric mood. Negative for agitation, behavioral problems, confusion, decreased concentration, hallucinations, self-injury, sleep disturbance and suicidal ideas. The patient is not nervous/anxious and is not hyperactive.      Social History     Socioeconomic History   • Marital status:      Spouse name: Not on file   • Number of children: Not on file   • Years of education: Not on file   • Highest education level: Not on file   Tobacco Use   • Smoking status: Never Smoker   • Smokeless tobacco: Never Used   Substance and Sexual Activity   • Alcohol use: No   • Drug use: No   • Sexual activity: Defer     Family History   Problem Relation Age of Onset   • Heart disease Mother    • Hypertension Mother    • Heart disease Father    • Hypertension Father    • Hypertension Sister    • Other Sister         malignant neoplasm   • Mental illness Sister    • Stroke Brother    • Hypertension Brother    • Mental illness Brother    • Alzheimer's disease Other         Aunt     /64   Pulse 70   Ht 157.5 cm (62\")   Wt 64.8 kg (142 lb 12.8 oz)   SpO2 99%   BMI 26.12 kg/m²   Physical Exam   Constitutional: She is oriented to person, place, and time. She appears well-developed and well-nourished.   HENT:   Head: Normocephalic and atraumatic.   Nose: Nose normal.   Mouth/Throat: Oropharynx is clear and moist.   Eyes: Conjunctivae, EOM and lids are normal. Pupils are equal, round, and reactive to light.   Neck: Trachea normal and normal range of motion. Neck supple. Carotid bruit is not present. No tracheal deviation present. No thyroid mass and no thyromegaly present.   Cardiovascular: Normal rate, regular rhythm, normal heart sounds and intact distal pulses. Exam reveals no gallop and no friction rub.   No murmur heard.  Pulmonary/Chest: Effort normal and breath sounds normal. No respiratory distress. She has no wheezes.   Musculoskeletal: Normal range of " motion. She exhibits no edema or deformity.   Lymphadenopathy:     She has no cervical adenopathy.   Neurological: She is alert and oriented to person, place, and time. She has normal reflexes. She displays normal reflexes. No cranial nerve deficit.   Skin: Skin is warm and dry. No rash noted. No cyanosis or erythema. Nails show no clubbing.   Psychiatric: She has a normal mood and affect. Her speech is normal and behavior is normal. Judgment and thought content normal. Cognition and memory are normal.   Nursing note and vitals reviewed.    Results for orders placed or performed in visit on 06/03/19   Celiac Disease Panel   Result Value Ref Range    Endomysial IgA Negative Negative    Tissue Transglutaminase IgA <2 0 - 3 U/mL    IgA 143 87 - 352 mg/dL     Problem List Items Addressed This Visit        Cardiovascular and Mediastinum    Hyperlipidemia     Is on low fat diet and zocor 20 mg daily at dinner and later  Check flp          Relevant Orders    Lipid Panel    Benign essential hypertension - Primary     bp is good- continue monitoring and medications          Relevant Orders    Comprehensive Metabolic Panel    Hepatitis C Antibody       Digestive    Avitaminosis D     Is on vitamin D supplement - update levels          Relevant Orders    Vitamin D 25 Hydroxy       Endocrine    Hypothyroidism     Check tfts   Is on synthroid 88 mcg daily          Relevant Orders    TSH        Return in about 6 months (around 6/6/2020) for Recheck 30 min .    Jennifer Rubi MA  Signed Tierney Mata MD

## 2019-12-23 RX ORDER — SIMVASTATIN 20 MG
TABLET ORAL
Qty: 30 TABLET | Refills: 5 | Status: SHIPPED | OUTPATIENT
Start: 2019-12-23 | End: 2020-06-12 | Stop reason: SDDI

## 2019-12-23 RX ORDER — LEVOTHYROXINE SODIUM 88 MCG
TABLET ORAL
Qty: 30 TABLET | Refills: 5 | Status: SHIPPED | OUTPATIENT
Start: 2019-12-23 | End: 2020-06-24

## 2019-12-23 RX ORDER — ESCITALOPRAM OXALATE 10 MG/1
TABLET ORAL
Qty: 30 TABLET | Refills: 4 | Status: SHIPPED | OUTPATIENT
Start: 2019-12-23 | End: 2020-01-27

## 2019-12-23 RX ORDER — LISINOPRIL 10 MG/1
10 TABLET ORAL DAILY
Qty: 30 TABLET | Refills: 5 | Status: SHIPPED | OUTPATIENT
Start: 2019-12-23 | End: 2020-10-27

## 2020-01-27 ENCOUNTER — LAB (OUTPATIENT)
Dept: LAB | Facility: HOSPITAL | Age: 65
End: 2020-01-27

## 2020-01-27 ENCOUNTER — OFFICE VISIT (OUTPATIENT)
Dept: NEUROLOGY | Facility: CLINIC | Age: 65
End: 2020-01-27

## 2020-01-27 VITALS
WEIGHT: 141.2 LBS | HEART RATE: 69 BPM | DIASTOLIC BLOOD PRESSURE: 68 MMHG | OXYGEN SATURATION: 98 % | BODY MASS INDEX: 25.98 KG/M2 | HEIGHT: 62 IN | SYSTOLIC BLOOD PRESSURE: 118 MMHG

## 2020-01-27 DIAGNOSIS — G35 MULTIPLE SCLEROSIS (HCC): ICD-10-CM

## 2020-01-27 DIAGNOSIS — F32.9 MAJOR DEPRESSIVE DISORDER WITH CURRENT ACTIVE EPISODE, UNSPECIFIED DEPRESSION EPISODE SEVERITY, UNSPECIFIED WHETHER RECURRENT: ICD-10-CM

## 2020-01-27 DIAGNOSIS — R39.15 URINARY URGENCY: ICD-10-CM

## 2020-01-27 DIAGNOSIS — G25.0 ESSENTIAL TREMOR: Primary | ICD-10-CM

## 2020-01-27 LAB
ALBUMIN SERPL-MCNC: 4 G/DL (ref 3.5–5.2)
ALBUMIN/GLOB SERPL: 1.5 G/DL
ALP SERPL-CCNC: 57 U/L (ref 39–117)
ALT SERPL W P-5'-P-CCNC: 13 U/L (ref 1–33)
ANION GAP SERPL CALCULATED.3IONS-SCNC: 11.3 MMOL/L (ref 5–15)
AST SERPL-CCNC: 18 U/L (ref 1–32)
BASOPHILS # BLD AUTO: 0.05 10*3/MM3 (ref 0–0.2)
BASOPHILS NFR BLD AUTO: 1.1 % (ref 0–1.5)
BILIRUB SERPL-MCNC: 0.3 MG/DL (ref 0.2–1.2)
BUN BLD-MCNC: 12 MG/DL (ref 8–23)
BUN/CREAT SERPL: 18.2 (ref 7–25)
CALCIUM SPEC-SCNC: 8.9 MG/DL (ref 8.6–10.5)
CHLORIDE SERPL-SCNC: 108 MMOL/L (ref 98–107)
CO2 SERPL-SCNC: 23.7 MMOL/L (ref 22–29)
CREAT BLD-MCNC: 0.66 MG/DL (ref 0.57–1)
DEPRECATED RDW RBC AUTO: 40.4 FL (ref 37–54)
EOSINOPHIL # BLD AUTO: 0.09 10*3/MM3 (ref 0–0.4)
EOSINOPHIL NFR BLD AUTO: 2.1 % (ref 0.3–6.2)
ERYTHROCYTE [DISTWIDTH] IN BLOOD BY AUTOMATED COUNT: 12.5 % (ref 12.3–15.4)
GFR SERPL CREATININE-BSD FRML MDRD: 90 ML/MIN/1.73
GLOBULIN UR ELPH-MCNC: 2.6 GM/DL
GLUCOSE BLD-MCNC: 83 MG/DL (ref 65–99)
HCT VFR BLD AUTO: 37.3 % (ref 34–46.6)
HGB BLD-MCNC: 12.4 G/DL (ref 12–15.9)
IMM GRANULOCYTES # BLD AUTO: 0.01 10*3/MM3 (ref 0–0.05)
IMM GRANULOCYTES NFR BLD AUTO: 0.2 % (ref 0–0.5)
LYMPHOCYTES # BLD AUTO: 1.2 10*3/MM3 (ref 0.7–3.1)
LYMPHOCYTES NFR BLD AUTO: 27.6 % (ref 19.6–45.3)
MCH RBC QN AUTO: 29.8 PG (ref 26.6–33)
MCHC RBC AUTO-ENTMCNC: 33.2 G/DL (ref 31.5–35.7)
MCV RBC AUTO: 89.7 FL (ref 79–97)
MONOCYTES # BLD AUTO: 0.65 10*3/MM3 (ref 0.1–0.9)
MONOCYTES NFR BLD AUTO: 14.9 % (ref 5–12)
NEUTROPHILS # BLD AUTO: 2.35 10*3/MM3 (ref 1.7–7)
NEUTROPHILS NFR BLD AUTO: 54.1 % (ref 42.7–76)
NRBC BLD AUTO-RTO: 0.2 /100 WBC (ref 0–0.2)
PLATELET # BLD AUTO: 182 10*3/MM3 (ref 140–450)
PMV BLD AUTO: 11.7 FL (ref 6–12)
POTASSIUM BLD-SCNC: 4.4 MMOL/L (ref 3.5–5.2)
PROT SERPL-MCNC: 6.6 G/DL (ref 6–8.5)
RBC # BLD AUTO: 4.16 10*6/MM3 (ref 3.77–5.28)
SODIUM BLD-SCNC: 143 MMOL/L (ref 136–145)
WBC NRBC COR # BLD: 4.35 10*3/MM3 (ref 3.4–10.8)

## 2020-01-27 PROCEDURE — 36415 COLL VENOUS BLD VENIPUNCTURE: CPT

## 2020-01-27 PROCEDURE — 85025 COMPLETE CBC W/AUTO DIFF WBC: CPT

## 2020-01-27 PROCEDURE — 99214 OFFICE O/P EST MOD 30 MIN: CPT | Performed by: PSYCHIATRY & NEUROLOGY

## 2020-01-27 PROCEDURE — 80053 COMPREHEN METABOLIC PANEL: CPT

## 2020-01-27 RX ORDER — BUPROPION HYDROCHLORIDE 150 MG/1
150 TABLET ORAL EVERY MORNING
Qty: 30 TABLET | Refills: 6 | Status: SHIPPED | OUTPATIENT
Start: 2020-01-27 | End: 2020-07-31 | Stop reason: SDUPTHER

## 2020-01-27 NOTE — PROGRESS NOTES
Subjective:   Chief Complaint   Patient presents with   • Multiple Sclerosis     6 month follow up    • Essential Tremor     6 month follow up       Patient ID: Nicky Owens is a 64 y.o. female.    History of Present Illness     64 y.o. woman with RRMS and essential tremors returns in follow up.  Last visit on 7/29/19 continued Aubagio, Toviaz and  Lexapro.     MRI Brain, 3/28/19 as compared to 12/26/17,  no new, enlarging or enhancing lesions       12/6/19 Hep C - neg, Vit D 47.3, CMP, TSH NCS      MDD    Vivid dreams and sedation in mornings.   Mood improved on Lexapro but dislikes side effects.      RRMS    Fatigue is mild.     No further tremors.      Tolerating Aubagio without side effects. Currently out of medication due to insurance issues.       Essential Tremor    Tremor controlled    Urinary urgency     Toviaz controlling frequency and urgrency but slight worsening with stress.        The following portions of the patient's history were reviewed and updated as appropriate: allergies, current medications, past medical history, past surgical history and problem list.    Review of Systems   Constitutional: Negative for activity change and unexpected weight change.   HENT: Negative for tinnitus and trouble swallowing.    Eyes: Negative for photophobia and visual disturbance.   Respiratory: Negative for apnea and choking.    Cardiovascular: Negative for leg swelling.   Endocrine: Positive for heat intolerance. Negative for cold intolerance.   Genitourinary: Positive for frequency and urgency. Negative for difficulty urinating and menstrual problem.   Musculoskeletal: Negative for back pain and gait problem.   Skin: Negative for color change.   Allergic/Immunologic: Negative for immunocompromised state.   Neurological: Positive for tremors. Negative for dizziness, seizures, syncope, facial asymmetry, speech difficulty and light-headedness.   Hematological: Negative for adenopathy. Does not bruise/bleed  "easily.   Psychiatric/Behavioral: Positive for dysphoric mood. Negative for behavioral problems, confusion, decreased concentration, hallucinations and sleep disturbance. The patient is nervous/anxious.         Objective:  Vitals:    01/27/20 1138   BP: 118/68   Pulse: 69   SpO2: 98%   Weight: 64 kg (141 lb 3.2 oz)   Height: 157.5 cm (62\")       Neurologic Exam     Mental Status   Oriented to person, place, and time.   Registration: recalls 3 of 3 objects. Recall at 5 minutes: recalls 3 of 3 objects.   Attention: normal. Concentration: normal.   Speech: speech is normal   Level of consciousness: alert  Knowledge: good and consistent with education.   Normal comprehension.     Cranial Nerves     CN II   Visual fields full to confrontation.   Visual acuity: normal  Right visual field deficit: none  Left visual field deficit: none     CN III, IV, VI   Nystagmus: none   Diplopia: none  Ophthalmoparesis: none  Upgaze: normal  Downgaze: normal  Conjugate gaze: present    CN V   Facial sensation intact.   Right corneal reflex: normal  Left corneal reflex: normal    CN VII   Right facial weakness: none  Left facial weakness: none    CN VIII   Hearing: intact    CN IX, X   Palate: symmetric  Right gag reflex: normal  Left gag reflex: normal    CN XI   Right sternocleidomastoid strength: normal  Left sternocleidomastoid strength: normal    CN XII   Tongue: not atrophic  Fasciculations: absent  Tongue deviation: none    Motor Exam   Muscle bulk: normal  Overall muscle tone: normal  Right arm tone: normal  Left arm tone: normal  Right leg tone: normal  Left leg tone: normal    Sensory Exam   Light touch normal.     Gait, Coordination, and Reflexes     Tremor   Resting tremor: absent  Intention tremor: absent  Action tremor: absent    Reflexes   Reflexes 2+ except as noted.       Physical Exam   Constitutional: She is oriented to person, place, and time.   Neurological: She is oriented to person, place, and time.   Psychiatric: " Her speech is normal.       Assessment/Plan:       Problems Addressed this Visit        Nervous and Auditory    Essential tremor - Primary     Sx are stable          Multiple sclerosis (CMS/HCC)     No new or worsening sx on Aubagio    CBC,CMP                  Genitourinary    Urinary urgency     Sx stable on Toviaz            Other    Major depressive disorder with current active episode     Trouble tolerating Lexapro    Switch to Wellbutrin  mg qam          Relevant Medications    buPROPion XL (WELLBUTRIN XL) 150 MG 24 hr tablet

## 2020-02-24 RX ORDER — ERGOCALCIFEROL 1.25 MG/1
CAPSULE ORAL
Qty: 4 CAPSULE | Refills: 6 | Status: SHIPPED | OUTPATIENT
Start: 2020-02-24 | End: 2021-01-04 | Stop reason: SDUPTHER

## 2020-06-12 ENCOUNTER — LAB (OUTPATIENT)
Dept: LAB | Facility: HOSPITAL | Age: 65
End: 2020-06-12

## 2020-06-12 ENCOUNTER — OFFICE VISIT (OUTPATIENT)
Dept: ENDOCRINOLOGY | Facility: CLINIC | Age: 65
End: 2020-06-12

## 2020-06-12 VITALS
HEIGHT: 62 IN | BODY MASS INDEX: 26.24 KG/M2 | HEART RATE: 67 BPM | DIASTOLIC BLOOD PRESSURE: 80 MMHG | OXYGEN SATURATION: 99 % | WEIGHT: 142.6 LBS | SYSTOLIC BLOOD PRESSURE: 124 MMHG

## 2020-06-12 DIAGNOSIS — E03.9 ACQUIRED HYPOTHYROIDISM: ICD-10-CM

## 2020-06-12 DIAGNOSIS — E55.9 AVITAMINOSIS D: ICD-10-CM

## 2020-06-12 DIAGNOSIS — E78.2 MIXED HYPERLIPIDEMIA: ICD-10-CM

## 2020-06-12 DIAGNOSIS — I10 BENIGN ESSENTIAL HYPERTENSION: Primary | ICD-10-CM

## 2020-06-12 LAB
ALBUMIN SERPL-MCNC: 4.1 G/DL (ref 3.5–5.2)
ALBUMIN/GLOB SERPL: 1.4 G/DL
ALP SERPL-CCNC: 65 U/L (ref 39–117)
ALT SERPL W P-5'-P-CCNC: 23 U/L (ref 1–33)
ANION GAP SERPL CALCULATED.3IONS-SCNC: 9.5 MMOL/L (ref 5–15)
AST SERPL-CCNC: 22 U/L (ref 1–32)
BILIRUB SERPL-MCNC: 0.3 MG/DL (ref 0.2–1.2)
BUN BLD-MCNC: 12 MG/DL (ref 8–23)
BUN/CREAT SERPL: 15.8 (ref 7–25)
CALCIUM SPEC-SCNC: 9 MG/DL (ref 8.6–10.5)
CHLORIDE SERPL-SCNC: 105 MMOL/L (ref 98–107)
CHOLEST SERPL-MCNC: 238 MG/DL (ref 0–200)
CO2 SERPL-SCNC: 26.5 MMOL/L (ref 22–29)
CREAT BLD-MCNC: 0.76 MG/DL (ref 0.57–1)
GFR SERPL CREATININE-BSD FRML MDRD: 76 ML/MIN/1.73
GLOBULIN UR ELPH-MCNC: 3 GM/DL
GLUCOSE BLD-MCNC: 88 MG/DL (ref 65–99)
HDLC SERPL-MCNC: 39 MG/DL (ref 40–60)
LDLC SERPL CALC-MCNC: 176 MG/DL (ref 0–100)
LDLC/HDLC SERPL: 4.52 {RATIO}
POTASSIUM BLD-SCNC: 4.3 MMOL/L (ref 3.5–5.2)
PROT SERPL-MCNC: 7.1 G/DL (ref 6–8.5)
SODIUM BLD-SCNC: 141 MMOL/L (ref 136–145)
T4 FREE SERPL-MCNC: 1.5 NG/DL (ref 0.93–1.7)
TRIGL SERPL-MCNC: 114 MG/DL (ref 0–150)
VLDLC SERPL-MCNC: 22.8 MG/DL (ref 5–40)

## 2020-06-12 PROCEDURE — 99214 OFFICE O/P EST MOD 30 MIN: CPT | Performed by: INTERNAL MEDICINE

## 2020-06-12 PROCEDURE — 84443 ASSAY THYROID STIM HORMONE: CPT | Performed by: INTERNAL MEDICINE

## 2020-06-12 PROCEDURE — 80053 COMPREHEN METABOLIC PANEL: CPT | Performed by: INTERNAL MEDICINE

## 2020-06-12 PROCEDURE — 82306 VITAMIN D 25 HYDROXY: CPT | Performed by: INTERNAL MEDICINE

## 2020-06-12 PROCEDURE — 80061 LIPID PANEL: CPT | Performed by: INTERNAL MEDICINE

## 2020-06-12 PROCEDURE — 84439 ASSAY OF FREE THYROXINE: CPT | Performed by: INTERNAL MEDICINE

## 2020-06-12 NOTE — PROGRESS NOTES
Nicky Owens 64 y.o.  CC:Follow-up; Hypertension; Hypothyroidism; Hyperlipidemia; and Multiple Sclerosis      Chief Complaint   Patient presents with   • Follow-up   • Hypertension   • Hypothyroidism   • Hyperlipidemia   • Multiple Sclerosis       [place]: Follow-up; Hypertension; Hypothyroidism; Hyperlipidemia; and Multiple Sclerosis    bp is good today  Is on low fat diet - is not taking statin currently   Is on ace inhibitor   MS stable- recent cmp and cbc via Dr Donovan reviewed  Is on synthroid 88 mcg daily- no missed doses or problems with dosing     Allergies   Allergen Reactions   • Penicillins        Current Outpatient Medications:   •  buPROPion XL (WELLBUTRIN XL) 150 MG 24 hr tablet, Take 1 tablet by mouth Every Morning., Disp: 30 tablet, Rfl: 6  •  estradiol (MINIVELLE, VIVELLE-DOT) 0.05 MG/24HR patch, Place 1 patch on the skin 1 (One) Time Per Week., Disp: , Rfl:   •  lisinopril (PRINIVIL,ZESTRIL) 10 MG tablet, Take 1 tablet by mouth Daily., Disp: 30 tablet, Rfl: 5  •  simvastatin (ZOCOR) 20 MG tablet, TAKE ONE TABLET BY MOUTH DAILY, Disp: 30 tablet, Rfl: 5  •  SYNTHROID 88 MCG tablet, TAKE ONE TABLET BY MOUTH DAILY, Disp: 30 tablet, Rfl: 5  •  Teriflunomide (AUBAGIO) 14 MG tablet, Take 14 mg by mouth Daily., Disp: 30 tablet, Rfl: 11  •  TOVIAZ 4 MG tablet sustained-release 24 hour tablet, , Disp: , Rfl:   •  vitamin D (ERGOCALCIFEROL) 1.25 MG (68528 UT) capsule capsule, TAKE ONE CAPSULE BY MOUTH EVERY 7 DAYS, Disp: 4 capsule, Rfl: 6  Patient Active Problem List    Diagnosis   • Major depressive disorder with current active episode [F32.9]   • Urinary urgency [R39.15]   • Bruising [T14.8XXA]   • Acute gastritis [K29.00]   • Abdominal pain RUQ [R10.9]   • Visit for screening mammogram [Z12.31]   • Anemia [D64.9]   • Anorexia [R63.0]   • Anxiety [F41.9]   • Benign essential hypertension [I10]   • Bone cyst [M85.60]   • H/O colonoscopy [Z98.890]   • Degeneration of intervertebral disc of lumbar region  [M51.36]   • Dysphagia [R13.10]   • Erosive esophagitis [K22.10]   • Essential tremor [G25.0]   • Hyperlipidemia [E78.5]   • Hyperreflexia [R29.2]   • Hypothyroidism [E03.9]   • Multiple sclerosis (CMS/HCC) [G35]   • Heart murmur [R01.1]   • Muscle weakness [M62.81]   • Nausea [R11.0]   • Nonallopathic lesion of cervical region [M99.9]   • Osteoporosis [M81.0]   • Shoulder pain [M25.519]   • Weakness [R53.1]   • Avitaminosis D [E55.9]   • Cobalamin deficiency [E53.8]   • Tremor [R25.1]     Review of Systems   Constitutional: Negative for activity change, appetite change, chills, diaphoresis, fatigue, fever and unexpected weight change.   HENT: Negative for congestion, dental problem, drooling, ear discharge, ear pain, facial swelling, hearing loss, mouth sores, nosebleeds, postnasal drip, rhinorrhea, sinus pressure, sneezing, sore throat, tinnitus, trouble swallowing and voice change.    Eyes: Negative for photophobia, pain, discharge, redness, itching and visual disturbance.   Respiratory: Negative for apnea, cough, choking, chest tightness, shortness of breath, wheezing and stridor.    Cardiovascular: Negative for chest pain, palpitations and leg swelling.   Gastrointestinal: Negative for abdominal distention, abdominal pain, anal bleeding, blood in stool, constipation, diarrhea, nausea, rectal pain and vomiting.   Endocrine: Negative for cold intolerance, heat intolerance, polydipsia, polyphagia and polyuria.   Genitourinary: Negative for decreased urine volume, difficulty urinating, dysuria, enuresis, flank pain, frequency, genital sores, hematuria and urgency.   Musculoskeletal: Negative for arthralgias, back pain, gait problem, joint swelling, myalgias, neck pain and neck stiffness.   Skin: Negative for color change, pallor, rash and wound.   Allergic/Immunologic: Negative for environmental allergies, food allergies and immunocompromised state.   Neurological: Positive for weakness. Negative for dizziness,  "tremors, seizures, syncope, facial asymmetry, speech difficulty, light-headedness, numbness and headaches.   Hematological: Negative for adenopathy. Does not bruise/bleed easily.   Psychiatric/Behavioral: Negative for agitation, behavioral problems, confusion, decreased concentration, dysphoric mood, hallucinations, self-injury, sleep disturbance and suicidal ideas. The patient is not nervous/anxious and is not hyperactive.      Social History     Socioeconomic History   • Marital status:      Spouse name: Not on file   • Number of children: Not on file   • Years of education: Not on file   • Highest education level: Not on file   Tobacco Use   • Smoking status: Never Smoker   • Smokeless tobacco: Never Used   Substance and Sexual Activity   • Alcohol use: No   • Drug use: No   • Sexual activity: Defer     Family History   Problem Relation Age of Onset   • Heart disease Mother    • Hypertension Mother    • Heart disease Father    • Hypertension Father    • Hypertension Sister    • Other Sister         malignant neoplasm   • Mental illness Sister    • Stroke Brother    • Hypertension Brother    • Mental illness Brother    • Alzheimer's disease Other         Aunt     /80   Pulse 67   Ht 157.5 cm (62\")   Wt 64.7 kg (142 lb 9.6 oz)   SpO2 99%   BMI 26.08 kg/m²   Physical Exam   Constitutional: She is oriented to person, place, and time. She appears well-developed and well-nourished.   HENT:   Head: Normocephalic and atraumatic.   Nose: Nose normal.   Mouth/Throat: Oropharynx is clear and moist.   Eyes: Pupils are equal, round, and reactive to light. Conjunctivae, EOM and lids are normal.   Neck: Trachea normal and normal range of motion. Neck supple. Carotid bruit is not present. No tracheal deviation present. No thyroid mass and no thyromegaly present.   Cardiovascular: Normal rate, regular rhythm, normal heart sounds and intact distal pulses. Exam reveals no gallop and no friction rub.   No murmur " heard.  Pulmonary/Chest: Effort normal and breath sounds normal. No respiratory distress. She has no wheezes.   Musculoskeletal: Normal range of motion. She exhibits no edema or deformity.   Lymphadenopathy:     She has no cervical adenopathy.   Neurological: She is alert and oriented to person, place, and time. She has normal reflexes. She displays normal reflexes. No cranial nerve deficit.   Skin: Skin is warm and dry. No rash noted. No cyanosis or erythema. Nails show no clubbing.   Psychiatric: She has a normal mood and affect. Her speech is normal and behavior is normal. Judgment and thought content normal. Cognition and memory are normal.   Nursing note and vitals reviewed.    Results for orders placed or performed in visit on 01/27/20   Comprehensive Metabolic Panel   Result Value Ref Range    Glucose 83 65 - 99 mg/dL    BUN 12 8 - 23 mg/dL    Creatinine 0.66 0.57 - 1.00 mg/dL    Sodium 143 136 - 145 mmol/L    Potassium 4.4 3.5 - 5.2 mmol/L    Chloride 108 (H) 98 - 107 mmol/L    CO2 23.7 22.0 - 29.0 mmol/L    Calcium 8.9 8.6 - 10.5 mg/dL    Total Protein 6.6 6.0 - 8.5 g/dL    Albumin 4.00 3.50 - 5.20 g/dL    ALT (SGPT) 13 1 - 33 U/L    AST (SGOT) 18 1 - 32 U/L    Alkaline Phosphatase 57 39 - 117 U/L    Total Bilirubin 0.3 0.2 - 1.2 mg/dL    eGFR Non African Amer 90 >60 mL/min/1.73    Globulin 2.6 gm/dL    A/G Ratio 1.5 g/dL    BUN/Creatinine Ratio 18.2 7.0 - 25.0    Anion Gap 11.3 5.0 - 15.0 mmol/L   CBC Auto Differential   Result Value Ref Range    WBC 4.35 3.40 - 10.80 10*3/mm3    RBC 4.16 3.77 - 5.28 10*6/mm3    Hemoglobin 12.4 12.0 - 15.9 g/dL    Hematocrit 37.3 34.0 - 46.6 %    MCV 89.7 79.0 - 97.0 fL    MCH 29.8 26.6 - 33.0 pg    MCHC 33.2 31.5 - 35.7 g/dL    RDW 12.5 12.3 - 15.4 %    RDW-SD 40.4 37.0 - 54.0 fl    MPV 11.7 6.0 - 12.0 fL    Platelets 182 140 - 450 10*3/mm3    Neutrophil % 54.1 42.7 - 76.0 %    Lymphocyte % 27.6 19.6 - 45.3 %    Monocyte % 14.9 (H) 5.0 - 12.0 %    Eosinophil % 2.1 0.3 -  6.2 %    Basophil % 1.1 0.0 - 1.5 %    Immature Grans % 0.2 0.0 - 0.5 %    Neutrophils, Absolute 2.35 1.70 - 7.00 10*3/mm3    Lymphocytes, Absolute 1.20 0.70 - 3.10 10*3/mm3    Monocytes, Absolute 0.65 0.10 - 0.90 10*3/mm3    Eosinophils, Absolute 0.09 0.00 - 0.40 10*3/mm3    Basophils, Absolute 0.05 0.00 - 0.20 10*3/mm3    Immature Grans, Absolute 0.01 0.00 - 0.05 10*3/mm3    nRBC 0.2 0.0 - 0.2 /100 WBC     Problem List Items Addressed This Visit        Cardiovascular and Mediastinum    Hyperlipidemia     Is on no statin currently (was on zocor)  Check flp          Relevant Orders    Lipid Panel    Benign essential hypertension - Primary     bp is well controlled  Continue current medication and monitoring   Kidney function stable by cmp          Relevant Orders    Comprehensive Metabolic Panel       Digestive    Avitaminosis D     Is on supplement- 50,000 u weekly          Relevant Orders    Vitamin D 25 Hydroxy       Endocrine    Hypothyroidism     Is on synthroid 88 mcg daily   Check tfts   Continue medication          Relevant Orders    TSH    T4, Free        Return in about 6 months (around 12/12/2020) for Recheck.    Jennifer Rubi MA  Signed Tierney Mata MD

## 2020-06-12 NOTE — ASSESSMENT & PLAN NOTE
bp is well controlled  Continue current medication and monitoring   Kidney function stable by cmp

## 2020-06-13 LAB
25(OH)D3 SERPL-MCNC: 40.3 NG/ML (ref 30–100)
TSH SERPL DL<=0.05 MIU/L-ACNC: 0.97 UIU/ML (ref 0.27–4.2)

## 2020-06-14 RX ORDER — SIMVASTATIN 20 MG
20 TABLET ORAL DAILY
Qty: 30 TABLET | Refills: 6 | Status: SHIPPED | OUTPATIENT
Start: 2020-06-14 | End: 2021-02-22

## 2020-06-24 RX ORDER — LEVOTHYROXINE SODIUM 88 MCG
TABLET ORAL
Qty: 30 TABLET | Refills: 4 | Status: SHIPPED | OUTPATIENT
Start: 2020-06-24 | End: 2020-11-27

## 2020-07-23 RX ORDER — RENAGEL 800 MG/1
TABLET ORAL
Qty: 30 TABLET | Refills: 11 | Status: SHIPPED | OUTPATIENT
Start: 2020-07-23 | End: 2021-07-27 | Stop reason: SDUPTHER

## 2020-07-31 ENCOUNTER — OFFICE VISIT (OUTPATIENT)
Dept: NEUROLOGY | Facility: CLINIC | Age: 65
End: 2020-07-31

## 2020-07-31 VITALS
BODY MASS INDEX: 25.76 KG/M2 | OXYGEN SATURATION: 99 % | DIASTOLIC BLOOD PRESSURE: 72 MMHG | HEIGHT: 62 IN | HEART RATE: 80 BPM | RESPIRATION RATE: 16 BRPM | WEIGHT: 140 LBS | SYSTOLIC BLOOD PRESSURE: 122 MMHG

## 2020-07-31 DIAGNOSIS — G35 MULTIPLE SCLEROSIS (HCC): Primary | ICD-10-CM

## 2020-07-31 DIAGNOSIS — R39.15 URINARY URGENCY: ICD-10-CM

## 2020-07-31 DIAGNOSIS — G25.0 ESSENTIAL TREMOR: ICD-10-CM

## 2020-07-31 DIAGNOSIS — F32.9 MAJOR DEPRESSIVE DISORDER WITH CURRENT ACTIVE EPISODE, UNSPECIFIED DEPRESSION EPISODE SEVERITY, UNSPECIFIED WHETHER RECURRENT: ICD-10-CM

## 2020-07-31 DIAGNOSIS — R25.1 TREMOR: ICD-10-CM

## 2020-07-31 PROCEDURE — 99214 OFFICE O/P EST MOD 30 MIN: CPT | Performed by: PSYCHIATRY & NEUROLOGY

## 2020-07-31 RX ORDER — BUPROPION HYDROCHLORIDE 150 MG/1
300 TABLET ORAL EVERY MORNING
Qty: 60 TABLET | Refills: 6 | Status: SHIPPED | OUTPATIENT
Start: 2020-07-31 | End: 2020-09-04 | Stop reason: SDUPTHER

## 2020-07-31 NOTE — PROGRESS NOTES
Subjective:   Chief Complaint   Patient presents with   • Tremors   • Multiple Sclerosis       Patient ID: Nicky Owens is a 64 y.o. female.    History of Present Illness     64 y.o. woman with RRMS and essential tremors returns in follow up.  Last visit on 1/27/20 continued Aubagio, Toviaz, d/c Lexapro, ordered labs, started Wellbutrin.     MRI Brain, 3/28/19 as compared to 12/26/17,  no new, enlarging or enhancing lesions       6/12/20  CMP, TSH NCS      MDD    Dreams are less vivid and no longer tired in am.     Mood slight improvement on Buproprion.     RRMS    Fatigue is minimal.      No further tremors.      Tolerating Aubagio without side effects.        Essential Tremor    Tremor controlled    Urinary urgency     Toviaz controlling frequency and urgrency but slight worsening with stress.        The following portions of the patient's history were reviewed and updated as appropriate: allergies, current medications, past medical history, past surgical history and problem list.    Review of Systems   Constitutional: Negative for activity change and unexpected weight change.   HENT: Negative for tinnitus and trouble swallowing.    Eyes: Negative for photophobia and visual disturbance.   Respiratory: Negative for apnea and choking.    Cardiovascular: Negative for leg swelling.   Endocrine: Positive for heat intolerance. Negative for cold intolerance.   Genitourinary: Positive for frequency and urgency. Negative for difficulty urinating and menstrual problem.   Musculoskeletal: Negative for back pain and gait problem.   Skin: Negative for color change.   Allergic/Immunologic: Negative for immunocompromised state.   Neurological: Positive for tremors. Negative for dizziness, seizures, syncope, facial asymmetry, speech difficulty and light-headedness.   Hematological: Negative for adenopathy. Does not bruise/bleed easily.   Psychiatric/Behavioral: Positive for dysphoric mood. Negative for behavioral problems,  "confusion, decreased concentration, hallucinations and sleep disturbance. The patient is nervous/anxious.         Objective:  Vitals:    07/31/20 1259   BP: 122/72   Pulse: 80   Resp: 16   SpO2: 99%   Weight: 63.5 kg (140 lb)   Height: 157.5 cm (62\")       Neurologic Exam     Mental Status   Oriented to person, place, and time.   Registration: recalls 3 of 3 objects. Recall at 5 minutes: recalls 3 of 3 objects.   Attention: normal. Concentration: normal.   Speech: speech is normal   Level of consciousness: alert  Knowledge: good and consistent with education.   Normal comprehension.     Cranial Nerves     CN II   Visual fields full to confrontation.   Visual acuity: normal  Right visual field deficit: none  Left visual field deficit: none     CN III, IV, VI   Nystagmus: none   Diplopia: none  Ophthalmoparesis: none  Upgaze: normal  Downgaze: normal  Conjugate gaze: present    CN V   Facial sensation intact.   Right corneal reflex: normal  Left corneal reflex: normal    CN VII   Right facial weakness: none  Left facial weakness: none    CN VIII   Hearing: intact    CN IX, X   Palate: symmetric  Right gag reflex: normal  Left gag reflex: normal    CN XI   Right sternocleidomastoid strength: normal  Left sternocleidomastoid strength: normal    CN XII   Tongue: not atrophic  Fasciculations: absent  Tongue deviation: none    Motor Exam   Muscle bulk: normal  Overall muscle tone: normal  Right arm tone: normal  Left arm tone: normal  Right leg tone: normal  Left leg tone: normal    Sensory Exam   Light touch normal.     Gait, Coordination, and Reflexes     Tremor   Resting tremor: absent  Intention tremor: absent  Action tremor: absent    Reflexes   Reflexes 2+ except as noted.       Physical Exam   Constitutional: She is oriented to person, place, and time.   Neurological: She is oriented to person, place, and time.   Psychiatric: Her speech is normal.       Assessment/Plan:       Problems Addressed this Visit        " Nervous and Auditory    Essential tremor    Multiple sclerosis (CMS/HCC) - Primary     No new or worsening sx     Continue Aubagio     Labs stable          Relevant Orders    MRI Brain With & Without Contrast    MRI Cervical Spine With & Without Contrast       Genitourinary    Urinary urgency     Improved on Toviaz             Other    Tremor     Stable off meds         Major depressive disorder with current active episode     Psychological condition is improving with treatment.  Regular aerobic exercise.  Medication changes per orders.  Psychological condition  will be reassessed at the next regular appointment.         Relevant Medications    buPROPion XL (Wellbutrin XL) 150 MG 24 hr tablet

## 2020-07-31 NOTE — ASSESSMENT & PLAN NOTE
Psychological condition is improving with treatment.  Regular aerobic exercise.  Medication changes per orders.  Psychological condition  will be reassessed at the next regular appointment.

## 2020-08-19 ENCOUNTER — APPOINTMENT (OUTPATIENT)
Dept: MRI IMAGING | Facility: HOSPITAL | Age: 65
End: 2020-08-19

## 2020-08-28 ENCOUNTER — HOSPITAL ENCOUNTER (OUTPATIENT)
Dept: MRI IMAGING | Facility: HOSPITAL | Age: 65
Discharge: HOME | End: 2020-08-28

## 2020-08-28 ENCOUNTER — HOSPITAL ENCOUNTER (OUTPATIENT)
Dept: MRI IMAGING | Facility: HOSPITAL | Age: 65
Discharge: HOME | End: 2020-08-28
Admitting: PSYCHIATRY & NEUROLOGY

## 2020-08-28 DIAGNOSIS — G35 MULTIPLE SCLEROSIS (HCC): ICD-10-CM

## 2020-08-28 PROCEDURE — 72156 MRI NECK SPINE W/O & W/DYE: CPT

## 2020-08-28 PROCEDURE — A9577 INJ MULTIHANCE: HCPCS | Performed by: PSYCHIATRY & NEUROLOGY

## 2020-08-28 PROCEDURE — 70553 MRI BRAIN STEM W/O & W/DYE: CPT

## 2020-08-28 PROCEDURE — 0 GADOBENATE DIMEGLUMINE 529 MG/ML SOLUTION: Performed by: PSYCHIATRY & NEUROLOGY

## 2020-08-28 RX ADMIN — GADOBENATE DIMEGLUMINE 13 ML: 529 INJECTION, SOLUTION INTRAVENOUS at 12:15

## 2020-09-04 ENCOUNTER — OFFICE VISIT (OUTPATIENT)
Dept: NEUROLOGY | Facility: CLINIC | Age: 65
End: 2020-09-04

## 2020-09-04 VITALS
BODY MASS INDEX: 25.76 KG/M2 | SYSTOLIC BLOOD PRESSURE: 124 MMHG | TEMPERATURE: 97.5 F | HEART RATE: 76 BPM | HEIGHT: 62 IN | DIASTOLIC BLOOD PRESSURE: 78 MMHG | OXYGEN SATURATION: 98 % | WEIGHT: 140 LBS

## 2020-09-04 DIAGNOSIS — R25.1 TREMOR: ICD-10-CM

## 2020-09-04 DIAGNOSIS — R39.15 URINARY URGENCY: ICD-10-CM

## 2020-09-04 DIAGNOSIS — G35 MULTIPLE SCLEROSIS (HCC): Primary | ICD-10-CM

## 2020-09-04 DIAGNOSIS — G25.0 ESSENTIAL TREMOR: ICD-10-CM

## 2020-09-04 DIAGNOSIS — F32.9 MAJOR DEPRESSIVE DISORDER WITH CURRENT ACTIVE EPISODE, UNSPECIFIED DEPRESSION EPISODE SEVERITY, UNSPECIFIED WHETHER RECURRENT: ICD-10-CM

## 2020-09-04 PROCEDURE — 99214 OFFICE O/P EST MOD 30 MIN: CPT | Performed by: PSYCHIATRY & NEUROLOGY

## 2020-09-04 RX ORDER — BUPROPION HYDROCHLORIDE 150 MG/1
150 TABLET ORAL EVERY MORNING
Qty: 30 TABLET | Refills: 11 | Status: SHIPPED | OUTPATIENT
Start: 2020-09-04 | End: 2021-04-22

## 2020-09-04 NOTE — ASSESSMENT & PLAN NOTE
No new or worsening on Aubagio    MRI Brain/cervical no new or worsening sx     Continue Aubagio

## 2020-09-04 NOTE — PROGRESS NOTES
Subjective:   Chief Complaint   Patient presents with   • Multiple Sclerosis     Post MRI        Patient ID: Nicky Owens is a 64 y.o. female.    History of Present Illness     64 y.o. woman with RRMS and essential tremors returns in follow up.  Last visit on 7/31/20 continued Aubagio, Toviaz, Wellbutrin.     MRI Brain, my review of films, 8/28/20 as compared to 3/28/19 as compared to 12/26/17,  Mild T2 lesion load, no new, enlarging or enhancing lesions       6/12/20  CMP, TSH NCS      MDD    Increased Wellbutrin 300 but could not tolerate due to shakiness.     Decreased Wellbutrin 150 mg qam and mood is stable.      RRMS    No new or worsening sx.      Fatigue is minimal.      No further tremors.      Tolerating Aubagio without side effects.        Essential Tremor    Tremor controlled    Urinary urgency     Toviaz improving frequency and urgrency       The following portions of the patient's history were reviewed and updated as appropriate: allergies, current medications, past medical history, past surgical history and problem list.    Review of Systems   Constitutional: Negative for activity change and unexpected weight change.   HENT: Negative for tinnitus and trouble swallowing.    Eyes: Negative for photophobia and visual disturbance.   Respiratory: Negative for apnea and choking.    Cardiovascular: Negative for leg swelling.   Endocrine: Positive for heat intolerance. Negative for cold intolerance.   Genitourinary: Positive for frequency and urgency. Negative for difficulty urinating and menstrual problem.   Musculoskeletal: Negative for back pain and gait problem.   Skin: Negative for color change.   Allergic/Immunologic: Negative for immunocompromised state.   Neurological: Positive for tremors. Negative for dizziness, seizures, syncope, facial asymmetry, speech difficulty and light-headedness.   Hematological: Negative for adenopathy. Does not bruise/bleed easily.   Psychiatric/Behavioral:  "Positive for dysphoric mood. Negative for behavioral problems, confusion, decreased concentration, hallucinations and sleep disturbance. The patient is nervous/anxious.         Objective:  Vitals:    09/04/20 1258   BP: 124/78   Pulse: 76   Temp: 97.5 °F (36.4 °C)   SpO2: 98%   Weight: 63.5 kg (140 lb)   Height: 157.5 cm (62\")       Neurologic Exam     Mental Status   Oriented to person, place, and time.   Registration: recalls 3 of 3 objects. Recall at 5 minutes: recalls 3 of 3 objects.   Attention: normal. Concentration: normal.   Speech: speech is normal   Level of consciousness: alert  Knowledge: good and consistent with education.   Normal comprehension.     Cranial Nerves     CN II   Visual fields full to confrontation.   Visual acuity: normal  Right visual field deficit: none  Left visual field deficit: none     CN III, IV, VI   Nystagmus: none   Diplopia: none  Ophthalmoparesis: none  Upgaze: normal  Downgaze: normal  Conjugate gaze: present    CN V   Facial sensation intact.   Right corneal reflex: normal  Left corneal reflex: normal    CN VII   Right facial weakness: none  Left facial weakness: none    CN VIII   Hearing: intact    CN IX, X   Palate: symmetric  Right gag reflex: normal  Left gag reflex: normal    CN XI   Right sternocleidomastoid strength: normal  Left sternocleidomastoid strength: normal    CN XII   Tongue: not atrophic  Fasciculations: absent  Tongue deviation: none    Motor Exam   Muscle bulk: normal  Overall muscle tone: normal  Right arm tone: normal  Left arm tone: normal  Right leg tone: normal  Left leg tone: normal    Sensory Exam   Light touch normal.     Gait, Coordination, and Reflexes     Tremor   Resting tremor: absent  Intention tremor: absent  Action tremor: absent    Reflexes   Reflexes 2+ except as noted.       Physical Exam   Constitutional: She is oriented to person, place, and time.   Neurological: She is oriented to person, place, and time.   Psychiatric: Her speech is " normal.       Assessment/Plan:       Problems Addressed this Visit        Nervous and Auditory    Essential tremor     Sx stable          Multiple sclerosis (CMS/HCC) - Primary     No new or worsening on Aubagio    MRI Brain/cervical no new or worsening sx     Continue Aubagio                     Genitourinary    Urinary urgency     Sx stable on Toviaz            Other    Tremor    Major depressive disorder with current active episode     Psychological condition is unchanged.  Continue current treatment regimen.  Psychological condition  will be reassessed at the next regular appointment.         Relevant Medications    buPROPion XL (Wellbutrin XL) 150 MG 24 hr tablet

## 2020-10-27 RX ORDER — LISINOPRIL 10 MG/1
TABLET ORAL
Qty: 30 TABLET | Refills: 4 | Status: SHIPPED | OUTPATIENT
Start: 2020-10-27 | End: 2021-03-24

## 2020-10-27 NOTE — TELEPHONE ENCOUNTER
Dr. Mata, refill request.  Thank you.    LOV:  06/12/20  Future visit:  12/14/20    Last refill:  12/23/19  Q.  30  R.  5  Sig:  Take 1 tablet by mouth Daily.    [facility] pharmacy.

## 2020-11-27 RX ORDER — LEVOTHYROXINE SODIUM 88 MCG
TABLET ORAL
Qty: 30 TABLET | Refills: 3 | Status: SHIPPED | OUTPATIENT
Start: 2020-11-27 | End: 2021-03-24

## 2020-12-09 ENCOUNTER — SPECIALTY PHARMACY (OUTPATIENT)
Dept: ONCOLOGY | Facility: HOSPITAL | Age: 65
End: 2020-12-09

## 2020-12-09 DIAGNOSIS — G35 MULTIPLE SCLEROSIS (HCC): Primary | ICD-10-CM

## 2020-12-14 ENCOUNTER — TELEPHONE (OUTPATIENT)
Dept: NEUROLOGY | Facility: CLINIC | Age: 65
End: 2020-12-14

## 2020-12-14 NOTE — TELEPHONE ENCOUNTER
Let patient know she is ok to wait until 1/4/2021 to get labs drawn. Patient verbalized understanding.

## 2020-12-14 NOTE — TELEPHONE ENCOUNTER
Patient not able to get labs drawn due to being on quarantine. Do you want me to just let her know we can give her samples or will she be fine until 1/4/2021?

## 2020-12-14 NOTE — TELEPHONE ENCOUNTER
Pt called in stating she is in quarantine and is unable to get her blood work done with dr childress office today. States she doesn't know how soon she needed this to be done for the Aubagio. She rescheduled for January 4th but doesn't know if that is soon enough and doesn't want any problems getting her aubagio.     Please call patient

## 2021-01-04 ENCOUNTER — OFFICE VISIT (OUTPATIENT)
Dept: ENDOCRINOLOGY | Facility: CLINIC | Age: 66
End: 2021-01-04

## 2021-01-04 ENCOUNTER — LAB (OUTPATIENT)
Dept: LAB | Facility: HOSPITAL | Age: 66
End: 2021-01-04

## 2021-01-04 VITALS
OXYGEN SATURATION: 98 % | SYSTOLIC BLOOD PRESSURE: 144 MMHG | WEIGHT: 146.8 LBS | HEIGHT: 62 IN | BODY MASS INDEX: 27.02 KG/M2 | HEART RATE: 77 BPM | DIASTOLIC BLOOD PRESSURE: 80 MMHG

## 2021-01-04 DIAGNOSIS — E78.2 MIXED HYPERLIPIDEMIA: ICD-10-CM

## 2021-01-04 DIAGNOSIS — E03.9 ACQUIRED HYPOTHYROIDISM: Primary | ICD-10-CM

## 2021-01-04 DIAGNOSIS — G35 MULTIPLE SCLEROSIS (HCC): ICD-10-CM

## 2021-01-04 DIAGNOSIS — E55.9 AVITAMINOSIS D: ICD-10-CM

## 2021-01-04 DIAGNOSIS — I10 BENIGN ESSENTIAL HYPERTENSION: ICD-10-CM

## 2021-01-04 LAB
25(OH)D3 SERPL-MCNC: 58.8 NG/ML (ref 30–100)
ALBUMIN SERPL-MCNC: 4.2 G/DL (ref 3.5–5.2)
ALBUMIN/GLOB SERPL: 1.6 G/DL
ALP SERPL-CCNC: 69 U/L (ref 39–117)
ALT SERPL W P-5'-P-CCNC: 18 U/L (ref 1–33)
ANION GAP SERPL CALCULATED.3IONS-SCNC: 9 MMOL/L (ref 5–15)
AST SERPL-CCNC: 20 U/L (ref 1–32)
BASOPHILS # BLD AUTO: 0.05 10*3/MM3 (ref 0–0.2)
BASOPHILS NFR BLD AUTO: 1.1 % (ref 0–1.5)
BILIRUB SERPL-MCNC: 0.3 MG/DL (ref 0–1.2)
BUN SERPL-MCNC: 15 MG/DL (ref 8–23)
BUN/CREAT SERPL: 19.7 (ref 7–25)
CALCIUM SPEC-SCNC: 8.7 MG/DL (ref 8.6–10.5)
CHLORIDE SERPL-SCNC: 107 MMOL/L (ref 98–107)
CHOLEST SERPL-MCNC: 166 MG/DL (ref 0–200)
CO2 SERPL-SCNC: 26 MMOL/L (ref 22–29)
CREAT SERPL-MCNC: 0.76 MG/DL (ref 0.57–1)
DEPRECATED RDW RBC AUTO: 41.4 FL (ref 37–54)
EOSINOPHIL # BLD AUTO: 0.13 10*3/MM3 (ref 0–0.4)
EOSINOPHIL NFR BLD AUTO: 2.9 % (ref 0.3–6.2)
ERYTHROCYTE [DISTWIDTH] IN BLOOD BY AUTOMATED COUNT: 12.6 % (ref 12.3–15.4)
GFR SERPL CREATININE-BSD FRML MDRD: 76 ML/MIN/1.73
GLOBULIN UR ELPH-MCNC: 2.7 GM/DL
GLUCOSE SERPL-MCNC: 88 MG/DL (ref 65–99)
HCT VFR BLD AUTO: 40 % (ref 34–46.6)
HDLC SERPL-MCNC: 48 MG/DL (ref 40–60)
HGB BLD-MCNC: 12.6 G/DL (ref 12–15.9)
IMM GRANULOCYTES # BLD AUTO: 0.02 10*3/MM3 (ref 0–0.05)
IMM GRANULOCYTES NFR BLD AUTO: 0.4 % (ref 0–0.5)
LDLC SERPL CALC-MCNC: 102 MG/DL (ref 0–100)
LDLC/HDLC SERPL: 2.1 {RATIO}
LYMPHOCYTES # BLD AUTO: 0.87 10*3/MM3 (ref 0.7–3.1)
LYMPHOCYTES NFR BLD AUTO: 19.6 % (ref 19.6–45.3)
MCH RBC QN AUTO: 28.3 PG (ref 26.6–33)
MCHC RBC AUTO-ENTMCNC: 31.5 G/DL (ref 31.5–35.7)
MCV RBC AUTO: 89.9 FL (ref 79–97)
MONOCYTES # BLD AUTO: 0.58 10*3/MM3 (ref 0.1–0.9)
MONOCYTES NFR BLD AUTO: 13 % (ref 5–12)
NEUTROPHILS NFR BLD AUTO: 2.8 10*3/MM3 (ref 1.7–7)
NEUTROPHILS NFR BLD AUTO: 63 % (ref 42.7–76)
NRBC BLD AUTO-RTO: 0 /100 WBC (ref 0–0.2)
PLATELET # BLD AUTO: 187 10*3/MM3 (ref 140–450)
PMV BLD AUTO: 11.9 FL (ref 6–12)
POTASSIUM SERPL-SCNC: 4.1 MMOL/L (ref 3.5–5.2)
PROT SERPL-MCNC: 6.9 G/DL (ref 6–8.5)
RBC # BLD AUTO: 4.45 10*6/MM3 (ref 3.77–5.28)
SODIUM SERPL-SCNC: 142 MMOL/L (ref 136–145)
T4 FREE SERPL-MCNC: 1.48 NG/DL (ref 0.93–1.7)
TRIGL SERPL-MCNC: 86 MG/DL (ref 0–150)
TSH SERPL DL<=0.05 MIU/L-ACNC: 1.71 UIU/ML (ref 0.27–4.2)
VLDLC SERPL-MCNC: 16 MG/DL (ref 5–40)
WBC # BLD AUTO: 4.45 10*3/MM3 (ref 3.4–10.8)

## 2021-01-04 PROCEDURE — 84439 ASSAY OF FREE THYROXINE: CPT | Performed by: INTERNAL MEDICINE

## 2021-01-04 PROCEDURE — 80061 LIPID PANEL: CPT | Performed by: INTERNAL MEDICINE

## 2021-01-04 PROCEDURE — 99214 OFFICE O/P EST MOD 30 MIN: CPT | Performed by: INTERNAL MEDICINE

## 2021-01-04 PROCEDURE — 85025 COMPLETE CBC W/AUTO DIFF WBC: CPT

## 2021-01-04 PROCEDURE — 36415 COLL VENOUS BLD VENIPUNCTURE: CPT

## 2021-01-04 PROCEDURE — 80053 COMPREHEN METABOLIC PANEL: CPT

## 2021-01-04 PROCEDURE — 86769 SARS-COV-2 COVID-19 ANTIBODY: CPT | Performed by: INTERNAL MEDICINE

## 2021-01-04 PROCEDURE — 84443 ASSAY THYROID STIM HORMONE: CPT | Performed by: INTERNAL MEDICINE

## 2021-01-04 PROCEDURE — 82306 VITAMIN D 25 HYDROXY: CPT | Performed by: INTERNAL MEDICINE

## 2021-01-04 RX ORDER — ERGOCALCIFEROL 1.25 MG/1
50000 CAPSULE ORAL
Qty: 4 CAPSULE | Refills: 5 | Status: SHIPPED | OUTPATIENT
Start: 2021-01-04 | End: 2022-04-22 | Stop reason: SDUPTHER

## 2021-01-04 NOTE — ASSESSMENT & PLAN NOTE
Higher bp - more stress   Monitor at home and call if over 135/85  Continue current dose of lisinopril for now

## 2021-01-04 NOTE — PROGRESS NOTES
Nicky Fullredemarcus Owens 65 y.o.  [facility]:Follow-up, Hypertension, Hypothyroidism, Hyperlipidemia, and Multiple Sclerosis      [place]: Follow-up, Hypertension, Hypothyroidism, Hyperlipidemia, and Multiple Sclerosis    bp is high/normal  Energy is good overall- is on synthroid 88 mcg daily   More stressed - mother recently passed away   Is on prinivil 10 mg daily   Is on low fat diet and zocor 20 mg daily   Is on vitamin D supplement 50,000 u weekly   Her family has been affected with pandemic and it has been very stressful     Allergies   Allergen Reactions   • Penicillins        Current Outpatient Medications:   •  AUBAGIO 14 MG tablet, TAKE ONE TABLET BY MOUTH ONCE DAILY. (TX), Disp: 30 tablet, Rfl: 11  •  buPROPion XL (Wellbutrin XL) 150 MG 24 hr tablet, Take 1 tablet by mouth Every Morning., Disp: 30 tablet, Rfl: 11  •  estradiol (MINIVELLE, VIVELLE-DOT) 0.05 MG/24HR patch, Place 1 patch on the skin 1 (One) Time Per Week., Disp: , Rfl:   •  lisinopril (PRINIVIL,ZESTRIL) 10 MG tablet, TAKE ONE TABLET BY MOUTH DAILY, Disp: 30 tablet, Rfl: 4  •  simvastatin (ZOCOR) 20 MG tablet, Take 1 tablet by mouth Daily., Disp: 30 tablet, Rfl: 6  •  Synthroid 88 MCG tablet, TAKE ONE TABLET BY MOUTH DAILY, Disp: 30 tablet, Rfl: 3  •  TOVIAZ 4 MG tablet sustained-release 24 hour tablet, , Disp: , Rfl:   •  vitamin D (ERGOCALCIFEROL) 1.25 MG (58389 UT) capsule capsule, TAKE ONE CAPSULE BY MOUTH EVERY 7 DAYS, Disp: 4 capsule, Rfl: 6  Patient Active Problem List    Diagnosis   • Major depressive disorder with current active episode [F32.9]   • Urinary urgency [R39.15]   • Bruising [T14.8XXA]   • Acute gastritis [K29.00]   • Abdominal pain RUQ [R10.9]   • Visit for screening mammogram [Z12.31]   • Anemia [D64.9]   • Anorexia [R63.0]   • Anxiety [F41.9]   • Benign essential hypertension [I10]   • Bone cyst [M85.60]   • H/O colonoscopy [Z98.890]   • Degeneration of intervertebral disc of lumbar region [M51.36]   • Dysphagia [R13.10]   • Erosive  esophagitis [K22.10]   • Essential tremor [G25.0]   • Hyperlipidemia [E78.5]   • Hyperreflexia [R29.2]   • Hypothyroidism [E03.9]   • Multiple sclerosis (CMS/HCC) [G35]   • Heart murmur [R01.1]   • Muscle weakness [M62.81]   • Nausea [R11.0]   • Nonallopathic lesion of cervical region [M99.9]   • Osteoporosis [M81.0]   • Shoulder pain [M25.519]   • Avitaminosis D [E55.9]   • Cobalamin deficiency [E53.8]   • Tremor [R25.1]     Review of Systems   Constitutional: Negative for activity change and fatigue.   HENT: Negative for congestion and rhinorrhea.    Eyes: Negative for pain and visual disturbance.   Respiratory: Negative for cough and shortness of breath.    Cardiovascular: Negative for palpitations and leg swelling.   Gastrointestinal: Negative for constipation and diarrhea.   Endocrine: Negative for polydipsia and polyphagia.   Genitourinary: Negative for frequency and urgency.   Musculoskeletal: Negative for arthralgias and gait problem.   Skin: Negative for pallor and rash.   Neurological: Positive for weakness. Negative for dizziness.   Psychiatric/Behavioral: Positive for dysphoric mood. The patient is nervous/anxious.      Social History     Socioeconomic History   • Marital status:      Spouse name: Not on file   • Number of children: Not on file   • Years of education: Not on file   • Highest education level: Not on file   Tobacco Use   • Smoking status: Never Smoker   • Smokeless tobacco: Never Used   Substance and Sexual Activity   • Alcohol use: No   • Drug use: No   • Sexual activity: Defer     Family History   Problem Relation Age of Onset   • Heart disease Mother    • Hypertension Mother    • Heart disease Father    • Hypertension Father    • Hypertension Sister    • Other Sister         malignant neoplasm   • Mental illness Sister    • Stroke Brother    • Hypertension Brother    • Mental illness Brother    • Alzheimer's disease Other         Aunt     /80   Pulse 77   Ht 157.5 cm  "(62\")   Wt 66.6 kg (146 lb 12.8 oz)   SpO2 98%   BMI 26.85 kg/m²   Physical Exam  Vitals signs and nursing note reviewed.   Constitutional:       Appearance: Normal appearance.   HENT:      Head: Normocephalic and atraumatic.   Eyes:      Extraocular Movements: Extraocular movements intact.      Pupils: Pupils are equal, round, and reactive to light.   Neck:      Musculoskeletal: Normal range of motion and neck supple.   Cardiovascular:      Rate and Rhythm: Normal rate and regular rhythm.      Pulses: Normal pulses.      Heart sounds: Murmur present.   Pulmonary:      Effort: Pulmonary effort is normal.      Breath sounds: Normal breath sounds.   Musculoskeletal: Normal range of motion.   Skin:     General: Skin is warm and dry.   Neurological:      Mental Status: She is alert.   Psychiatric:         Mood and Affect: Mood normal.         Behavior: Behavior normal.         Thought Content: Thought content normal.       Results for orders placed or performed in visit on 06/12/20   Comprehensive Metabolic Panel    Specimen: Blood   Result Value Ref Range    Glucose 88 65 - 99 mg/dL    BUN 12 8 - 23 mg/dL    Creatinine 0.76 0.57 - 1.00 mg/dL    Sodium 141 136 - 145 mmol/L    Potassium 4.3 3.5 - 5.2 mmol/L    Chloride 105 98 - 107 mmol/L    CO2 26.5 22.0 - 29.0 mmol/L    Calcium 9.0 8.6 - 10.5 mg/dL    Total Protein 7.1 6.0 - 8.5 g/dL    Albumin 4.10 3.50 - 5.20 g/dL    ALT (SGPT) 23 1 - 33 U/L    AST (SGOT) 22 1 - 32 U/L    Alkaline Phosphatase 65 39 - 117 U/L    Total Bilirubin 0.3 0.2 - 1.2 mg/dL    eGFR Non African Amer 76 >60 mL/min/1.73    Globulin 3.0 gm/dL    A/G Ratio 1.4 g/dL    BUN/Creatinine Ratio 15.8 7.0 - 25.0    Anion Gap 9.5 5.0 - 15.0 mmol/L   Lipid Panel    Specimen: Blood   Result Value Ref Range    Total Cholesterol 238 (H) 0 - 200 mg/dL    Triglycerides 114 0 - 150 mg/dL    HDL Cholesterol 39 (L) 40 - 60 mg/dL    LDL Cholesterol  176 (H) 0 - 100 mg/dL    VLDL Cholesterol 22.8 5 - 40 mg/dL    " LDL/HDL Ratio 4.52    TSH    Specimen: Blood   Result Value Ref Range    TSH 0.971 0.270 - 4.200 uIU/mL   T4, Free    Specimen: Blood   Result Value Ref Range    Free T4 1.50 0.93 - 1.70 ng/dL   Vitamin D 25 Hydroxy    Specimen: Blood   Result Value Ref Range    25 Hydroxy, Vitamin D 40.3 30.0 - 100.0 ng/ml     Problems Addressed this Visit        Other    Hypothyroidism - Primary     On synthroid 88 mcg daily   No missed doses or problems with dosing  Check tfts today          Relevant Orders    TSH (Completed)    T4, Free (Completed)    SARS-CoV-2 Antibodies (Roche) (Completed)    Hyperlipidemia     Is on low fat diet and zocor 20 mg daily   She is concerned about risk of CAD (both parents with bypass)  Discussed pros and cons of screening nicolette cor ca scoring  She will let us know if she would like to proceed with this          Relevant Orders    Lipid Panel (Completed)    Benign essential hypertension     Higher bp - more stress   Monitor at home and call if over 135/85  Continue current dose of lisinopril for now          Avitaminosis D     On supplement 95670 u weekly   Check levels          Relevant Orders    Vitamin D 25 Hydroxy (Completed)      Diagnoses       Codes Comments    Acquired hypothyroidism    -  Primary ICD-10-CM: E03.9  ICD-9-CM: 244.9     Mixed hyperlipidemia     ICD-10-CM: E78.2  ICD-9-CM: 272.2     Avitaminosis D     ICD-10-CM: E55.9  ICD-9-CM: 268.9     Benign essential hypertension     ICD-10-CM: I10  ICD-9-CM: 401.1         Return in about 6 months (around 7/4/2021) for Recheck.    Jennifer Rubi MA  Signed Tierney Mata MD

## 2021-01-04 NOTE — ASSESSMENT & PLAN NOTE
Is on low fat diet and zocor 20 mg daily   She is concerned about risk of CAD (both parents with bypass)  Discussed pros and cons of screening nicolette cor ca scoring  She will let us know if she would like to proceed with this

## 2021-01-05 LAB — SARS-COV-2 AB SERPL QL IA: NEGATIVE

## 2021-01-19 ENCOUNTER — TELEPHONE (OUTPATIENT)
Dept: NEUROLOGY | Facility: CLINIC | Age: 66
End: 2021-01-19

## 2021-01-19 NOTE — TELEPHONE ENCOUNTER
Spoke with patient. Informed her we do not offer the vaccine at our office. Told her to contact her PCP or local health department to see if they could better guide her on getting the vaccine. Patient verbalized understanding.

## 2021-01-19 NOTE — TELEPHONE ENCOUNTER
Caller: RENZO VEGA    Relationship: SELF    Best call back number: [phone]    What medication are you requesting: COVID VACCINE      Additional notes: THE PT IS 65 AND SHE IS WONDERING IF SHE CAN GET THE COVID VACCINE THROUGH DR CASTILLO'S OFFICE AND SHE IS WONDERING IF SHE SHOULD GET IT REGARDING SHE DOES HAVE MS. PLEASE GIVE HER A CALL TO ADVISE.

## 2021-02-22 ENCOUNTER — IMMUNIZATION (OUTPATIENT)
Dept: VACCINE CLINIC | Facility: HOSPITAL | Age: 66
End: 2021-02-22

## 2021-02-22 PROCEDURE — 91300 HC SARSCOV02 VAC 30MCG/0.3ML IM: CPT | Performed by: INTERNAL MEDICINE

## 2021-02-22 PROCEDURE — 0001A: CPT | Performed by: INTERNAL MEDICINE

## 2021-02-22 RX ORDER — SIMVASTATIN 20 MG
TABLET ORAL
Qty: 30 TABLET | Refills: 5 | Status: SHIPPED | OUTPATIENT
Start: 2021-02-22 | End: 2021-08-20

## 2021-02-22 NOTE — TELEPHONE ENCOUNTER
Refill request.    LOV:  01/04/21  Future visit:  07/06/21    Last refill:  06/14/21  Q.  30  R.  6  Sig:  : Take 1 tablet by mouth Daily.    Kyleigh

## 2021-03-05 ENCOUNTER — OFFICE VISIT (OUTPATIENT)
Dept: NEUROLOGY | Facility: CLINIC | Age: 66
End: 2021-03-05

## 2021-03-05 ENCOUNTER — LAB (OUTPATIENT)
Dept: LAB | Facility: HOSPITAL | Age: 66
End: 2021-03-05

## 2021-03-05 VITALS
OXYGEN SATURATION: 99 % | HEIGHT: 62 IN | WEIGHT: 142 LBS | DIASTOLIC BLOOD PRESSURE: 76 MMHG | HEART RATE: 77 BPM | SYSTOLIC BLOOD PRESSURE: 142 MMHG | BODY MASS INDEX: 26.13 KG/M2

## 2021-03-05 DIAGNOSIS — R39.15 URINARY URGENCY: Chronic | ICD-10-CM

## 2021-03-05 DIAGNOSIS — G35 MULTIPLE SCLEROSIS (HCC): Primary | Chronic | ICD-10-CM

## 2021-03-05 DIAGNOSIS — G35 MULTIPLE SCLEROSIS (HCC): ICD-10-CM

## 2021-03-05 DIAGNOSIS — G25.0 ESSENTIAL TREMOR: Chronic | ICD-10-CM

## 2021-03-05 LAB
ALBUMIN SERPL-MCNC: 4.1 G/DL (ref 3.5–5.2)
ALBUMIN/GLOB SERPL: 1.6 G/DL
ALP SERPL-CCNC: 64 U/L (ref 39–117)
ALT SERPL W P-5'-P-CCNC: 25 U/L (ref 1–33)
ANION GAP SERPL CALCULATED.3IONS-SCNC: 6.9 MMOL/L (ref 5–15)
AST SERPL-CCNC: 23 U/L (ref 1–32)
BASOPHILS # BLD AUTO: 0.05 10*3/MM3 (ref 0–0.2)
BASOPHILS NFR BLD AUTO: 1.1 % (ref 0–1.5)
BILIRUB SERPL-MCNC: 0.2 MG/DL (ref 0–1.2)
BUN SERPL-MCNC: 11 MG/DL (ref 8–23)
BUN/CREAT SERPL: 14.5 (ref 7–25)
CALCIUM SPEC-SCNC: 8.9 MG/DL (ref 8.6–10.5)
CHLORIDE SERPL-SCNC: 106 MMOL/L (ref 98–107)
CO2 SERPL-SCNC: 27.1 MMOL/L (ref 22–29)
CREAT SERPL-MCNC: 0.76 MG/DL (ref 0.57–1)
DEPRECATED RDW RBC AUTO: 40.4 FL (ref 37–54)
EOSINOPHIL # BLD AUTO: 0.15 10*3/MM3 (ref 0–0.4)
EOSINOPHIL NFR BLD AUTO: 3.2 % (ref 0.3–6.2)
ERYTHROCYTE [DISTWIDTH] IN BLOOD BY AUTOMATED COUNT: 12.5 % (ref 12.3–15.4)
GFR SERPL CREATININE-BSD FRML MDRD: 76 ML/MIN/1.73
GLOBULIN UR ELPH-MCNC: 2.5 GM/DL
GLUCOSE SERPL-MCNC: 87 MG/DL (ref 65–99)
HCT VFR BLD AUTO: 37.7 % (ref 34–46.6)
HGB BLD-MCNC: 12.3 G/DL (ref 12–15.9)
IMM GRANULOCYTES # BLD AUTO: 0.01 10*3/MM3 (ref 0–0.05)
IMM GRANULOCYTES NFR BLD AUTO: 0.2 % (ref 0–0.5)
LYMPHOCYTES # BLD AUTO: 1 10*3/MM3 (ref 0.7–3.1)
LYMPHOCYTES NFR BLD AUTO: 21.4 % (ref 19.6–45.3)
MCH RBC QN AUTO: 29 PG (ref 26.6–33)
MCHC RBC AUTO-ENTMCNC: 32.6 G/DL (ref 31.5–35.7)
MCV RBC AUTO: 88.9 FL (ref 79–97)
MONOCYTES # BLD AUTO: 0.58 10*3/MM3 (ref 0.1–0.9)
MONOCYTES NFR BLD AUTO: 12.4 % (ref 5–12)
NEUTROPHILS NFR BLD AUTO: 2.89 10*3/MM3 (ref 1.7–7)
NEUTROPHILS NFR BLD AUTO: 61.7 % (ref 42.7–76)
NRBC BLD AUTO-RTO: 0 /100 WBC (ref 0–0.2)
PLATELET # BLD AUTO: 205 10*3/MM3 (ref 140–450)
PMV BLD AUTO: 11.5 FL (ref 6–12)
POTASSIUM SERPL-SCNC: 4.1 MMOL/L (ref 3.5–5.2)
PROT SERPL-MCNC: 6.6 G/DL (ref 6–8.5)
RBC # BLD AUTO: 4.24 10*6/MM3 (ref 3.77–5.28)
SODIUM SERPL-SCNC: 140 MMOL/L (ref 136–145)
WBC # BLD AUTO: 4.68 10*3/MM3 (ref 3.4–10.8)

## 2021-03-05 PROCEDURE — 36415 COLL VENOUS BLD VENIPUNCTURE: CPT

## 2021-03-05 PROCEDURE — 85025 COMPLETE CBC W/AUTO DIFF WBC: CPT

## 2021-03-05 PROCEDURE — 99214 OFFICE O/P EST MOD 30 MIN: CPT | Performed by: PSYCHIATRY & NEUROLOGY

## 2021-03-05 PROCEDURE — 80053 COMPREHEN METABOLIC PANEL: CPT

## 2021-03-05 NOTE — PROGRESS NOTES
"Chief Complaint  Multiple Sclerosis    Subjective          Nicky Owens presents to [facility] for RRMS, essential tremor.     History of Present Illness    65 y.o. female returns in follow up.  Last visit on 9/4/20 continued Aubagio, Toviaz, Wellbutrin.      MRI Brain, my review of films, 8/28/20 as compared to 3/28/19 as compared to 12/26/17,  Mild T2 lesion load, no new, enlarging or enhancing lesions       6/12/20  CMP, TSH NCS      MDD     Mother recently passed after Thanksgiving from Covid 19      RRMS     No new or worsening sx.       Fatigue is controlled.     Tolerating Aubagio without side effects.          Essential Tremor     Tremor increased when sitting up.  Still does not wish to start medications.       Urinary urgency      Toviaz improving frequency and urgrency       Objective   Vital Signs:   /76   Pulse 77   Ht 157.5 cm (62.01\")   Wt 64.4 kg (142 lb)   SpO2 99%   BMI 25.97 kg/m²     Physical Exam  Eyes:      Extraocular Movements: EOM normal.      Pupils: Pupils are equal, round, and reactive to light.   Neurological:      Mental Status: She is oriented to person, place, and time.      Gait: Gait is intact.      Deep Tendon Reflexes: Strength normal.   Psychiatric:         Speech: Speech normal.          Neurologic Exam     Mental Status   Oriented to person, place, and time.   Follows 3 step commands.   Attention: normal. Concentration: normal.   Speech: speech is normal   Level of consciousness: alert  Knowledge: good and consistent with education.   Normal comprehension.     Cranial Nerves   Cranial nerves II through XII intact.     CN II   Visual fields full to confrontation.   Visual acuity: normal  Right visual field deficit: none  Left visual field deficit: none     CN III, IV, VI   Pupils are equal, round, and reactive to light.  Extraocular motions are normal.   Nystagmus: none   Diplopia: none  Ophthalmoparesis: none  Upgaze: " normal  Downgaze: normal  Conjugate gaze: present    CN V   Facial sensation intact.   Right corneal reflex: normal  Left corneal reflex: normal    CN VII   Right facial weakness: none  Left facial weakness: none    CN VIII   Hearing: intact    CN IX, X   Palate: symmetric  Right gag reflex: normal  Left gag reflex: normal    CN XI   Right sternocleidomastoid strength: normal  Left sternocleidomastoid strength: normal    CN XII   Tongue: not atrophic  Fasciculations: absent  Tongue deviation: none    Motor Exam   Muscle bulk: normal  Overall muscle tone: normal    Strength   Strength 5/5 throughout.     Sensory Exam   Light touch normal.     Gait, Coordination, and Reflexes     Gait  Gait: normal    Tremor   Resting tremor: absent  Intention tremor: absent  Action tremor: absent    Reflexes   Reflexes 2+ except as noted.      Result Review :   The following data was reviewed by: Jorge L Donovan MD on 03/05/2021:  Common labs    Common Labsle 6/12/20 6/12/20 1/4/21 1/4/21 1/4/21    1053 1053 1053 1053 1053   Glucose 88  88     BUN 12  15     Creatinine 0.76  0.76     eGFR Non African Am 76  76     Sodium 141  142     Potassium 4.3  4.1     Chloride 105  107     Calcium 9.0  8.7     Albumin 4.10  4.20     Total Bilirubin 0.3  0.3     Alkaline Phosphatase 65  69     AST (SGOT) 22  20     ALT (SGPT) 23  18     WBC    4.45    Hemoglobin    12.6    Hematocrit    40.0    Platelets    187    Total Cholesterol  238 (A)   166   Triglycerides  114   86   HDL Cholesterol  39 (A)   48   LDL Cholesterol   176 (A)   102 (A)   (A) Abnormal value                      Assessment and Plan    Diagnoses and all orders for this visit:    1. Multiple sclerosis (CMS/HCC) (Primary)  Assessment & Plan:  Sx stable on Aubagio     CBC, CMP       Orders:  -     CBC & Differential; Future  -     Comprehensive Metabolic Panel; Future    2. Essential tremor  Assessment & Plan:  Sx minimal       3. Urinary urgency  Assessment & Plan:  Sx Toviaz          Follow Up   No follow-ups on file.  Patient was given instructions and counseling regarding her condition or for health maintenance advice. Please see specific information pulled into the AVS if appropriate.

## 2021-03-09 ENCOUNTER — TELEPHONE (OUTPATIENT)
Dept: NEUROLOGY | Facility: CLINIC | Age: 66
End: 2021-03-09

## 2021-03-09 NOTE — TELEPHONE ENCOUNTER
Medical Records Request    Patient Name: RENZO LANDA  : 1955  Caller: JAIME HARRISON    Type of records being requested: OFFICE NOTE FROM VISIT 3-5-2021     Reason for request: FOR CONTINUATION OF CARE    Preferred method faxed, , or mail: [phone] ATTN: JAIME Tamayo call back number: [phone]

## 2021-03-12 ENCOUNTER — TRANSCRIBE ORDERS (OUTPATIENT)
Dept: ADMINISTRATIVE | Facility: HOSPITAL | Age: 66
End: 2021-03-12

## 2021-03-12 DIAGNOSIS — E89.40 POSTSURGICAL MENOPAUSE: ICD-10-CM

## 2021-03-12 DIAGNOSIS — Z13.820 OSTEOPOROSIS SCREENING: Primary | ICD-10-CM

## 2021-03-15 ENCOUNTER — IMMUNIZATION (OUTPATIENT)
Dept: VACCINE CLINIC | Facility: HOSPITAL | Age: 66
End: 2021-03-15

## 2021-03-15 PROCEDURE — 91300 HC SARSCOV02 VAC 30MCG/0.3ML IM: CPT | Performed by: INTERNAL MEDICINE

## 2021-03-15 PROCEDURE — 0002A: CPT | Performed by: INTERNAL MEDICINE

## 2021-03-24 RX ORDER — LEVOTHYROXINE SODIUM 88 MCG
TABLET ORAL
Qty: 30 TABLET | Refills: 2 | Status: SHIPPED | OUTPATIENT
Start: 2021-03-24 | End: 2021-06-21

## 2021-03-24 RX ORDER — LISINOPRIL 10 MG/1
TABLET ORAL
Qty: 30 TABLET | Refills: 3 | Status: SHIPPED | OUTPATIENT
Start: 2021-03-24 | End: 2021-07-06 | Stop reason: SDUPTHER

## 2021-03-24 NOTE — TELEPHONE ENCOUNTER
Refill request - Dr. Mata.    LOV:  01/04/21  Future visit:  07/06/21    Last refill:  Lisinopril   10/27/20  Q.  30 tabs  R.  4  Sig:  Take one tablet by mouth daily.      Last refill:  Synthoid  11/27/20  Q.  30 tabs  R.  3  Sig:  Take one tablet by mouth daily.    Kyleigh

## 2021-04-16 ENCOUNTER — HOSPITAL ENCOUNTER (OUTPATIENT)
Dept: BONE DENSITY | Facility: HOSPITAL | Age: 66
Discharge: HOME | End: 2021-04-16
Admitting: OBSTETRICS & GYNECOLOGY

## 2021-04-16 DIAGNOSIS — E89.40 POSTSURGICAL MENOPAUSE: ICD-10-CM

## 2021-04-16 DIAGNOSIS — Z13.820 OSTEOPOROSIS SCREENING: ICD-10-CM

## 2021-04-16 PROCEDURE — 77080 DXA BONE DENSITY AXIAL: CPT

## 2021-04-22 RX ORDER — BUPROPION HYDROCHLORIDE 150 MG/1
TABLET ORAL
Qty: 60 TABLET | Refills: 5 | Status: SHIPPED | OUTPATIENT
Start: 2021-04-22 | End: 2021-05-07 | Stop reason: SDUPTHER

## 2021-04-30 ENCOUNTER — TELEPHONE (OUTPATIENT)
Dept: NEUROLOGY | Facility: CLINIC | Age: 66
End: 2021-04-30

## 2021-04-30 ENCOUNTER — TELEPHONE (OUTPATIENT)
Dept: ENDOCRINOLOGY | Facility: CLINIC | Age: 66
End: 2021-04-30

## 2021-04-30 DIAGNOSIS — E78.2 MIXED HYPERLIPIDEMIA: Primary | ICD-10-CM

## 2021-04-30 NOTE — TELEPHONE ENCOUNTER
Past 2-3 weeks, new symptoms of numbness, tingling down left side of body. Also, experienced more fatigue than normal lately. Patient is concerned since it is a new symptom. Informed patient I would notify Dr. Donovan and get back with her. Patient verbalized understanding.

## 2021-04-30 NOTE — TELEPHONE ENCOUNTER
Provider:     Caller: PT    Relationship to Patient: SELF    Pharmacy: NA    Phone Number: [phone]    Reason for Call: PATIENT IS CALLING TO SPEAK WITH THE NURSE ABOUT SOME ISSUES SHE IS HAVING. SHE DID NOT WISH TO LEAVE ME WITH ANY OTHER INFORMATION THAN THAT. PLEASE ADVISE.     When was the patient last seen: 3-5-21

## 2021-04-30 NOTE — TELEPHONE ENCOUNTER
PT CALLED STATING THAT ON HE RLAST VISIT SHE SPOKE W/ SOMEONE IN REGARDS TO A HEART SCAN. SHE WAS UNSURE ON THE SPECIFICS BUT STATED THAT SHE IS INTERESTED IN HAVING ONE DONE. PLEASE CONSULT W/ DR AND REACH OUT TO PT AT EARLIEST CONVENIENCE. THANK YOU

## 2021-05-07 ENCOUNTER — OFFICE VISIT (OUTPATIENT)
Dept: NEUROLOGY | Facility: CLINIC | Age: 66
End: 2021-05-07

## 2021-05-07 VITALS
HEART RATE: 80 BPM | OXYGEN SATURATION: 98 % | WEIGHT: 139 LBS | HEIGHT: 62 IN | BODY MASS INDEX: 25.58 KG/M2 | SYSTOLIC BLOOD PRESSURE: 142 MMHG | TEMPERATURE: 96.6 F | DIASTOLIC BLOOD PRESSURE: 82 MMHG

## 2021-05-07 DIAGNOSIS — G25.0 ESSENTIAL TREMOR: Chronic | ICD-10-CM

## 2021-05-07 DIAGNOSIS — F32.9 MAJOR DEPRESSIVE DISORDER WITH CURRENT ACTIVE EPISODE, UNSPECIFIED DEPRESSION EPISODE SEVERITY, UNSPECIFIED WHETHER RECURRENT: ICD-10-CM

## 2021-05-07 DIAGNOSIS — G35 MULTIPLE SCLEROSIS (HCC): Primary | Chronic | ICD-10-CM

## 2021-05-07 PROCEDURE — 99214 OFFICE O/P EST MOD 30 MIN: CPT | Performed by: PSYCHIATRY & NEUROLOGY

## 2021-05-07 RX ORDER — BUPROPION HYDROCHLORIDE 150 MG/1
150 TABLET ORAL EVERY MORNING
Qty: 90 TABLET | Refills: 3 | Status: SHIPPED | OUTPATIENT
Start: 2021-05-07 | End: 2022-04-22 | Stop reason: SDUPTHER

## 2021-05-07 RX ORDER — ESOMEPRAZOLE MAGNESIUM 40 MG/1
CAPSULE, DELAYED RELEASE ORAL
COMMUNITY
End: 2021-05-07

## 2021-05-07 NOTE — ASSESSMENT & PLAN NOTE
Patient's depression is recurrent and is moderate without psychosis. Their depression is currently active and the condition is worsening. This will be reassessed at the next regular appointment. F/U as described:patient will continue current medication therapy and patient was prescribed an antidepressant medicine     Add Zoloft 50 mg daily  Wellbutrin  mg qam

## 2021-05-07 NOTE — PROGRESS NOTES
"Chief Complaint  Multiple Sclerosis    Subjective          Nicky Owens presents to [facility] NEUROLOGY.    History of Present Illness    65 y.o. female returns in follow up.  Last visit on 3/5/21 continued Aubagio, Toviaz, Wellbutrin.      MRI Brain 8/28/20 as compared to 3/28/19 as compared to 12/26/17,  Mild T2 lesion load, no new, enlarging or enhancing lesions       6/12/20  CMP, TSH NCS      MDD     Pt report last 2 - 3 weeks N/T left side of body.      Mother recently passed after Thanksgiving from Covid 19       RRMS     L UE and LE paresthesias.  New onset.  Mild intensity.  Increased fatigue.  Intermittently present.     Restorative sleep.       Tolerating Aubagio without side effects.          Essential Tremor     Tremor increased when sitting up.  Still does not wish to start medications.       Urinary urgency      Toviaz improving frequency and urgrency      Objective   Vital Signs:   /82   Pulse 80   Temp 96.6 °F (35.9 °C)   Ht 157.5 cm (62.01\")   Wt 63 kg (139 lb)   SpO2 98%   BMI 25.42 kg/m²     Physical Exam  Eyes:      Extraocular Movements: EOM normal.      Pupils: Pupils are equal, round, and reactive to light.   Neurological:      Mental Status: She is oriented to person, place, and time.      Gait: Gait is intact.      Deep Tendon Reflexes: Strength normal.   Psychiatric:         Speech: Speech normal.          Neurologic Exam     Mental Status   Oriented to person, place, and time.   Speech: speech is normal   Level of consciousness: alert  Knowledge: good and consistent with education.   Normal comprehension.     Cranial Nerves   Cranial nerves II through XII intact.     CN II   Visual fields full to confrontation.   Visual acuity: normal  Right visual field deficit: none  Left visual field deficit: none     CN III, IV, VI   Pupils are equal, round, and reactive to light.  Extraocular motions are normal.   Nystagmus: none   Diplopia: " none  Ophthalmoparesis: none  Upgaze: normal  Downgaze: normal  Conjugate gaze: present    CN V   Facial sensation intact.   Right corneal reflex: normal  Left corneal reflex: normal    CN VII   Right facial weakness: none  Left facial weakness: none    CN VIII   Hearing: intact    CN IX, X   Palate: symmetric  Right gag reflex: normal  Left gag reflex: normal    CN XI   Right sternocleidomastoid strength: normal  Left sternocleidomastoid strength: normal    CN XII   Tongue: not atrophic  Fasciculations: absent  Tongue deviation: none    Motor Exam   Muscle bulk: normal  Overall muscle tone: normal    Strength   Strength 5/5 throughout.     Sensory Exam   Light touch normal.     Gait, Coordination, and Reflexes     Gait  Gait: normal    Tremor   Resting tremor: absent  Intention tremor: absent  Action tremor: absent    Reflexes   Reflexes 2+ except as noted.      Result Review :   The following data was reviewed by: Jorge L Donovan MD on 05/07/2021:  Common labs    Common Labsle 6/12/20 6/12/20 1/4/21 1/4/21 1/4/21 3/5/21 3/5/21    1053 1053 1053 1053 1053 1352 1352   Glucose 88  88   87    BUN 12  15   11    Creatinine 0.76  0.76   0.76    eGFR Non African Am 76  76   76    Sodium 141  142   140    Potassium 4.3  4.1   4.1    Chloride 105  107   106    Calcium 9.0  8.7   8.9    Albumin 4.10  4.20   4.10    Total Bilirubin 0.3  0.3   0.2    Alkaline Phosphatase 65  69   64    AST (SGOT) 22  20   23    ALT (SGPT) 23  18   25    WBC    4.45   4.68   Hemoglobin    12.6   12.3   Hematocrit    40.0   37.7   Platelets    187   205   Total Cholesterol  238 (A)   166     Triglycerides  114   86     HDL Cholesterol  39 (A)   48     LDL Cholesterol   176 (A)   102 (A)     (A) Abnormal value                      Assessment and Plan    Diagnoses and all orders for this visit:    1. Multiple sclerosis (CMS/HCC) (Primary)  Assessment & Plan:  Continue Aubagio       2. Essential tremor    3. Major depressive disorder with  current active episode, unspecified depression episode severity, unspecified whether recurrent  Assessment & Plan:  Patient's depression is recurrent and is moderate without psychosis. Their depression is currently active and the condition is worsening. This will be reassessed at the next regular appointment. F/U as described:patient will continue current medication therapy and patient was prescribed an antidepressant medicine     Add Zoloft 50 mg daily  Wellbutrin  mg qam       Other orders  -     buPROPion XL (WELLBUTRIN XL) 150 MG 24 hr tablet; Take 1 tablet by mouth Every Morning.  Dispense: 90 tablet; Refill: 3  -     sertraline (Zoloft) 50 MG tablet; Take 1 tablet by mouth Daily.  Dispense: 30 tablet; Refill: 2      Follow Up   No follow-ups on file.  Patient was given instructions and counseling regarding her condition or for health maintenance advice. Please see specific information pulled into the AVS if appropriate.

## 2021-06-04 ENCOUNTER — HOSPITAL ENCOUNTER (OUTPATIENT)
Dept: CT IMAGING | Facility: HOSPITAL | Age: 66
Discharge: HOME | End: 2021-06-04
Admitting: INTERNAL MEDICINE

## 2021-06-04 PROCEDURE — 75571 CT HRT W/O DYE W/CA TEST: CPT

## 2021-06-21 RX ORDER — LEVOTHYROXINE SODIUM 88 UG/1
TABLET ORAL
Qty: 30 TABLET | Refills: 3 | Status: SHIPPED | OUTPATIENT
Start: 2021-06-21 | End: 2021-07-16

## 2021-07-06 ENCOUNTER — OFFICE VISIT (OUTPATIENT)
Dept: ENDOCRINOLOGY | Facility: CLINIC | Age: 66
End: 2021-07-06

## 2021-07-06 ENCOUNTER — LAB (OUTPATIENT)
Dept: LAB | Facility: HOSPITAL | Age: 66
End: 2021-07-06

## 2021-07-06 VITALS
HEIGHT: 62 IN | WEIGHT: 139.4 LBS | DIASTOLIC BLOOD PRESSURE: 64 MMHG | HEART RATE: 71 BPM | OXYGEN SATURATION: 99 % | SYSTOLIC BLOOD PRESSURE: 118 MMHG | BODY MASS INDEX: 25.65 KG/M2

## 2021-07-06 DIAGNOSIS — E55.9 AVITAMINOSIS D: Primary | ICD-10-CM

## 2021-07-06 DIAGNOSIS — E78.2 MIXED HYPERLIPIDEMIA: ICD-10-CM

## 2021-07-06 DIAGNOSIS — E03.9 ACQUIRED HYPOTHYROIDISM: ICD-10-CM

## 2021-07-06 DIAGNOSIS — I10 BENIGN ESSENTIAL HYPERTENSION: ICD-10-CM

## 2021-07-06 LAB
ALBUMIN SERPL-MCNC: 4.1 G/DL (ref 3.5–5.2)
ALBUMIN/GLOB SERPL: 1.7 G/DL
ALP SERPL-CCNC: 87 U/L (ref 39–117)
ALT SERPL W P-5'-P-CCNC: 29 U/L (ref 1–33)
ANION GAP SERPL CALCULATED.3IONS-SCNC: 9.9 MMOL/L (ref 5–15)
AST SERPL-CCNC: 19 U/L (ref 1–32)
BILIRUB SERPL-MCNC: 0.4 MG/DL (ref 0–1.2)
BUN SERPL-MCNC: 11 MG/DL (ref 8–23)
BUN/CREAT SERPL: 15.3 (ref 7–25)
CALCIUM SPEC-SCNC: 8.9 MG/DL (ref 8.6–10.5)
CHLORIDE SERPL-SCNC: 106 MMOL/L (ref 98–107)
CHOLEST SERPL-MCNC: 260 MG/DL (ref 0–200)
CO2 SERPL-SCNC: 25.1 MMOL/L (ref 22–29)
CREAT SERPL-MCNC: 0.72 MG/DL (ref 0.57–1)
GFR SERPL CREATININE-BSD FRML MDRD: 81 ML/MIN/1.73
GLOBULIN UR ELPH-MCNC: 2.4 GM/DL
GLUCOSE SERPL-MCNC: 101 MG/DL (ref 65–99)
HDLC SERPL-MCNC: 41 MG/DL (ref 40–60)
LDLC SERPL CALC-MCNC: 193 MG/DL (ref 0–100)
LDLC/HDLC SERPL: 4.66 {RATIO}
POTASSIUM SERPL-SCNC: 3.8 MMOL/L (ref 3.5–5.2)
PROT SERPL-MCNC: 6.5 G/DL (ref 6–8.5)
SODIUM SERPL-SCNC: 141 MMOL/L (ref 136–145)
TRIGL SERPL-MCNC: 140 MG/DL (ref 0–150)
TSH SERPL DL<=0.05 MIU/L-ACNC: 0.18 UIU/ML (ref 0.27–4.2)
VLDLC SERPL-MCNC: 26 MG/DL (ref 5–40)

## 2021-07-06 PROCEDURE — 80061 LIPID PANEL: CPT | Performed by: INTERNAL MEDICINE

## 2021-07-06 PROCEDURE — 82306 VITAMIN D 25 HYDROXY: CPT | Performed by: INTERNAL MEDICINE

## 2021-07-06 PROCEDURE — 84443 ASSAY THYROID STIM HORMONE: CPT | Performed by: INTERNAL MEDICINE

## 2021-07-06 PROCEDURE — 99214 OFFICE O/P EST MOD 30 MIN: CPT | Performed by: INTERNAL MEDICINE

## 2021-07-06 PROCEDURE — 80053 COMPREHEN METABOLIC PANEL: CPT | Performed by: INTERNAL MEDICINE

## 2021-07-06 RX ORDER — LISINOPRIL 10 MG/1
10 TABLET ORAL DAILY
Qty: 30 TABLET | Refills: 5 | Status: SHIPPED | OUTPATIENT
Start: 2021-07-06 | End: 2022-01-17

## 2021-07-06 NOTE — PROGRESS NOTES
Nicky Owens 65 y.o.  [facility]:Follow-up, Hypertension, Hypothyroidism, Hyperlipidemia, Multiple Sclerosis, and Vitamin D Deficiency      [place]: Follow-up, Hypertension, Hypothyroidism, Hyperlipidemia, Multiple Sclerosis, and Vitamin D Deficiency    bp is good  Is on synthroid 88 mcg daily   Is on low fat diet and zocor 20 mg daily  Sister recently 5 blockages  Brother MI   Discussed coronary calcium scoring   Continuation of statin therapy discussed     Allergies   Allergen Reactions   • Penicillins        Current Outpatient Medications:   •  AUBAGIO 14 MG tablet, TAKE ONE TABLET BY MOUTH ONCE DAILY. (TX), Disp: 30 tablet, Rfl: 11  •  buPROPion XL (WELLBUTRIN XL) 150 MG 24 hr tablet, Take 1 tablet by mouth Every Morning., Disp: 90 tablet, Rfl: 3  •  estradiol (MINIVELLE, VIVELLE-DOT) 0.05 MG/24HR patch, Place 1 patch on the skin 1 (One) Time Per Week., Disp: , Rfl:   •  levothyroxine (SYNTHROID, LEVOTHROID) 88 MCG tablet, TAKE ONE TABLET BY MOUTH DAILY, Disp: 30 tablet, Rfl: 3  •  lisinopril (PRINIVIL,ZESTRIL) 10 MG tablet, Take 1 tablet by mouth Daily., Disp: 30 tablet, Rfl: 5  •  simvastatin (ZOCOR) 20 MG tablet, TAKE ONE TABLET BY MOUTH DAILY, Disp: 30 tablet, Rfl: 5  •  TOVIAZ 4 MG tablet sustained-release 24 hour tablet, , Disp: , Rfl:   •  vitamin D (ERGOCALCIFEROL) 1.25 MG (68854 UT) capsule capsule, Take 1 capsule by mouth Every 7 (Seven) Days., Disp: 4 capsule, Rfl: 5  Patient Active Problem List    Diagnosis    • Major depressive disorder with current active episode [F32.9]    • Urinary urgency [R39.15]    • Bruising [T14.8XXA]    • Acute gastritis [K29.00]    • Abdominal pain RUQ [R10.9]    • Visit for screening mammogram [Z12.31]    • Anemia [D64.9]    • Anorexia [R63.0]    • Anxiety [F41.9]    • Benign essential hypertension [I10]    • Bone cyst [M85.60]    • H/O colonoscopy [Z98.890]    • Degeneration of intervertebral disc of lumbar region [M51.36]    • Dysphagia [R13.10]    • Erosive esophagitis  [K22.10]    • Essential tremor [G25.0]    • Hyperlipidemia [E78.5]    • Hyperreflexia [R29.2]    • Hypothyroidism [E03.9]    • Multiple sclerosis (CMS/HCC) [G35]    • Heart murmur [R01.1]    • Muscle weakness [M62.81]    • Nausea [R11.0]    • Nonallopathic lesion of cervical region [M99.9]    • Osteoporosis [M81.0]    • Shoulder pain [M25.519]    • Avitaminosis D [E55.9]    • Cobalamin deficiency [E53.8]      Review of Systems   Constitutional: Negative for activity change, appetite change and unexpected weight change.   HENT: Negative for congestion and rhinorrhea.    Eyes: Negative for visual disturbance.   Respiratory: Negative for cough and shortness of breath.    Cardiovascular: Negative for palpitations and leg swelling.   Gastrointestinal: Negative for constipation, diarrhea and nausea.   Genitourinary: Negative for hematuria.   Musculoskeletal: Negative for arthralgias, back pain, gait problem, joint swelling and myalgias.   Skin: Negative for color change, rash and wound.        Hair loss     Allergic/Immunologic: Negative for environmental allergies, food allergies and immunocompromised state.   Neurological: Negative for dizziness, weakness and light-headedness.   Psychiatric/Behavioral: Negative for confusion, decreased concentration, dysphoric mood and sleep disturbance. The patient is not nervous/anxious.      Social History     Socioeconomic History   • Marital status:      Spouse name: Not on file   • Number of children: Not on file   • Years of education: Not on file   • Highest education level: Not on file   Tobacco Use   • Smoking status: Never Smoker   • Smokeless tobacco: Never Used   Vaping Use   • Vaping Use: Never used   Substance and Sexual Activity   • Alcohol use: No   • Drug use: No   • Sexual activity: Defer     Family History   Problem Relation Age of Onset   • Heart disease Mother    • Hypertension Mother    • Heart disease Father    • Hypertension Father    • Hypertension  "Sister    • Other Sister         malignant neoplasm   • Mental illness Sister    • Stroke Brother    • Hypertension Brother    • Mental illness Brother    • Alzheimer's disease Other         Aunt     /64   Pulse 71   Ht 157.5 cm (62\")   Wt 63.2 kg (139 lb 6.4 oz)   SpO2 99%   BMI 25.50 kg/m²   Physical Exam  Vitals and nursing note reviewed.   Constitutional:       Appearance: Normal appearance. She is well-developed.   HENT:      Head: Normocephalic and atraumatic.   Eyes:      General: Lids are normal.      Extraocular Movements: Extraocular movements intact.      Conjunctiva/sclera: Conjunctivae normal.      Pupils: Pupils are equal, round, and reactive to light.   Neck:      Thyroid: No thyroid mass or thyromegaly.      Vascular: No carotid bruit.      Trachea: Trachea normal. No tracheal deviation.   Cardiovascular:      Rate and Rhythm: Normal rate and regular rhythm.      Heart sounds: Normal heart sounds. No murmur heard.   No friction rub. No gallop.    Pulmonary:      Effort: Pulmonary effort is normal. No respiratory distress.      Breath sounds: Normal breath sounds. No wheezing.   Musculoskeletal:         General: No deformity. Normal range of motion.      Cervical back: Normal range of motion and neck supple.   Lymphadenopathy:      Cervical: No cervical adenopathy.   Skin:     General: Skin is warm and dry.      Findings: No erythema or rash.      Nails: There is no clubbing.   Neurological:      Mental Status: She is alert and oriented to person, place, and time.      Cranial Nerves: No cranial nerve deficit.      Deep Tendon Reflexes: Reflexes are normal and symmetric. Reflexes normal.   Psychiatric:         Speech: Speech normal.         Behavior: Behavior normal.         Thought Content: Thought content normal.         Judgment: Judgment normal.       Results for orders placed or performed in visit on 03/05/21   Comprehensive Metabolic Panel    Specimen: Blood   Result Value Ref Range "    Glucose 87 65 - 99 mg/dL    BUN 11 8 - 23 mg/dL    Creatinine 0.76 0.57 - 1.00 mg/dL    Sodium 140 136 - 145 mmol/L    Potassium 4.1 3.5 - 5.2 mmol/L    Chloride 106 98 - 107 mmol/L    CO2 27.1 22.0 - 29.0 mmol/L    Calcium 8.9 8.6 - 10.5 mg/dL    Total Protein 6.6 6.0 - 8.5 g/dL    Albumin 4.10 3.50 - 5.20 g/dL    ALT (SGPT) 25 1 - 33 U/L    AST (SGOT) 23 1 - 32 U/L    Alkaline Phosphatase 64 39 - 117 U/L    Total Bilirubin 0.2 0.0 - 1.2 mg/dL    eGFR Non African Amer 76 >60 mL/min/1.73    Globulin 2.5 gm/dL    A/G Ratio 1.6 g/dL    BUN/Creatinine Ratio 14.5 7.0 - 25.0    Anion Gap 6.9 5.0 - 15.0 mmol/L   CBC Auto Differential    Specimen: Blood   Result Value Ref Range    WBC 4.68 3.40 - 10.80 10*3/mm3    RBC 4.24 3.77 - 5.28 10*6/mm3    Hemoglobin 12.3 12.0 - 15.9 g/dL    Hematocrit 37.7 34.0 - 46.6 %    MCV 88.9 79.0 - 97.0 fL    MCH 29.0 26.6 - 33.0 pg    MCHC 32.6 31.5 - 35.7 g/dL    RDW 12.5 12.3 - 15.4 %    RDW-SD 40.4 37.0 - 54.0 fl    MPV 11.5 6.0 - 12.0 fL    Platelets 205 140 - 450 10*3/mm3    Neutrophil % 61.7 42.7 - 76.0 %    Lymphocyte % 21.4 19.6 - 45.3 %    Monocyte % 12.4 (H) 5.0 - 12.0 %    Eosinophil % 3.2 0.3 - 6.2 %    Basophil % 1.1 0.0 - 1.5 %    Immature Grans % 0.2 0.0 - 0.5 %    Neutrophils, Absolute 2.89 1.70 - 7.00 10*3/mm3    Lymphocytes, Absolute 1.00 0.70 - 3.10 10*3/mm3    Monocytes, Absolute 0.58 0.10 - 0.90 10*3/mm3    Eosinophils, Absolute 0.15 0.00 - 0.40 10*3/mm3    Basophils, Absolute 0.05 0.00 - 0.20 10*3/mm3    Immature Grans, Absolute 0.01 0.00 - 0.05 10*3/mm3    nRBC 0.0 0.0 - 0.2 /100 WBC     Diagnoses and all orders for this visit:    1. Avitaminosis D (Primary)  Assessment & Plan:  Continue supplement   Update levels     Orders:  -     Vitamin D 25 Hydroxy    2. Benign essential hypertension  Assessment & Plan:  bp is good / well controlled  Continue lisinopril    Orders:  -     Comprehensive Metabolic Panel  -     Lipid Panel    3. Acquired hypothyroidism  Assessment  & Plan:  Doing well overall  Continue synthroid 88 mcg daily   Check tfts     Orders:  -     TSH    4. Mixed hyperlipidemia  Assessment & Plan:  With low cor ca score but left main location discussed zocor every other day       Other orders  -     lisinopril (PRINIVIL,ZESTRIL) 10 MG tablet; Take 1 tablet by mouth Daily.  Dispense: 30 tablet; Refill: 5  Return in about 6 months (around 1/6/2022) for Recheck.    Tierney Mata MD  Signed Tierney Mata MD

## 2021-07-07 LAB — 25(OH)D3 SERPL-MCNC: 37.9 NG/ML

## 2021-07-16 RX ORDER — LEVOTHYROXINE SODIUM 0.07 MG/1
75 TABLET ORAL DAILY
Qty: 90 TABLET | Refills: 1 | Status: SHIPPED | OUTPATIENT
Start: 2021-07-16 | End: 2022-01-10 | Stop reason: RX

## 2021-07-27 ENCOUNTER — SPECIALTY PHARMACY (OUTPATIENT)
Dept: ONCOLOGY | Facility: HOSPITAL | Age: 66
End: 2021-07-27

## 2021-07-27 RX ORDER — RENAGEL 800 MG/1
14 TABLET ORAL DAILY
Qty: 30 TABLET | Refills: 11 | Status: SHIPPED | OUTPATIENT
Start: 2021-07-27 | End: 2022-08-08

## 2021-08-20 RX ORDER — SIMVASTATIN 20 MG
TABLET ORAL
Qty: 30 TABLET | Refills: 0 | Status: SHIPPED | OUTPATIENT
Start: 2021-08-20 | End: 2022-08-08 | Stop reason: SDUPTHER

## 2021-08-23 ENCOUNTER — TELEPHONE (OUTPATIENT)
Dept: NEUROLOGY | Facility: CLINIC | Age: 66
End: 2021-08-23

## 2021-08-23 NOTE — TELEPHONE ENCOUNTER
"Subjective:     Chief Complaint   Patient presents with   • Back Pain     mid upper back     Lebron Mcdonough is a 27 y.o. male established patient here to discuss concerns with upper back pain. Reports that on January 23rd he slipped on ice, landing on his back. He had significant back pain at that time which has gradually improved but not resolved. At this point he continues to have bilateral upper back pain, between the shoulders which ranges from a 2 out of 10-7 out of 10 depending on activity level. Worse at the end of a workday when his been standing quite a bit, also exacerbated by exercises like pushups, somewhat relieved with sitting or laying down. He is not taking any medication or trying any treatment for this  No radiation in the arms or legs, no numbness, tingling, weakness in extremities. No bowel or bladder function change  Current medicines (including changes today)  Current Outpatient Prescriptions   Medication Sig Dispense Refill   • ondansetron (ZOFRAN ODT) 4 MG TABLET DISPERSIBLE Take 1 Tab by mouth every 8 hours as needed for Nausea/Vomiting. 10 Tab 0   • citalopram (CELEXA) 10 MG tablet Take 1 Tab by mouth every day. 30 Tab 11     No current facility-administered medications for this visit.     He  has no past medical history of Asthma, Hyperlipidemia, or Hypertension.    ROS included above     Objective:     Blood pressure 122/64, pulse 68, temperature 36.7 °C (98.1 °F), resp. rate 16, height 1.905 m (6' 3\"), weight 104.327 kg (230 lb), SpO2 99 %. Body mass index is 28.75 kg/(m^2).     Physical Exam:  General: Alert, oriented in no acute distress.  Eye contact is good, speech is normal, affect calm  Lungs: clear to auscultation bilaterally, normal effort, no wheeze/ rhonchi/ rales.  CV: regular rate and rhythm, S1, S2, no murmur  MS: Strength is 5 out of 5 globally. No point tenderness over the spine, no obvious deformity. No hypertonicity. Normal gait  Ext: no edema, color normal, vascularity " Caller: Nicky Owens    Relationship: Self    Best call back number: [phone]    What was the call regarding: PT CALLED STATING THAT INSURANCE IS REQUIRING AN UPDATED PA FOR AUBAGIO MEDICATION AS PREVIOUS PA  ON 21. PT STATES SHE ONLY HAS 2 TABLETS LEFT AND WILL LEAVING TOWN ON Wednesday, 21. PT DOES NOT HAVE ENOUGH MEDICATION TO TAKE WITH HER OUT OF TOWN AND NEEDS THIS MATTER ESCALATED AS QUICKLY AS POSSIBLE TO ENSURE SHE IS NOT GOING WITHOUT THE MEDICATION.    Preferred pharmacy: [facility] [facility] - [phone] [facility] [phone] FX    Do you require a callback: YES, PLEASE.    PLEASE REVIEW AND ADVISE.       normal, temperature normal    Assessment and Plan:   The following treatment plan was discussed   1. Upper back pain   muscular strain. Printed exercises given, encouraged regular stretching and use of massage ball, topical analgesic, ibuprofen, ice or heat as needed. Small amount of Flexeril provided which he may use the evening prior to bed if it's particularly sore. Consider physical therapy if not improving in another 2-3 weeks, he will let me know        Followup: As needed         Please note that this dictation was created using voice recognition software. I have worked with consultants from the vendor as well as technical experts from [employer] to optimize the interface. I have made every reasonable attempt to correct obvious errors, but I expect that there are errors of grammar and possibly content that I did not discover before finalizing the note.

## 2021-08-24 NOTE — TELEPHONE ENCOUNTER
Provider: JONATHAN  Caller: PT  Relationship to Patient: SELF  Pharmacy: [facility]  Phone Number: [phone]  Reason for Call: PT IS FOLLOWING UP RE: HER PRE AUTH; PT IS GOING OUT OF TOWN 8/25/21 AND IS MOST ANXIOUS TO GET HER RX.    PT DID CALL [facility] THIS MORNING AND WAS TOLD, AGAIN, THAT THIS HAS NOT YET BEEN AUTHORIZED.    PLEASE ADVISE.    THANK YOU.

## 2021-09-07 ENCOUNTER — TELEPHONE (OUTPATIENT)
Dept: NEUROLOGY | Facility: CLINIC | Age: 66
End: 2021-09-07

## 2021-09-07 NOTE — TELEPHONE ENCOUNTER
Provider: DR. CASTILLO   Caller: RENZO   Relationship to Patient: PT   Phone Number: [phone]  Reason for Call: PT CALLED WANTING TO CHANGE APPT TO LATER TIME ON Friday. Friday APPT NOTES STATES [facility]. CAN PT COME IN LATER IN THE DAY OR CAN SOMEONE R/S [facility] APPT AS [employer] DOES NOT ALLOW CANCELLING OR R/S OF THESE. THANK YOU

## 2021-09-07 NOTE — TELEPHONE ENCOUNTER
Called patient to let her know that we did not have any openings later in the day on Friday with Dr. Donovan. I did let her know that our APRNs would have a sooner appointment. Patient said she would call me back and let me know if she can adjust the conflict she has. Advised her to ask for me when she calls back.

## 2021-09-09 ENCOUNTER — TELEPHONE (OUTPATIENT)
Dept: NEUROLOGY | Facility: OTHER | Age: 66
End: 2021-09-09

## 2021-09-09 NOTE — TELEPHONE ENCOUNTER
JAIME HARRISON CALLED TO CONFIRM PT'S APPT FOR 10/19/21 AND TO CHECK TO SEE IF HER PRESCRIPTION FOR AUBAGIO WAS STILL CURRENT.  PT HAD CANCELLED 09/10/21 APPT.

## 2021-10-11 ENCOUNTER — IMMUNIZATION (OUTPATIENT)
Dept: VACCINE CLINIC | Facility: HOSPITAL | Age: 66
End: 2021-10-11

## 2021-10-11 PROCEDURE — 91300 HC SARSCOV02 VAC 30MCG/0.3ML IM: CPT | Performed by: INTERNAL MEDICINE

## 2021-10-11 PROCEDURE — 0003A: CPT | Performed by: INTERNAL MEDICINE

## 2021-10-11 PROCEDURE — 0004A ADM SARSCOV2 30MCG/0.3ML BOOSTER: CPT | Performed by: INTERNAL MEDICINE

## 2021-10-19 ENCOUNTER — OFFICE VISIT (OUTPATIENT)
Dept: NEUROLOGY | Facility: CLINIC | Age: 66
End: 2021-10-19

## 2021-10-19 VITALS
HEART RATE: 75 BPM | OXYGEN SATURATION: 100 % | WEIGHT: 142 LBS | DIASTOLIC BLOOD PRESSURE: 66 MMHG | TEMPERATURE: 97.1 F | SYSTOLIC BLOOD PRESSURE: 122 MMHG | BODY MASS INDEX: 26.13 KG/M2 | HEIGHT: 62 IN

## 2021-10-19 DIAGNOSIS — G35 MULTIPLE SCLEROSIS (HCC): Primary | Chronic | ICD-10-CM

## 2021-10-19 DIAGNOSIS — G25.0 ESSENTIAL TREMOR: Chronic | ICD-10-CM

## 2021-10-19 DIAGNOSIS — F32.9 MAJOR DEPRESSIVE DISORDER WITH CURRENT ACTIVE EPISODE, UNSPECIFIED DEPRESSION EPISODE SEVERITY, UNSPECIFIED WHETHER RECURRENT: Chronic | ICD-10-CM

## 2021-10-19 PROCEDURE — 99214 OFFICE O/P EST MOD 30 MIN: CPT | Performed by: PSYCHIATRY & NEUROLOGY

## 2021-10-19 RX ORDER — LEVOTHYROXINE SODIUM 88 UG/1
TABLET ORAL
COMMUNITY
Start: 2021-10-16 | End: 2021-11-18

## 2021-10-19 NOTE — PROGRESS NOTES
"Chief Complaint    Multiple Sclerosis    Subjective          Nicky Owens presents to [facility] NEUROLOGY     History of Present Illness    65 y.o. female returns in follow up.  Last visit on 5/7/21 continued Aubagio, Toviaz, Wellbutrin.      MRI Brain 8/28/20 as compared to 3/28/19 as compared to 12/26/17,  Mild T2 lesion load, no new, enlarging or enhancing lesions       7/6/21 Vit D, CMP, TSH NCS      MDD     Mood is stable       RRMS     Starting to have hair loss.      L UE and LE paresthesias miild intensity.     Fatigue intermittently present.         Essential Tremor     Tremor controlled     Urinary urgency      Toviaz improving frequency and urgrency          Objective   Vital Signs:   /66   Pulse 75   Temp 97.1 °F (36.2 °C)   Ht 157.5 cm (62.01\")   Wt 64.4 kg (142 lb)   SpO2 100%   BMI 25.97 kg/m²     Physical Exam  Eyes:      Extraocular Movements: EOM normal.      Pupils: Pupils are equal, round, and reactive to light.   Neurological:      Mental Status: She is oriented to person, place, and time.      Gait: Gait is intact.      Deep Tendon Reflexes: Strength normal.   Psychiatric:         Speech: Speech normal.          Neurologic Exam     Mental Status   Oriented to person, place, and time.   Speech: speech is normal   Level of consciousness: alert  Knowledge: good and consistent with education.   Normal comprehension.     Cranial Nerves   Cranial nerves II through XII intact.     CN II   Visual fields full to confrontation.   Visual acuity: normal  Right visual field deficit: none  Left visual field deficit: none     CN III, IV, VI   Pupils are equal, round, and reactive to light.  Extraocular motions are normal.   Nystagmus: none   Diplopia: none  Ophthalmoparesis: none  Upgaze: normal  Downgaze: normal  Conjugate gaze: present    CN V   Facial sensation intact.   Right corneal reflex: normal  Left corneal reflex: normal    CN VII   Right facial weakness: " none  Left facial weakness: none    CN VIII   Hearing: intact    CN IX, X   Palate: symmetric  Right gag reflex: normal  Left gag reflex: normal    CN XI   Right sternocleidomastoid strength: normal  Left sternocleidomastoid strength: normal    CN XII   Tongue: not atrophic  Fasciculations: absent  Tongue deviation: none    Motor Exam   Muscle bulk: normal  Overall muscle tone: normal    Strength   Strength 5/5 throughout.     Sensory Exam   Light touch normal.     Gait, Coordination, and Reflexes     Gait  Gait: normal    Tremor   Resting tremor: absent  Intention tremor: absent  Action tremor: absent    Reflexes   Reflexes 2+ except as noted.      Result Review :   The following data was reviewed by: Jorge L Donovan MD on 10/19/2021:  Common labs    Common Labsle 1/4/21 1/4/21 1/4/21 3/5/21 3/5/21 7/6/21 7/6/21    1053 1053 1053 1352 1352 1113 1113   Glucose 88   87  101 (A)    BUN 15   11  11    Creatinine 0.76   0.76  0.72    eGFR Non  Am 76   76  81    Sodium 142   140  141    Potassium 4.1   4.1  3.8    Chloride 107   106  106    Calcium 8.7   8.9  8.9    Albumin 4.20   4.10  4.10    Total Bilirubin 0.3   0.2  0.4    Alkaline Phosphatase 69   64  87    AST (SGOT) 20   23  19    ALT (SGPT) 18   25  29    WBC  4.45   4.68     Hemoglobin  12.6   12.3     Hematocrit  40.0   37.7     Platelets  187   205     Total Cholesterol   166    260 (A)   Triglycerides   86    140   HDL Cholesterol   48    41   LDL Cholesterol    102 (A)    193 (A)   (A) Abnormal value                      Assessment and Plan    Diagnoses and all orders for this visit:    1. Multiple sclerosis (HCC) (Primary)  Assessment & Plan:  Sx stable on Aubagio     CBC,CMP, TSH    Orders:  -     CBC & Differential; Future  -     Comprehensive Metabolic Panel; Future  -     TSH; Future    2. Essential tremor  Assessment & Plan:  Sx stable off meds        3. Major depressive disorder with current active episode, unspecified depression episode  severity, unspecified whether recurrent  Assessment & Plan:  Patient's depression is recurrent and is mild without psychosis. Their depression is currently in partial remission and the condition is improving with treatment. This will be reassessed at the next regular appointment. F/U as described:patient will continue current medication therapy.        Follow Up   No follow-ups on file.  Patient was given instructions and counseling regarding her condition or for health maintenance advice. Please see specific information pulled into the AVS if appropriate.

## 2021-10-22 ENCOUNTER — LAB (OUTPATIENT)
Dept: LAB | Facility: HOSPITAL | Age: 66
End: 2021-10-22

## 2021-10-22 DIAGNOSIS — G35 MULTIPLE SCLEROSIS (HCC): ICD-10-CM

## 2021-10-22 LAB
BASOPHILS # BLD AUTO: 0.07 10*3/MM3 (ref 0–0.2)
BASOPHILS NFR BLD AUTO: 1.2 % (ref 0–1.5)
DEPRECATED RDW RBC AUTO: 39.7 FL (ref 37–54)
EOSINOPHIL # BLD AUTO: 0.21 10*3/MM3 (ref 0–0.4)
EOSINOPHIL NFR BLD AUTO: 3.7 % (ref 0.3–6.2)
ERYTHROCYTE [DISTWIDTH] IN BLOOD BY AUTOMATED COUNT: 12.2 % (ref 12.3–15.4)
HCT VFR BLD AUTO: 40.8 % (ref 34–46.6)
HGB BLD-MCNC: 13.5 G/DL (ref 12–15.9)
IMM GRANULOCYTES # BLD AUTO: 0.03 10*3/MM3 (ref 0–0.05)
IMM GRANULOCYTES NFR BLD AUTO: 0.5 % (ref 0–0.5)
LYMPHOCYTES # BLD AUTO: 1.28 10*3/MM3 (ref 0.7–3.1)
LYMPHOCYTES NFR BLD AUTO: 22.7 % (ref 19.6–45.3)
MCH RBC QN AUTO: 29.5 PG (ref 26.6–33)
MCHC RBC AUTO-ENTMCNC: 33.1 G/DL (ref 31.5–35.7)
MCV RBC AUTO: 89.1 FL (ref 79–97)
MONOCYTES # BLD AUTO: 0.79 10*3/MM3 (ref 0.1–0.9)
MONOCYTES NFR BLD AUTO: 14 % (ref 5–12)
NEUTROPHILS NFR BLD AUTO: 3.27 10*3/MM3 (ref 1.7–7)
NEUTROPHILS NFR BLD AUTO: 57.9 % (ref 42.7–76)
NRBC BLD AUTO-RTO: 0.2 /100 WBC (ref 0–0.2)
PLATELET # BLD AUTO: 232 10*3/MM3 (ref 140–450)
PMV BLD AUTO: 11.6 FL (ref 6–12)
RBC # BLD AUTO: 4.58 10*6/MM3 (ref 3.77–5.28)
WBC # BLD AUTO: 5.65 10*3/MM3 (ref 3.4–10.8)

## 2021-10-22 PROCEDURE — 80053 COMPREHEN METABOLIC PANEL: CPT

## 2021-10-22 PROCEDURE — 84443 ASSAY THYROID STIM HORMONE: CPT

## 2021-10-22 PROCEDURE — 85025 COMPLETE CBC W/AUTO DIFF WBC: CPT

## 2021-10-22 PROCEDURE — 36415 COLL VENOUS BLD VENIPUNCTURE: CPT

## 2021-10-23 LAB
ALBUMIN SERPL-MCNC: 4.5 G/DL (ref 3.5–5.2)
ALBUMIN/GLOB SERPL: 1.7 G/DL
ALP SERPL-CCNC: 88 U/L (ref 39–117)
ALT SERPL W P-5'-P-CCNC: 18 U/L (ref 1–33)
ANION GAP SERPL CALCULATED.3IONS-SCNC: 6 MMOL/L (ref 5–15)
AST SERPL-CCNC: 22 U/L (ref 1–32)
BILIRUB SERPL-MCNC: 0.3 MG/DL (ref 0–1.2)
BUN SERPL-MCNC: 11 MG/DL (ref 8–23)
BUN/CREAT SERPL: 14.1 (ref 7–25)
CALCIUM SPEC-SCNC: 9.4 MG/DL (ref 8.6–10.5)
CHLORIDE SERPL-SCNC: 104 MMOL/L (ref 98–107)
CO2 SERPL-SCNC: 28 MMOL/L (ref 22–29)
CREAT SERPL-MCNC: 0.78 MG/DL (ref 0.57–1)
GFR SERPL CREATININE-BSD FRML MDRD: 74 ML/MIN/1.73
GLOBULIN UR ELPH-MCNC: 2.6 GM/DL
GLUCOSE SERPL-MCNC: 88 MG/DL (ref 65–99)
POTASSIUM SERPL-SCNC: 4.1 MMOL/L (ref 3.5–5.2)
PROT SERPL-MCNC: 7.1 G/DL (ref 6–8.5)
SODIUM SERPL-SCNC: 138 MMOL/L (ref 136–145)
TSH SERPL DL<=0.05 MIU/L-ACNC: 0.38 UIU/ML (ref 0.27–4.2)

## 2021-11-18 RX ORDER — LEVOTHYROXINE SODIUM 88 UG/1
TABLET ORAL
Qty: 30 TABLET | Refills: 3 | Status: SHIPPED | OUTPATIENT
Start: 2021-11-18 | End: 2022-03-18

## 2022-01-10 ENCOUNTER — OFFICE VISIT (OUTPATIENT)
Dept: ENDOCRINOLOGY | Facility: CLINIC | Age: 67
End: 2022-01-10

## 2022-01-10 ENCOUNTER — LAB (OUTPATIENT)
Dept: LAB | Facility: HOSPITAL | Age: 67
End: 2022-01-10

## 2022-01-10 VITALS
SYSTOLIC BLOOD PRESSURE: 138 MMHG | HEIGHT: 62 IN | BODY MASS INDEX: 26.54 KG/M2 | WEIGHT: 144.2 LBS | DIASTOLIC BLOOD PRESSURE: 78 MMHG | HEART RATE: 78 BPM | OXYGEN SATURATION: 100 %

## 2022-01-10 DIAGNOSIS — E03.9 ACQUIRED HYPOTHYROIDISM: ICD-10-CM

## 2022-01-10 DIAGNOSIS — I10 BENIGN ESSENTIAL HYPERTENSION: Primary | ICD-10-CM

## 2022-01-10 DIAGNOSIS — E78.2 MIXED HYPERLIPIDEMIA: ICD-10-CM

## 2022-01-10 LAB
ALBUMIN SERPL-MCNC: 4.3 G/DL (ref 3.5–5.2)
ALBUMIN/GLOB SERPL: 2 G/DL
ALP SERPL-CCNC: 83 U/L (ref 39–117)
ALT SERPL W P-5'-P-CCNC: 27 U/L (ref 1–33)
ANION GAP SERPL CALCULATED.3IONS-SCNC: 5.8 MMOL/L (ref 5–15)
AST SERPL-CCNC: 21 U/L (ref 1–32)
BILIRUB SERPL-MCNC: 0.3 MG/DL (ref 0–1.2)
BUN SERPL-MCNC: 13 MG/DL (ref 8–23)
BUN/CREAT SERPL: 16.5 (ref 7–25)
CALCIUM SPEC-SCNC: 9.2 MG/DL (ref 8.6–10.5)
CHLORIDE SERPL-SCNC: 105 MMOL/L (ref 98–107)
CHOLEST SERPL-MCNC: 245 MG/DL (ref 0–200)
CO2 SERPL-SCNC: 28.2 MMOL/L (ref 22–29)
CREAT SERPL-MCNC: 0.79 MG/DL (ref 0.57–1)
GFR SERPL CREATININE-BSD FRML MDRD: 73 ML/MIN/1.73
GLOBULIN UR ELPH-MCNC: 2.2 GM/DL
GLUCOSE SERPL-MCNC: 84 MG/DL (ref 65–99)
HDLC SERPL-MCNC: 44 MG/DL (ref 40–60)
LDLC SERPL CALC-MCNC: 181 MG/DL (ref 0–100)
LDLC/HDLC SERPL: 4.06 {RATIO}
POTASSIUM SERPL-SCNC: 4.2 MMOL/L (ref 3.5–5.2)
PROT SERPL-MCNC: 6.5 G/DL (ref 6–8.5)
SODIUM SERPL-SCNC: 139 MMOL/L (ref 136–145)
T4 FREE SERPL-MCNC: 1.27 NG/DL (ref 0.93–1.7)
TRIGL SERPL-MCNC: 112 MG/DL (ref 0–150)
TSH SERPL DL<=0.05 MIU/L-ACNC: 2.16 UIU/ML (ref 0.27–4.2)
VLDLC SERPL-MCNC: 20 MG/DL (ref 5–40)

## 2022-01-10 PROCEDURE — 82306 VITAMIN D 25 HYDROXY: CPT | Performed by: INTERNAL MEDICINE

## 2022-01-10 PROCEDURE — 84439 ASSAY OF FREE THYROXINE: CPT | Performed by: INTERNAL MEDICINE

## 2022-01-10 PROCEDURE — 80053 COMPREHEN METABOLIC PANEL: CPT | Performed by: INTERNAL MEDICINE

## 2022-01-10 PROCEDURE — 99214 OFFICE O/P EST MOD 30 MIN: CPT | Performed by: INTERNAL MEDICINE

## 2022-01-10 PROCEDURE — 80061 LIPID PANEL: CPT | Performed by: INTERNAL MEDICINE

## 2022-01-10 PROCEDURE — 84443 ASSAY THYROID STIM HORMONE: CPT | Performed by: INTERNAL MEDICINE

## 2022-01-10 NOTE — ASSESSMENT & PLAN NOTE
Taking synthroid 88 mcg but feels like she is overreplaced  Pharmacy refilled but did not give her 75 mcg  Check tfts and reduce dose prn

## 2022-01-10 NOTE — PROGRESS NOTES
Nicky Yeh Graciela 66 y.o.  [facility]:Follow-up, Hypertension, Hypothyroidism (pt is not taking levothyroxine 75mcg of no refills so she went back on 88mcg x 2-3 months), Hyperlipidemia, Multiple Sclerosis, and Vitamin D Deficiency      [place]: Follow-up, Hypertension, Hypothyroidism (pt is not taking levothyroxine 75mcg of no refills so she went back on 88mcg x 2-3 months), Hyperlipidemia, Multiple Sclerosis, and Vitamin D Deficiency    bp is good overall  We reduced synthroid to 75 mcg daily   She took this for 1 month and then when she went in for refill the pharmacy refilled the 88 mcg  She has been on this dose since 5 months ago   Expecting grandchild- stress     Allergies   Allergen Reactions   • Penicillins        Current Outpatient Medications:   •  levothyroxine (SYNTHROID, LEVOTHROID) 88 MCG tablet, TAKE ONE TABLET BY MOUTH DAILY, Disp: 30 tablet, Rfl: 3  •  buPROPion XL (WELLBUTRIN XL) 150 MG 24 hr tablet, Take 1 tablet by mouth Every Morning., Disp: 90 tablet, Rfl: 3  •  estradiol (MINIVELLE, VIVELLE-DOT) 0.05 MG/24HR patch, Place 1 patch on the skin 1 (One) Time Per Week., Disp: , Rfl:   •  lisinopril (PRINIVIL,ZESTRIL) 10 MG tablet, Take 1 tablet by mouth Daily., Disp: 30 tablet, Rfl: 5  •  simvastatin (ZOCOR) 20 MG tablet, TAKE ONE TABLET BY MOUTH DAILY (Patient taking differently: Takes a couple of times a week), Disp: 30 tablet, Rfl: 0  •  Teriflunomide (Aubagio) 14 MG tablet, Take 14 mg by mouth Daily., Disp: 30 tablet, Rfl: 11  •  TOVIAZ 4 MG tablet sustained-release 24 hour tablet, , Disp: , Rfl:   •  vitamin D (ERGOCALCIFEROL) 1.25 MG (03808 UT) capsule capsule, Take 1 capsule by mouth Every 7 (Seven) Days., Disp: 4 capsule, Rfl: 5  Patient Active Problem List    Diagnosis    • Major depressive disorder with current active episode [F32.9]    • Urinary urgency [R39.15]    • Bruising [T14.8XXA]    • Acute gastritis [K29.00]    • Abdominal pain RUQ [R10.9]    • Visit for screening mammogram [Z12.31]     • Anemia [D64.9]    • Anorexia [R63.0]    • Anxiety [F41.9]    • Benign essential hypertension [I10]    • Bone cyst [M85.60]    • H/O colonoscopy [Z98.890]    • Degeneration of intervertebral disc of lumbar region [M51.36]    • Dysphagia [R13.10]    • Erosive esophagitis [K22.10]    • Essential tremor [G25.0]    • Hyperlipidemia [E78.5]    • Hyperreflexia [R29.2]    • Hypothyroidism [E03.9]    • Multiple sclerosis (HCC) [G35]    • Heart murmur [R01.1]    • Muscle weakness [M62.81]    • Nausea [R11.0]    • Nonallopathic lesion of cervical region [M99.9]    • Osteoporosis [M81.0]    • Shoulder pain [M25.519]    • Avitaminosis D [E55.9]    • Cobalamin deficiency [E53.8]      Review of Systems   Constitutional: Negative for activity change, appetite change and unexpected weight change.   HENT: Negative for congestion and rhinorrhea.    Eyes: Negative for visual disturbance.   Respiratory: Negative for cough and shortness of breath.    Cardiovascular: Negative for palpitations and leg swelling.   Gastrointestinal: Negative for constipation, diarrhea and nausea.   Genitourinary: Negative for hematuria.   Musculoskeletal: Negative for arthralgias, back pain, gait problem, joint swelling and myalgias.   Skin: Negative for color change, rash and wound.   Allergic/Immunologic: Negative for environmental allergies, food allergies and immunocompromised state.   Neurological: Negative for dizziness, weakness and light-headedness.   Psychiatric/Behavioral: Negative for confusion, decreased concentration, dysphoric mood and sleep disturbance. The patient is nervous/anxious.      Social History     Socioeconomic History   • Marital status:    Tobacco Use   • Smoking status: Never Smoker   • Smokeless tobacco: Never Used   Vaping Use   • Vaping Use: Never used   Substance and Sexual Activity   • Alcohol use: No   • Drug use: No   • Sexual activity: Defer     Family History   Problem Relation Age of Onset   • Heart disease  "Mother    • Hypertension Mother    • Heart disease Father    • Hypertension Father    • Hypertension Sister    • Other Sister         malignant neoplasm   • Mental illness Sister    • Stroke Brother    • Hypertension Brother    • Mental illness Brother    • Alzheimer's disease Other         Aunt     /78   Pulse 78   Ht 157.5 cm (62\")   Wt 65.4 kg (144 lb 3.2 oz)   SpO2 100%   BMI 26.37 kg/m²   Physical Exam  Vitals and nursing note reviewed.   Constitutional:       Appearance: Normal appearance. She is well-developed.   HENT:      Head: Normocephalic and atraumatic.   Eyes:      General: Lids are normal.      Extraocular Movements: Extraocular movements intact.      Conjunctiva/sclera: Conjunctivae normal.      Pupils: Pupils are equal, round, and reactive to light.   Neck:      Thyroid: No thyroid mass or thyromegaly.      Vascular: No carotid bruit.      Trachea: Trachea normal. No tracheal deviation.   Cardiovascular:      Rate and Rhythm: Normal rate and regular rhythm.      Pulses: Normal pulses.      Heart sounds: Normal heart sounds. No murmur heard.  No friction rub. No gallop.    Pulmonary:      Effort: Pulmonary effort is normal. No respiratory distress.      Breath sounds: Normal breath sounds. No wheezing.   Musculoskeletal:         General: No deformity. Normal range of motion.      Cervical back: Normal range of motion and neck supple.   Lymphadenopathy:      Cervical: No cervical adenopathy.   Skin:     General: Skin is warm and dry.      Findings: No erythema or rash.      Nails: There is no clubbing.   Neurological:      General: No focal deficit present.      Mental Status: She is alert and oriented to person, place, and time.      Cranial Nerves: No cranial nerve deficit.      Deep Tendon Reflexes: Reflexes are normal and symmetric. Reflexes normal.   Psychiatric:         Mood and Affect: Mood normal.         Speech: Speech normal.         Behavior: Behavior normal.         Thought " Content: Thought content normal.         Judgment: Judgment normal.       Results for orders placed or performed in visit on 10/22/21   Comprehensive Metabolic Panel    Specimen: Blood   Result Value Ref Range    Glucose 88 65 - 99 mg/dL    BUN 11 8 - 23 mg/dL    Creatinine 0.78 0.57 - 1.00 mg/dL    Sodium 138 136 - 145 mmol/L    Potassium 4.1 3.5 - 5.2 mmol/L    Chloride 104 98 - 107 mmol/L    CO2 28.0 22.0 - 29.0 mmol/L    Calcium 9.4 8.6 - 10.5 mg/dL    Total Protein 7.1 6.0 - 8.5 g/dL    Albumin 4.50 3.50 - 5.20 g/dL    ALT (SGPT) 18 1 - 33 U/L    AST (SGOT) 22 1 - 32 U/L    Alkaline Phosphatase 88 39 - 117 U/L    Total Bilirubin 0.3 0.0 - 1.2 mg/dL    eGFR Non African Amer 74 >60 mL/min/1.73    Globulin 2.6 gm/dL    A/G Ratio 1.7 g/dL    BUN/Creatinine Ratio 14.1 7.0 - 25.0    Anion Gap 6.0 5.0 - 15.0 mmol/L   TSH    Specimen: Blood   Result Value Ref Range    TSH 0.379 0.270 - 4.200 uIU/mL   CBC Auto Differential    Specimen: Blood   Result Value Ref Range    WBC 5.65 3.40 - 10.80 10*3/mm3    RBC 4.58 3.77 - 5.28 10*6/mm3    Hemoglobin 13.5 12.0 - 15.9 g/dL    Hematocrit 40.8 34.0 - 46.6 %    MCV 89.1 79.0 - 97.0 fL    MCH 29.5 26.6 - 33.0 pg    MCHC 33.1 31.5 - 35.7 g/dL    RDW 12.2 (L) 12.3 - 15.4 %    RDW-SD 39.7 37.0 - 54.0 fl    MPV 11.6 6.0 - 12.0 fL    Platelets 232 140 - 450 10*3/mm3    Neutrophil % 57.9 42.7 - 76.0 %    Lymphocyte % 22.7 19.6 - 45.3 %    Monocyte % 14.0 (H) 5.0 - 12.0 %    Eosinophil % 3.7 0.3 - 6.2 %    Basophil % 1.2 0.0 - 1.5 %    Immature Grans % 0.5 0.0 - 0.5 %    Neutrophils, Absolute 3.27 1.70 - 7.00 10*3/mm3    Lymphocytes, Absolute 1.28 0.70 - 3.10 10*3/mm3    Monocytes, Absolute 0.79 0.10 - 0.90 10*3/mm3    Eosinophils, Absolute 0.21 0.00 - 0.40 10*3/mm3    Basophils, Absolute 0.07 0.00 - 0.20 10*3/mm3    Immature Grans, Absolute 0.03 0.00 - 0.05 10*3/mm3    nRBC 0.2 0.0 - 0.2 /100 WBC     Diagnoses and all orders for this visit:    1. Benign essential hypertension  (Primary)  Assessment & Plan:  bp is well controlled  Continue medication and monitoring     Orders:  -     Comprehensive Metabolic Panel  -     Vitamin D 25 Hydroxy    2. Mixed hyperlipidemia  Assessment & Plan:  Is eating low fat diet- taking zocor 20 mg twice weekly  Update flp     Orders:  -     Lipid Panel    3. Acquired hypothyroidism  Assessment & Plan:  Taking synthroid 88 mcg but feels like she is overreplaced  Pharmacy refilled but did not give her 75 mcg  Check tfts and reduce dose prn     Orders:  -     TSH  -     T4, Free  Return in about 6 months (around 7/10/2022) for Recheck.    Tierney Mata MD  Signed Tierney Mata MD

## 2022-01-11 LAB — 25(OH)D3 SERPL-MCNC: 51.6 NG/ML (ref 30–100)

## 2022-01-17 RX ORDER — LISINOPRIL 10 MG/1
TABLET ORAL
Qty: 30 TABLET | Refills: 5 | Status: SHIPPED | OUTPATIENT
Start: 2022-01-17 | End: 2022-07-18

## 2022-01-31 ENCOUNTER — DOCUMENTATION (OUTPATIENT)
Dept: ONCOLOGY | Facility: HOSPITAL | Age: 67
End: 2022-01-31

## 2022-02-14 ENCOUNTER — TRANSCRIBE ORDERS (OUTPATIENT)
Dept: ADMINISTRATIVE | Facility: HOSPITAL | Age: 67
End: 2022-02-14

## 2022-02-14 ENCOUNTER — HOSPITAL ENCOUNTER (OUTPATIENT)
Dept: GENERAL RADIOLOGY | Facility: HOSPITAL | Age: 67
Discharge: HOME | End: 2022-02-14
Admitting: PHYSICIAN ASSISTANT

## 2022-02-14 DIAGNOSIS — M25.512 ACUTE PAIN OF LEFT SHOULDER: ICD-10-CM

## 2022-02-14 DIAGNOSIS — M25.512 ACUTE PAIN OF LEFT SHOULDER: Primary | ICD-10-CM

## 2022-02-14 PROCEDURE — 73030 X-RAY EXAM OF SHOULDER: CPT | Performed by: RADIOLOGY

## 2022-02-14 PROCEDURE — 73030 X-RAY EXAM OF SHOULDER: CPT

## 2022-03-18 RX ORDER — LEVOTHYROXINE SODIUM 88 UG/1
TABLET ORAL
Qty: 30 TABLET | Refills: 3 | Status: SHIPPED | OUTPATIENT
Start: 2022-03-18 | End: 2022-08-08 | Stop reason: SDUPTHER

## 2022-04-22 ENCOUNTER — OFFICE VISIT (OUTPATIENT)
Dept: NEUROLOGY | Facility: CLINIC | Age: 67
End: 2022-04-22

## 2022-04-22 VITALS
TEMPERATURE: 97.1 F | BODY MASS INDEX: 26.5 KG/M2 | DIASTOLIC BLOOD PRESSURE: 84 MMHG | OXYGEN SATURATION: 99 % | WEIGHT: 144 LBS | SYSTOLIC BLOOD PRESSURE: 142 MMHG | HEIGHT: 62 IN | HEART RATE: 84 BPM

## 2022-04-22 DIAGNOSIS — G25.0 ESSENTIAL TREMOR: Chronic | ICD-10-CM

## 2022-04-22 DIAGNOSIS — G35 MULTIPLE SCLEROSIS: Primary | Chronic | ICD-10-CM

## 2022-04-22 DIAGNOSIS — F32.9 MAJOR DEPRESSIVE DISORDER WITH CURRENT ACTIVE EPISODE, UNSPECIFIED DEPRESSION EPISODE SEVERITY, UNSPECIFIED WHETHER RECURRENT: Chronic | ICD-10-CM

## 2022-04-22 DIAGNOSIS — R39.15 URINARY URGENCY: Chronic | ICD-10-CM

## 2022-04-22 PROCEDURE — 99214 OFFICE O/P EST MOD 30 MIN: CPT | Performed by: PSYCHIATRY & NEUROLOGY

## 2022-04-22 RX ORDER — ERGOCALCIFEROL 1.25 MG/1
50000 CAPSULE ORAL
Qty: 4 CAPSULE | Refills: 5 | Status: SHIPPED | OUTPATIENT
Start: 2022-04-22 | End: 2022-08-08 | Stop reason: SDUPTHER

## 2022-04-22 RX ORDER — BUPROPION HYDROCHLORIDE 150 MG/1
150 TABLET ORAL EVERY MORNING
Qty: 90 TABLET | Refills: 3 | Status: SHIPPED | OUTPATIENT
Start: 2022-04-22 | End: 2022-08-08

## 2022-04-22 RX ORDER — LEVOTHYROXINE SODIUM 0.12 MG/1
TABLET ORAL
COMMUNITY
End: 2022-08-08

## 2022-04-22 NOTE — PROGRESS NOTES
"Chief Complaint    Multiple Sclerosis    Subjective          Nicky Owens presents to [facility] NEUROLOGY     History of Present Illness    66 y.o. female returns in follow up.  Last visit on 10/19/21 continued Aubagio, Toviaz, Wellbutrin, ordered labs.       MRI Brain 8/28/20 as compared to 3/28/19 as compared to 12/26/17,  Mild T2 lesion load, no new, enlarging or enhancing lesions       1/10/22 Vit D, CMP, TSH NCS      Scheduling L rotator cuff repair May 3, 2022    MDD     Mood is stable       RRMS     Stopped Aubagio in December due to insurance change.     No new or worsening sx.       L UE and LE paresthesias miild intensity.      Fatigue intermittently present.         Essential Tremor     Tremor controlled     Urinary urgency      Toviaz improving frequency and urgrency       Objective   Vital Signs:   /84   Pulse 84   Temp 97.1 °F (36.2 °C)   Ht 157.5 cm (62.01\")   Wt 65.3 kg (144 lb)   SpO2 99%   BMI 26.33 kg/m²            Physical Exam  Eyes:      Extraocular Movements: EOM normal.      Pupils: Pupils are equal, round, and reactive to light.   Neurological:      Mental Status: She is oriented to person, place, and time.      Gait: Gait is intact.      Deep Tendon Reflexes: Strength normal.   Psychiatric:         Speech: Speech normal.          Neurologic Exam     Mental Status   Oriented to person, place, and time.   Speech: speech is normal   Level of consciousness: alert  Knowledge: good and consistent with education.   Normal comprehension.     Cranial Nerves   Cranial nerves II through XII intact.     CN II   Visual fields full to confrontation.   Visual acuity: normal  Right visual field deficit: none  Left visual field deficit: none     CN III, IV, VI   Pupils are equal, round, and reactive to light.  Extraocular motions are normal.   Nystagmus: none   Diplopia: none  Ophthalmoparesis: none  Upgaze: normal  Downgaze: normal  Conjugate gaze: present    CN V "   Facial sensation intact.   Right corneal reflex: normal  Left corneal reflex: normal    CN VII   Right facial weakness: none  Left facial weakness: none    CN VIII   Hearing: intact    CN IX, X   Palate: symmetric  Right gag reflex: normal  Left gag reflex: normal    CN XI   Right sternocleidomastoid strength: normal  Left sternocleidomastoid strength: normal    CN XII   Tongue: not atrophic  Fasciculations: absent  Tongue deviation: none    Motor Exam   Muscle bulk: normal  Overall muscle tone: normal    Strength   Strength 5/5 throughout.     Sensory Exam   Light touch normal.     Gait, Coordination, and Reflexes     Gait  Gait: normal    Tremor   Resting tremor: absent  Intention tremor: absent  Action tremor: absent    Reflexes   Reflexes 2+ except as noted.      Result Review :   The following data was reviewed by: Jorge L Donovan MD on 04/22/2022:  Common labs    Common Labsle 7/6/21 7/6/21 10/22/21 10/22/21 1/10/22 1/10/22    1113 1113 1445 1445 1106 1106   Glucose 101 (A)   88  84   BUN 11   11  13   Creatinine 0.72   0.78  0.79   eGFR Non  Am 81   74  73   Sodium 141   138  139   Potassium 3.8   4.1  4.2   Chloride 106   104  105   Calcium 8.9   9.4  9.2   Albumin 4.10   4.50  4.30   Total Bilirubin 0.4   0.3  0.3   Alkaline Phosphatase 87   88  83   AST (SGOT) 19   22  21   ALT (SGPT) 29   18  27   WBC   5.65      Hemoglobin   13.5      Hematocrit   40.8      Platelets   232      Total Cholesterol  260 (A)   245 (A)    Triglycerides  140   112    HDL Cholesterol  41   44    LDL Cholesterol   193 (A)   181 (A)    (A) Abnormal value                      Assessment and Plan    Diagnoses and all orders for this visit:    1. Multiple sclerosis (HCC) (Primary)  Assessment & Plan:  Sx stable off DMT          2. Essential tremor  Assessment & Plan:  Sx stable       3. Major depressive disorder with current active episode, unspecified depression episode severity, unspecified whether recurrent  Assessment  & Plan:  Mood is stable on Wellbutrin       4. Urinary urgency    Other orders  -     vitamin D (ERGOCALCIFEROL) 1.25 MG (72746 UT) capsule capsule; Take 1 capsule by mouth Every 7 (Seven) Days.  Dispense: 4 capsule; Refill: 5  -     buPROPion XL (WELLBUTRIN XL) 150 MG 24 hr tablet; Take 1 tablet by mouth Every Morning.  Dispense: 90 tablet; Refill: 3      Follow Up   No follow-ups on file.  Patient was given instructions and counseling regarding her condition or for health maintenance advice. Please see specific information pulled into the AVS if appropriate.

## 2022-06-29 ENCOUNTER — SPECIALTY PHARMACY (OUTPATIENT)
Dept: ONCOLOGY | Facility: HOSPITAL | Age: 67
End: 2022-06-29

## 2022-07-18 RX ORDER — LISINOPRIL 10 MG/1
TABLET ORAL
Qty: 30 TABLET | Refills: 1 | Status: SHIPPED | OUTPATIENT
Start: 2022-07-18 | End: 2022-08-08 | Stop reason: SDUPTHER

## 2022-08-08 ENCOUNTER — LAB (OUTPATIENT)
Dept: LAB | Facility: HOSPITAL | Age: 67
End: 2022-08-08

## 2022-08-08 ENCOUNTER — OFFICE VISIT (OUTPATIENT)
Dept: ENDOCRINOLOGY | Facility: CLINIC | Age: 67
End: 2022-08-08

## 2022-08-08 VITALS
SYSTOLIC BLOOD PRESSURE: 132 MMHG | WEIGHT: 146.2 LBS | OXYGEN SATURATION: 98 % | HEART RATE: 73 BPM | DIASTOLIC BLOOD PRESSURE: 80 MMHG | BODY MASS INDEX: 26.91 KG/M2 | HEIGHT: 62 IN

## 2022-08-08 DIAGNOSIS — I10 BENIGN ESSENTIAL HYPERTENSION: ICD-10-CM

## 2022-08-08 DIAGNOSIS — E03.9 ACQUIRED HYPOTHYROIDISM: ICD-10-CM

## 2022-08-08 DIAGNOSIS — E55.9 AVITAMINOSIS D: Primary | ICD-10-CM

## 2022-08-08 PROCEDURE — 82306 VITAMIN D 25 HYDROXY: CPT | Performed by: INTERNAL MEDICINE

## 2022-08-08 PROCEDURE — 80061 LIPID PANEL: CPT | Performed by: INTERNAL MEDICINE

## 2022-08-08 PROCEDURE — 99214 OFFICE O/P EST MOD 30 MIN: CPT | Performed by: INTERNAL MEDICINE

## 2022-08-08 PROCEDURE — 84439 ASSAY OF FREE THYROXINE: CPT | Performed by: INTERNAL MEDICINE

## 2022-08-08 PROCEDURE — 84443 ASSAY THYROID STIM HORMONE: CPT | Performed by: INTERNAL MEDICINE

## 2022-08-08 PROCEDURE — 80053 COMPREHEN METABOLIC PANEL: CPT | Performed by: INTERNAL MEDICINE

## 2022-08-08 RX ORDER — SIMVASTATIN 20 MG
20 TABLET ORAL DAILY
Qty: 30 TABLET | Refills: 5 | Status: SHIPPED | OUTPATIENT
Start: 2022-08-08 | End: 2023-02-06 | Stop reason: SDUPTHER

## 2022-08-08 RX ORDER — ERGOCALCIFEROL 1.25 MG/1
50000 CAPSULE ORAL
Qty: 4 CAPSULE | Refills: 5 | Status: SHIPPED | OUTPATIENT
Start: 2022-08-08 | End: 2023-02-06 | Stop reason: SDUPTHER

## 2022-08-08 RX ORDER — LISINOPRIL 10 MG/1
10 TABLET ORAL DAILY
Qty: 30 TABLET | Refills: 5 | Status: SHIPPED | OUTPATIENT
Start: 2022-08-08 | End: 2023-02-06 | Stop reason: SDUPTHER

## 2022-08-08 RX ORDER — LEVOTHYROXINE SODIUM 88 UG/1
88 TABLET ORAL DAILY
Qty: 30 TABLET | Refills: 5 | Status: SHIPPED | OUTPATIENT
Start: 2022-08-08 | End: 2023-02-06 | Stop reason: SDUPTHER

## 2022-08-08 NOTE — PROGRESS NOTES
Nicky Owens 66 y.o.  CC:Follow-up, Hypertension, Hyperlipidemia, Hypothyroidism, Vitamin D Deficiency, and Multiple Sclerosis      [place] [facility] [place]: Follow-up, Hypertension, Hyperlipidemia, Hypothyroidism, Vitamin D Deficiency, and Multiple Sclerosis    bp is good  Is on low fat diet and zocor 20 mg daily   Is taking synthroid 88 mcg daily   Shoulder surgery 5/22- not recovered - has MRI f/u scheduled left shoulder    Allergies   Allergen Reactions   • Penicillins        Current Outpatient Medications:   •  levothyroxine (SYNTHROID, LEVOTHROID) 88 MCG tablet, Take 1 tablet by mouth Daily., Disp: 30 tablet, Rfl: 5  •  lisinopril (PRINIVIL,ZESTRIL) 10 MG tablet, Take 1 tablet by mouth Daily., Disp: 30 tablet, Rfl: 5  •  simvastatin (ZOCOR) 20 MG tablet, Take 1 tablet by mouth Daily., Disp: 30 tablet, Rfl: 5  •  vitamin D (ERGOCALCIFEROL) 1.25 MG (11505 UT) capsule capsule, Take 1 capsule by mouth Every 7 (Seven) Days., Disp: 4 capsule, Rfl: 5  •  estradiol (MINIVELLE, VIVELLE-DOT) 0.05 MG/24HR patch, Place 1 patch on the skin 1 (One) Time Per Week., Disp: , Rfl:   •  TOVIAZ 4 MG tablet sustained-release 24 hour tablet, , Disp: , Rfl:   Patient Active Problem List    Diagnosis    • Major depressive disorder with current active episode [F32.9]    • Urinary urgency [R39.15]    • Bruising [T14.8XXA]    • Acute gastritis [K29.00]    • Abdominal pain RUQ [R10.9]    • Visit for screening mammogram [Z12.31]    • Anemia [D64.9]    • Anorexia [R63.0]    • Anxiety [F41.9]    • Benign essential hypertension [I10]    • Bone cyst [M85.60]    • H/O colonoscopy [Z98.890]    • Degeneration of intervertebral disc of lumbar region [M51.36]    • Dysphagia [R13.10]    • Erosive esophagitis [K22.10]    • Essential tremor [G25.0]    • Hyperlipidemia [E78.5]    • Hyperreflexia [R29.2]    • Hypothyroidism [E03.9]    • Multiple sclerosis (HCC) [G35]    • Heart murmur [R01.1]    • Muscle weakness [M62.81]    • Nausea [R11.0]    • Nonallopathic  "lesion of cervical region [M99.9]    • Osteoporosis [M81.0]    • Shoulder pain [M25.519]    • Avitaminosis D [E55.9]    • Cobalamin deficiency [E53.8]      Review of Systems   Constitutional: Negative for activity change, appetite change and unexpected weight change.   HENT: Negative for congestion and rhinorrhea.    Eyes: Negative for visual disturbance.   Respiratory: Negative for cough and shortness of breath.    Cardiovascular: Negative for palpitations and leg swelling.   Gastrointestinal: Negative for constipation, diarrhea and nausea.   Genitourinary: Negative for hematuria.   Musculoskeletal: Positive for arthralgias. Negative for back pain, gait problem, joint swelling and myalgias.   Skin: Negative for color change, rash and wound.   Allergic/Immunologic: Negative for environmental allergies, food allergies and immunocompromised state.   Neurological: Negative for dizziness, weakness and light-headedness.   Psychiatric/Behavioral: Negative for confusion, decreased concentration, dysphoric mood and sleep disturbance. The patient is not nervous/anxious.      Social History     Socioeconomic History   • Marital status:    Tobacco Use   • Smoking status: Never Smoker   • Smokeless tobacco: Never Used   Vaping Use   • Vaping Use: Never used   Substance and Sexual Activity   • Alcohol use: No   • Drug use: No   • Sexual activity: Defer     Family History   Problem Relation Age of Onset   • Heart disease Mother    • Hypertension Mother    • Heart disease Father    • Hypertension Father    • Hypertension Sister    • Other Sister         malignant neoplasm   • Mental illness Sister    • Stroke Brother    • Hypertension Brother    • Mental illness Brother    • Alzheimer's disease Other         Aunt     /80   Pulse 73   Ht 157.5 cm (62\")   Wt 66.3 kg (146 lb 3.2 oz)   SpO2 98%   BMI 26.74 kg/m²   Physical Exam  Vitals and nursing note reviewed.   Constitutional:       Appearance: Normal appearance. " She is well-developed.   HENT:      Head: Normocephalic and atraumatic.   Eyes:      General: Lids are normal.      Extraocular Movements: Extraocular movements intact.      Conjunctiva/sclera: Conjunctivae normal.      Pupils: Pupils are equal, round, and reactive to light.   Neck:      Thyroid: No thyroid mass or thyromegaly.      Vascular: No carotid bruit.      Trachea: Trachea normal. No tracheal deviation.   Cardiovascular:      Rate and Rhythm: Normal rate and regular rhythm.      Heart sounds: Normal heart sounds. No murmur heard.    No friction rub. No gallop.   Pulmonary:      Effort: Pulmonary effort is normal. No respiratory distress.      Breath sounds: Normal breath sounds. No wheezing.   Musculoskeletal:         General: No deformity. Normal range of motion.      Cervical back: Normal range of motion and neck supple.      Comments: Chronic pain left shoulder    Lymphadenopathy:      Cervical: No cervical adenopathy.   Skin:     General: Skin is warm and dry.      Findings: No erythema or rash.      Nails: There is no clubbing.   Neurological:      General: No focal deficit present.      Mental Status: She is alert and oriented to person, place, and time.      Cranial Nerves: No cranial nerve deficit.      Deep Tendon Reflexes: Reflexes are normal and symmetric. Reflexes normal.   Psychiatric:         Mood and Affect: Mood normal.         Speech: Speech normal.         Behavior: Behavior normal.         Thought Content: Thought content normal.         Judgment: Judgment normal.       Results for orders placed or performed in visit on 01/10/22   Comprehensive Metabolic Panel    Specimen: Blood   Result Value Ref Range    Glucose 84 65 - 99 mg/dL    BUN 13 8 - 23 mg/dL    Creatinine 0.79 0.57 - 1.00 mg/dL    Sodium 139 136 - 145 mmol/L    Potassium 4.2 3.5 - 5.2 mmol/L    Chloride 105 98 - 107 mmol/L    CO2 28.2 22.0 - 29.0 mmol/L    Calcium 9.2 8.6 - 10.5 mg/dL    Total Protein 6.5 6.0 - 8.5 g/dL     Albumin 4.30 3.50 - 5.20 g/dL    ALT (SGPT) 27 1 - 33 U/L    AST (SGOT) 21 1 - 32 U/L    Alkaline Phosphatase 83 39 - 117 U/L    Total Bilirubin 0.3 0.0 - 1.2 mg/dL    eGFR Non African Amer 73 >60 mL/min/1.73    Globulin 2.2 gm/dL    A/G Ratio 2.0 g/dL    BUN/Creatinine Ratio 16.5 7.0 - 25.0    Anion Gap 5.8 5.0 - 15.0 mmol/L   Lipid Panel    Specimen: Blood   Result Value Ref Range    Total Cholesterol 245 (H) 0 - 200 mg/dL    Triglycerides 112 0 - 150 mg/dL    HDL Cholesterol 44 40 - 60 mg/dL    LDL Cholesterol  181 (H) 0 - 100 mg/dL    VLDL Cholesterol 20 5 - 40 mg/dL    LDL/HDL Ratio 4.06    TSH    Specimen: Blood   Result Value Ref Range    TSH 2.160 0.270 - 4.200 uIU/mL   T4, Free    Specimen: Blood   Result Value Ref Range    Free T4 1.27 0.93 - 1.70 ng/dL   Vitamin D 25 Hydroxy    Specimen: Blood   Result Value Ref Range    25 Hydroxy, Vitamin D 51.6 30.0 - 100.0 ng/ml     Diagnoses and all orders for this visit:    1. Avitaminosis D (Primary)  Assessment & Plan:  Taking supplement  Update levels     Orders:  -     Vitamin D 25 Hydroxy    2. Benign essential hypertension  Assessment & Plan:  bp is controlled  Higher with recent shoulder pain- continue to monitor and notify if over 145/85    Orders:  -     Comprehensive Metabolic Panel  -     Lipid Panel    3. Acquired hypothyroidism  Assessment & Plan:  Taking levothyroxine 88 mcg daily   Check tfts     Orders:  -     TSH  -     T4, Free    Other orders  -     levothyroxine (SYNTHROID, LEVOTHROID) 88 MCG tablet; Take 1 tablet by mouth Daily.  Dispense: 30 tablet; Refill: 5  -     lisinopril (PRINIVIL,ZESTRIL) 10 MG tablet; Take 1 tablet by mouth Daily.  Dispense: 30 tablet; Refill: 5  -     simvastatin (ZOCOR) 20 MG tablet; Take 1 tablet by mouth Daily.  Dispense: 30 tablet; Refill: 5  -     vitamin D (ERGOCALCIFEROL) 1.25 MG (05884 UT) capsule capsule; Take 1 capsule by mouth Every 7 (Seven) Days.  Dispense: 4 capsule; Refill: 5  Return in about 6 months  (around 2/8/2023) for Recheck.    Tierney Mata MD  Signed Tierney Mata MD

## 2022-08-09 LAB
25(OH)D3 SERPL-MCNC: 38 NG/ML (ref 30–100)
ALBUMIN SERPL-MCNC: 4.6 G/DL (ref 3.5–5.2)
ALBUMIN/GLOB SERPL: 2 G/DL
ALP SERPL-CCNC: 80 U/L (ref 39–117)
ALT SERPL W P-5'-P-CCNC: 22 U/L (ref 1–33)
ANION GAP SERPL CALCULATED.3IONS-SCNC: 11 MMOL/L (ref 5–15)
AST SERPL-CCNC: 18 U/L (ref 1–32)
BILIRUB SERPL-MCNC: 0.3 MG/DL (ref 0–1.2)
BUN SERPL-MCNC: 13 MG/DL (ref 8–23)
BUN/CREAT SERPL: 17.1 (ref 7–25)
CALCIUM SPEC-SCNC: 9.1 MG/DL (ref 8.6–10.5)
CHLORIDE SERPL-SCNC: 103 MMOL/L (ref 98–107)
CHOLEST SERPL-MCNC: 247 MG/DL (ref 0–200)
CO2 SERPL-SCNC: 27 MMOL/L (ref 22–29)
CREAT SERPL-MCNC: 0.76 MG/DL (ref 0.57–1)
EGFRCR SERPLBLD CKD-EPI 2021: 86 ML/MIN/1.73
GLOBULIN UR ELPH-MCNC: 2.3 GM/DL
GLUCOSE SERPL-MCNC: 77 MG/DL (ref 65–99)
HDLC SERPL-MCNC: 54 MG/DL (ref 40–60)
LDLC SERPL CALC-MCNC: 169 MG/DL (ref 0–100)
LDLC/HDLC SERPL: 3.07 {RATIO}
POTASSIUM SERPL-SCNC: 4.2 MMOL/L (ref 3.5–5.2)
PROT SERPL-MCNC: 6.9 G/DL (ref 6–8.5)
SODIUM SERPL-SCNC: 141 MMOL/L (ref 136–145)
T4 FREE SERPL-MCNC: 1.55 NG/DL (ref 0.93–1.7)
TRIGL SERPL-MCNC: 135 MG/DL (ref 0–150)
TSH SERPL DL<=0.05 MIU/L-ACNC: 3.17 UIU/ML (ref 0.27–4.2)
VLDLC SERPL-MCNC: 24 MG/DL (ref 5–40)

## 2022-10-04 ENCOUNTER — TELEPHONE (OUTPATIENT)
Dept: NEUROLOGY | Facility: CLINIC | Age: 67
End: 2022-10-04

## 2022-10-04 NOTE — TELEPHONE ENCOUNTER
Caller: MICHAEL    Relationship: N/A    Best call back number: [phone]    What is the best time to reach you: 8-4PM EASTERN TIME    Who are you requesting to speak with (clinical staff, provider,  specific staff member): JONATHAN      What was the call regarding: MICHAEL UMANZOR/ [facility] IS CALLING RE: RX FOR AUBAGIO 14MG. IT APPEARS RX WAS DISCONTINUED WITH LAST RX BEING FILLED ON 12/14/21. SHE NEEDS TO KNOW IF THE END DATE IS CORRECT & IS THERE ANOTHER RX BEING PRESCRIBED IN PLACE OF THAT    PLEASE CALL & ADVISE    THANK YOU

## 2022-10-28 ENCOUNTER — OFFICE VISIT (OUTPATIENT)
Dept: NEUROLOGY | Facility: CLINIC | Age: 67
End: 2022-10-28

## 2022-10-28 VITALS
TEMPERATURE: 96.8 F | HEART RATE: 71 BPM | DIASTOLIC BLOOD PRESSURE: 78 MMHG | BODY MASS INDEX: 27.6 KG/M2 | WEIGHT: 150 LBS | HEIGHT: 62 IN | SYSTOLIC BLOOD PRESSURE: 130 MMHG | OXYGEN SATURATION: 99 %

## 2022-10-28 DIAGNOSIS — F32.9 MAJOR DEPRESSIVE DISORDER WITH CURRENT ACTIVE EPISODE, UNSPECIFIED DEPRESSION EPISODE SEVERITY, UNSPECIFIED WHETHER RECURRENT: Chronic | ICD-10-CM

## 2022-10-28 DIAGNOSIS — G35 MULTIPLE SCLEROSIS: Primary | Chronic | ICD-10-CM

## 2022-10-28 DIAGNOSIS — G25.0 ESSENTIAL TREMOR: Chronic | ICD-10-CM

## 2022-10-28 PROCEDURE — 99214 OFFICE O/P EST MOD 30 MIN: CPT | Performed by: PSYCHIATRY & NEUROLOGY

## 2022-10-28 NOTE — PROGRESS NOTES
"Chief Complaint    Multiple Sclerosis (clinic)    Subjective        Nicky Owens presents to [facility] NEUROLOGY     History of Present Illness    66 y.o. female returns in follow up.  Last visit on 4/22/22 remained off DMT, continued Wellbutrin.     MRI Brain 8/28/20 as compared to 3/28/19 as compared to 12/26/17,  Mild T2 lesion load, no new, enlarging or enhancing lesions       1/10/22 Vit D, CMP, TSH NCS      Scheduling L rotator cuff repair May 3, 2022     MDD     Mood is stable       RRMS     MSFC improved.       No new or worsening sx.       L UE and LE paresthesias miild intensity.      Fatigue intermittently present.         Essential Tremor     Tremor controlled     Urinary urgency      Toviaz improving frequency and urgrency        Objective   Vital Signs:  /78   Pulse 71   Temp 96.8 °F (36 °C)   Ht 157.5 cm (62.01\")   Wt 68 kg (150 lb)   SpO2 99%   BMI 27.43 kg/m²   Estimated body mass index is 27.43 kg/m² as calculated from the following:    Height as of this encounter: 157.5 cm (62.01\").    Weight as of this encounter: 68 kg (150 lb).          Physical Exam  Eyes:      Extraocular Movements: EOM normal.      Pupils: Pupils are equal, round, and reactive to light.   Neurological:      Mental Status: She is oriented to person, place, and time.      Cranial Nerves: Cranial nerves 2-12 are intact.      Motor: Motor strength is normal.      Gait: Gait is intact.   Psychiatric:         Speech: Speech normal.          Neurologic Exam     Mental Status   Oriented to person, place, and time.   Speech: speech is normal   Level of consciousness: alert  Knowledge: good and consistent with education.   Normal comprehension.     Cranial Nerves   Cranial nerves II through XII intact.     CN II   Visual fields full to confrontation.   Visual acuity: normal  Right visual field deficit: none  Left visual field deficit: none     CN III, IV, VI   Pupils are equal, round, and " reactive to light.  Extraocular motions are normal.   Nystagmus: none   Diplopia: none  Ophthalmoparesis: none  Upgaze: normal  Downgaze: normal  Conjugate gaze: present    CN V   Facial sensation intact.   Right corneal reflex: normal  Left corneal reflex: normal    CN VII   Right facial weakness: none  Left facial weakness: none    CN VIII   Hearing: intact    CN IX, X   Palate: symmetric  Right gag reflex: normal  Left gag reflex: normal    CN XI   Right sternocleidomastoid strength: normal  Left sternocleidomastoid strength: normal    CN XII   Tongue: not atrophic  Fasciculations: absent  Tongue deviation: none    Motor Exam   Muscle bulk: normal  Overall muscle tone: normal    Strength   Strength 5/5 throughout.     Sensory Exam   Light touch normal.     Gait, Coordination, and Reflexes     Gait  Gait: normal    Tremor   Resting tremor: absent  Intention tremor: absent  Action tremor: absent    Reflexes   Reflexes 2+ except as noted.      Result Review :  The following data was reviewed by: Jorge L Donovan MD on 10/28/2022:  Common labs    Common Labs 1/10/22 1/10/22 8/8/22 8/8/22    1106 1106 1104 1104   Glucose  84 77    BUN  13 13    Creatinine  0.79 0.76    eGFR Non African Am  73     Sodium  139 141    Potassium  4.2 4.2    Chloride  105 103    Calcium  9.2 9.1    Albumin  4.30 4.60    Total Bilirubin  0.3 0.3    Alkaline Phosphatase  83 80    AST (SGOT)  21 18    ALT (SGPT)  27 22    Total Cholesterol 245 (A)   247 (A)   Triglycerides 112   135   HDL Cholesterol 44   54   LDL Cholesterol  181 (A)   169 (A)   (A) Abnormal value                      Assessment and Plan   Diagnoses and all orders for this visit:    1. Multiple sclerosis (HCC) (Primary)  Assessment & Plan:  MSFC improved     MRI B/C      Orders:  -     MRI Brain With & Without Contrast; Future  -     MRI Cervical Spine With & Without Contrast; Future    2. Essential tremor  Assessment & Plan:  Stable       3. Major depressive disorder with  current active episode, unspecified depression episode severity, unspecified whether recurrent  Assessment & Plan:  Mood is stable                Follow Up   No follow-ups on file.  Patient was given instructions and counseling regarding her condition or for health maintenance advice. Please see specific information pulled into the AVS if appropriate.

## 2022-11-07 ENCOUNTER — TELEPHONE (OUTPATIENT)
Dept: NEUROLOGY | Facility: CLINIC | Age: 67
End: 2022-11-07

## 2022-11-07 NOTE — TELEPHONE ENCOUNTER
JAIME WITH [facility] REQUESTING OV NOTES FROM 10/28/22.    FAX TO: [phone]  ATTN: JAIME      THANK YOU

## 2022-12-16 ENCOUNTER — HOSPITAL ENCOUNTER (OUTPATIENT)
Dept: MRI IMAGING | Facility: HOSPITAL | Age: 67
Discharge: HOME | End: 2022-12-16

## 2022-12-16 DIAGNOSIS — G35 MULTIPLE SCLEROSIS: Chronic | ICD-10-CM

## 2022-12-16 LAB — CREAT BLDA-MCNC: 0.8 MG/DL (ref 0.6–1.3)

## 2022-12-16 PROCEDURE — 72156 MRI NECK SPINE W/O & W/DYE: CPT

## 2022-12-16 PROCEDURE — A9577 INJ MULTIHANCE: HCPCS | Performed by: PSYCHIATRY & NEUROLOGY

## 2022-12-16 PROCEDURE — 70553 MRI BRAIN STEM W/O & W/DYE: CPT

## 2022-12-16 PROCEDURE — 82565 ASSAY OF CREATININE: CPT

## 2022-12-16 PROCEDURE — 0 GADOBENATE DIMEGLUMINE 529 MG/ML SOLUTION: Performed by: PSYCHIATRY & NEUROLOGY

## 2022-12-16 RX ADMIN — GADOBENATE DIMEGLUMINE 14 ML: 529 INJECTION, SOLUTION INTRAVENOUS at 12:42

## 2022-12-19 ENCOUNTER — TELEPHONE (OUTPATIENT)
Dept: NEUROLOGY | Facility: CLINIC | Age: 67
End: 2022-12-19

## 2022-12-19 NOTE — TELEPHONE ENCOUNTER
----- Message from Jorge L Donovan MD sent at 12/19/2022  7:50 AM EST -----  Notify pt her MRI is stable

## 2022-12-19 NOTE — TELEPHONE ENCOUNTER
Caller: Nicky Owens    Relationship: Self    Best call back number: [phone]    What is the best time to reach you: ANYTIME ON 12-20-22    Who are you requesting to speak with (clinical staff, provider,  specific staff member): CLINICAL    Do you know the name of the person who called: DANYEL    What was the call regarding: RESULTS    Do you require a callback: YES

## 2023-02-06 ENCOUNTER — OFFICE VISIT (OUTPATIENT)
Dept: ENDOCRINOLOGY | Facility: CLINIC | Age: 68
End: 2023-02-06
Payer: COMMERCIAL

## 2023-02-06 VITALS
HEART RATE: 66 BPM | OXYGEN SATURATION: 100 % | SYSTOLIC BLOOD PRESSURE: 122 MMHG | WEIGHT: 151 LBS | DIASTOLIC BLOOD PRESSURE: 68 MMHG | BODY MASS INDEX: 27.79 KG/M2 | HEIGHT: 62 IN

## 2023-02-06 DIAGNOSIS — E55.9 AVITAMINOSIS D: Primary | ICD-10-CM

## 2023-02-06 DIAGNOSIS — E03.9 ACQUIRED HYPOTHYROIDISM: ICD-10-CM

## 2023-02-06 DIAGNOSIS — E78.2 MIXED HYPERLIPIDEMIA: ICD-10-CM

## 2023-02-06 DIAGNOSIS — I10 BENIGN ESSENTIAL HYPERTENSION: ICD-10-CM

## 2023-02-06 LAB
25(OH)D3 SERPL-MCNC: 46.3 NG/ML (ref 30–100)
ALBUMIN SERPL-MCNC: 4.3 G/DL (ref 3.5–5.2)
ALBUMIN/GLOB SERPL: 2 G/DL
ALP SERPL-CCNC: 82 U/L (ref 39–117)
ALT SERPL W P-5'-P-CCNC: 15 U/L (ref 1–33)
ANION GAP SERPL CALCULATED.3IONS-SCNC: 8 MMOL/L (ref 5–15)
AST SERPL-CCNC: 19 U/L (ref 1–32)
BILIRUB SERPL-MCNC: 0.3 MG/DL (ref 0–1.2)
BUN SERPL-MCNC: 14 MG/DL (ref 8–23)
BUN/CREAT SERPL: 18.4 (ref 7–25)
CALCIUM SPEC-SCNC: 9.3 MG/DL (ref 8.6–10.5)
CHLORIDE SERPL-SCNC: 106 MMOL/L (ref 98–107)
CHOLEST SERPL-MCNC: 249 MG/DL (ref 0–200)
CO2 SERPL-SCNC: 26 MMOL/L (ref 22–29)
CREAT SERPL-MCNC: 0.76 MG/DL (ref 0.57–1)
EGFRCR SERPLBLD CKD-EPI 2021: 85.5 ML/MIN/1.73
GLOBULIN UR ELPH-MCNC: 2.2 GM/DL
GLUCOSE SERPL-MCNC: 88 MG/DL (ref 65–99)
HDLC SERPL-MCNC: 48 MG/DL (ref 40–60)
LDLC SERPL CALC-MCNC: 184 MG/DL (ref 0–100)
LDLC/HDLC SERPL: 3.79 {RATIO}
POTASSIUM SERPL-SCNC: 4.2 MMOL/L (ref 3.5–5.2)
PROT SERPL-MCNC: 6.5 G/DL (ref 6–8.5)
SODIUM SERPL-SCNC: 140 MMOL/L (ref 136–145)
T4 FREE SERPL-MCNC: 1.73 NG/DL (ref 0.93–1.7)
TRIGL SERPL-MCNC: 96 MG/DL (ref 0–150)
TSH SERPL DL<=0.05 MIU/L-ACNC: 0.49 UIU/ML (ref 0.27–4.2)
VLDLC SERPL-MCNC: 17 MG/DL (ref 5–40)

## 2023-02-06 PROCEDURE — 80061 LIPID PANEL: CPT | Performed by: INTERNAL MEDICINE

## 2023-02-06 PROCEDURE — 82306 VITAMIN D 25 HYDROXY: CPT | Performed by: INTERNAL MEDICINE

## 2023-02-06 PROCEDURE — 99214 OFFICE O/P EST MOD 30 MIN: CPT | Performed by: INTERNAL MEDICINE

## 2023-02-06 PROCEDURE — 80053 COMPREHEN METABOLIC PANEL: CPT | Performed by: INTERNAL MEDICINE

## 2023-02-06 PROCEDURE — 84439 ASSAY OF FREE THYROXINE: CPT | Performed by: INTERNAL MEDICINE

## 2023-02-06 PROCEDURE — 84443 ASSAY THYROID STIM HORMONE: CPT | Performed by: INTERNAL MEDICINE

## 2023-02-06 RX ORDER — ERGOCALCIFEROL 1.25 MG/1
50000 CAPSULE ORAL
Qty: 4 CAPSULE | Refills: 5 | Status: SHIPPED | OUTPATIENT
Start: 2023-02-06

## 2023-02-06 RX ORDER — LISINOPRIL 10 MG/1
10 TABLET ORAL DAILY
Qty: 30 TABLET | Refills: 5 | Status: SHIPPED | OUTPATIENT
Start: 2023-02-06

## 2023-02-06 RX ORDER — LEVOTHYROXINE SODIUM 88 UG/1
88 TABLET ORAL DAILY
Qty: 30 TABLET | Refills: 5 | Status: SHIPPED | OUTPATIENT
Start: 2023-02-06

## 2023-02-06 RX ORDER — SIMVASTATIN 20 MG
20 TABLET ORAL DAILY
Qty: 30 TABLET | Refills: 5 | Status: SHIPPED | OUTPATIENT
Start: 2023-02-06

## 2023-02-06 NOTE — PROGRESS NOTES
Nickyeris Owens 67 y.o.  CC:Follow-up, Hypertension, Hyperlipidemia, Hypothyroidism, Vitamin D Deficiency, and Multiple Sclerosis      [place]: Follow-up, Hypertension, Hyperlipidemia, Hypothyroidism, Vitamin D Deficiency, and Multiple Sclerosis    bp is good today   Is taking lisinopril 10 mg daily   Is taking synthroid 88 mcg daily   Is taking zocor 20 mg daily and following low fat diet  Is taking vitamin D supplement   Shoulder surgery December- scar tissue  In physical therapy now     Allergies   Allergen Reactions   • Penicillins        Current Outpatient Medications:   •  levothyroxine (SYNTHROID, LEVOTHROID) 88 MCG tablet, Take 1 tablet by mouth Daily., Disp: 30 tablet, Rfl: 5  •  lisinopril (PRINIVIL,ZESTRIL) 10 MG tablet, Take 1 tablet by mouth Daily., Disp: 30 tablet, Rfl: 5  •  simvastatin (ZOCOR) 20 MG tablet, Take 1 tablet by mouth Daily., Disp: 30 tablet, Rfl: 5  •  vitamin D (ERGOCALCIFEROL) 1.25 MG (63450 UT) capsule capsule, Take 1 capsule by mouth Every 7 (Seven) Days., Disp: 4 capsule, Rfl: 5  •  estradiol (MINIVELLE, VIVELLE-DOT) 0.05 MG/24HR patch, Place 1 patch on the skin 1 (One) Time Per Week., Disp: , Rfl:   •  TOVIAZ 4 MG tablet sustained-release 24 hour tablet, Take 1 tablet by mouth Daily., Disp: , Rfl:   Patient Active Problem List    Diagnosis    • Major depressive disorder with current active episode [F32.9]    • Urinary urgency [R39.15]    • Bruising [T14.8XXA]    • Acute gastritis [K29.00]    • Abdominal pain RUQ [R10.9]    • Visit for screening mammogram [Z12.31]    • Anemia [D64.9]    • Anorexia [R63.0]    • Anxiety [F41.9]    • Benign essential hypertension [I10]    • Bone cyst [M85.60]    • H/O colonoscopy [Z98.890]    • Degeneration of intervertebral disc of lumbar region [M51.36]    • Dysphagia [R13.10]    • Erosive esophagitis [K22.10]    • Essential tremor [G25.0]    • Hyperlipidemia [E78.5]    • Hyperreflexia [R29.2]    • Hypothyroidism [E03.9]    • Multiple sclerosis  "([facility]) [G35]    • Heart murmur [R01.1]    • Muscle weakness [M62.81]    • Nausea [R11.0]    • Nonallopathic lesion of cervical region [M99.9]    • Osteoporosis [M81.0]    • Shoulder pain [M25.519]    • Avitaminosis D [E55.9]    • Cobalamin deficiency [E53.8]      Review of Systems   Constitutional: Negative for activity change, appetite change and unexpected weight change.   HENT: Negative for congestion and rhinorrhea.    Eyes: Negative for visual disturbance.   Respiratory: Negative for cough and shortness of breath.    Cardiovascular: Negative for palpitations and leg swelling.   Gastrointestinal: Negative for constipation, diarrhea and nausea.   Genitourinary: Negative for hematuria.   Musculoskeletal: Negative for arthralgias, back pain, gait problem, joint swelling and myalgias.   Skin: Negative for color change, rash and wound.   Allergic/Immunologic: Negative for environmental allergies, food allergies and immunocompromised state.   Neurological: Negative for dizziness, weakness and light-headedness.   Psychiatric/Behavioral: Negative for confusion, decreased concentration, dysphoric mood and sleep disturbance. The patient is not nervous/anxious.      Social History     Socioeconomic History   • Marital status:    Tobacco Use   • Smoking status: Never   • Smokeless tobacco: Never   Vaping Use   • Vaping Use: Never used   Substance and Sexual Activity   • Alcohol use: No   • Drug use: No   • Sexual activity: Defer     Family History   Problem Relation Age of Onset   • Heart disease Mother    • Hypertension Mother    • Heart disease Father    • Hypertension Father    • Hypertension Sister    • Other Sister         malignant neoplasm   • Mental illness Sister    • Stroke Brother    • Hypertension Brother    • Mental illness Brother    • Alzheimer's disease Other         Aunt     /68   Pulse 66   Ht 157.5 cm (62\")   Wt 68.5 kg (151 lb)   SpO2 100%   BMI 27.62 kg/m²   Physical Exam  Vitals and " nursing note reviewed.   Constitutional:       Appearance: She is well-developed.   HENT:      Head: Normocephalic and atraumatic.      Nose: Nose normal.   Eyes:      General: Lids are normal.      Conjunctiva/sclera: Conjunctivae normal.      Pupils: Pupils are equal, round, and reactive to light.   Neck:      Thyroid: No thyroid mass or thyromegaly.      Vascular: No carotid bruit.      Trachea: Trachea normal. No tracheal deviation.   Cardiovascular:      Rate and Rhythm: Normal rate and regular rhythm.      Heart sounds: Normal heart sounds. No murmur heard.    No friction rub. No gallop.   Pulmonary:      Effort: Pulmonary effort is normal. No respiratory distress.      Breath sounds: Normal breath sounds. No wheezing.   Musculoskeletal:         General: No deformity. Normal range of motion.      Cervical back: Normal range of motion and neck supple.   Lymphadenopathy:      Cervical: No cervical adenopathy.   Skin:     General: Skin is warm and dry.      Findings: No erythema or rash.      Nails: There is no clubbing.   Neurological:      Mental Status: She is alert and oriented to person, place, and time.      Cranial Nerves: No cranial nerve deficit.      Deep Tendon Reflexes: Reflexes are normal and symmetric. Reflexes normal.   Psychiatric:         Speech: Speech normal.         Behavior: Behavior normal.         Thought Content: Thought content normal.         Judgment: Judgment normal.       Results for orders placed or performed during the hospital encounter of 12/16/22   POC Creatinine    Specimen: Blood   Result Value Ref Range    Creatinine 0.80 0.60 - 1.30 mg/dL     Diagnoses and all orders for this visit:    1. Avitaminosis D (Primary)  Assessment & Plan:  Update levels  Continue supplement     Orders:  -     Vitamin D,25-Hydroxy; Future  -     Vitamin D,25-Hydroxy    2. Benign essential hypertension  Assessment & Plan:  bp is good   Continue monitoring and lisinopril     Orders:  -      Comprehensive Metabolic Panel; Future  -     Comprehensive Metabolic Panel    3. Mixed hyperlipidemia  Assessment & Plan:  Taking statin therapy   Check flp     Orders:  -     Lipid Panel; Future  -     Lipid Panel    4. Acquired hypothyroidism  Assessment & Plan:  Taking synthroid 88 mcg daily   Check tfts     Orders:  -     TSH; Future  -     T4, Free; Future  -     TSH  -     T4, Free    Other orders  -     levothyroxine (SYNTHROID, LEVOTHROID) 88 MCG tablet; Take 1 tablet by mouth Daily.  Dispense: 30 tablet; Refill: 5  -     lisinopril (PRINIVIL,ZESTRIL) 10 MG tablet; Take 1 tablet by mouth Daily.  Dispense: 30 tablet; Refill: 5  -     simvastatin (ZOCOR) 20 MG tablet; Take 1 tablet by mouth Daily.  Dispense: 30 tablet; Refill: 5  -     vitamin D (ERGOCALCIFEROL) 1.25 MG (75981 UT) capsule capsule; Take 1 capsule by mouth Every 7 (Seven) Days.  Dispense: 4 capsule; Refill: 5  Return in about 6 months (around 8/6/2023) for Recheck.    Tierney Mata MD  Signed Tierney Mata MD

## 2023-02-07 ENCOUNTER — TELEPHONE (OUTPATIENT)
Dept: NEUROLOGY | Facility: CLINIC | Age: 68
End: 2023-02-07
Payer: COMMERCIAL

## 2023-02-07 NOTE — TELEPHONE ENCOUNTER
Deniseied Quirino that we notified patient and family MRI was stable back in December. She requested to know when next follow up was for patient. Provided her with the date.

## 2023-02-07 NOTE — TELEPHONE ENCOUNTER
Provider: JONATHAN  Caller: SALIMA  Relationship to Patient: [facility] [employer]  Phone Number: [phone]  Reason for Call: [facility] [employer] TELEPHONED TO RE: RECENT MRI. SHE WOULD LIKE TO KNOW IF THE MRI SHOWED ANY NEW LESIONS.    PLEASE CALL OR FAX THE MRI REPORT TO [phone]

## 2023-03-17 ENCOUNTER — OFFICE VISIT (OUTPATIENT)
Dept: NEUROLOGY | Facility: CLINIC | Age: 68
End: 2023-03-17
Payer: COMMERCIAL

## 2023-03-17 VITALS
BODY MASS INDEX: 27.94 KG/M2 | WEIGHT: 151.8 LBS | DIASTOLIC BLOOD PRESSURE: 64 MMHG | HEIGHT: 62 IN | SYSTOLIC BLOOD PRESSURE: 128 MMHG | HEART RATE: 84 BPM | TEMPERATURE: 96.9 F | OXYGEN SATURATION: 98 %

## 2023-03-17 DIAGNOSIS — G25.0 ESSENTIAL TREMOR: Chronic | ICD-10-CM

## 2023-03-17 DIAGNOSIS — G35 MULTIPLE SCLEROSIS: Primary | Chronic | ICD-10-CM

## 2023-03-17 PROCEDURE — 99214 OFFICE O/P EST MOD 30 MIN: CPT | Performed by: PSYCHIATRY & NEUROLOGY

## 2023-03-17 NOTE — PROGRESS NOTES
"Chief Complaint    Multiple Sclerosis    Subjective        Nicky Owens presents to [facility] NEUROLOGY [facility]     History of Present Illness    67 y.o. female returns in follow up.  Last visit on 10/28/22 ordered MRI B/C.    MRI B/CV, my review of films, 12/16/22 as compared to 8/28/2020 moderate T2 lesion load, no cord lesions, no new/enlarging/enhancing lesions.     MDD     Mood is stable       RRMS     MSFC improved.       No new or worsening sx.       L UE and LE paresthesias miild intensity.      Fatigue intermittently present.         Essential Tremor     Tremor controlled     Urinary urgency      Toviaz improving frequency and urgrency    Objective   Vital Signs:  /64   Pulse 84   Temp 96.9 °F (36.1 °C)   Ht 157.5 cm (62\")   Wt 68.9 kg (151 lb 12.8 oz)   SpO2 98%   BMI 27.76 kg/m²   Estimated body mass index is 27.76 kg/m² as calculated from the following:    Height as of this encounter: 157.5 cm (62\").    Weight as of this encounter: 68.9 kg (151 lb 12.8 oz).             Physical Exam  Eyes:      Extraocular Movements: EOM normal.      Pupils: Pupils are equal, round, and reactive to light.   Neurological:      Mental Status: She is oriented to person, place, and time.      Cranial Nerves: Cranial nerves 2-12 are intact.      Motor: Motor strength is normal.      Gait: Gait is intact.   Psychiatric:         Speech: Speech normal.          Neurologic Exam     Mental Status   Oriented to person, place, and time.   Speech: speech is normal   Level of consciousness: alert  Knowledge: good and consistent with education.   Normal comprehension.     Cranial Nerves   Cranial nerves II through XII intact.     CN II   Visual fields full to confrontation.   Visual acuity: normal  Right visual field deficit: none  Left visual field deficit: none     CN III, IV, VI   Pupils are equal, round, and reactive to light.  Extraocular motions are normal.   Nystagmus: none   Diplopia: " none  Ophthalmoparesis: none  Upgaze: normal  Downgaze: normal  Conjugate gaze: present    CN V   Facial sensation intact.   Right corneal reflex: normal  Left corneal reflex: normal    CN VII   Right facial weakness: none  Left facial weakness: none    CN VIII   Hearing: intact    CN IX, X   Palate: symmetric  Right gag reflex: normal  Left gag reflex: normal    CN XI   Right sternocleidomastoid strength: normal  Left sternocleidomastoid strength: normal    CN XII   Tongue: not atrophic  Fasciculations: absent  Tongue deviation: none    Motor Exam   Muscle bulk: normal  Overall muscle tone: normal    Strength   Strength 5/5 throughout.     Sensory Exam   Light touch normal.     Gait, Coordination, and Reflexes     Gait  Gait: normal    Tremor   Resting tremor: absent  Intention tremor: absent  Action tremor: absent    Reflexes   Reflexes 2+ except as noted.      Result Review :  The following data was reviewed by: Jorge L Donovan MD on 03/17/2023:  Common labs    Common Labs 8/8/22 8/8/22 12/16/22 2/6/23 2/6/23    1104 1104  1052 1052   Glucose 77   88    BUN 13   14    Creatinine 0.76  0.80 0.76    Sodium 141   140    Potassium 4.2   4.2    Chloride 103   106    Calcium 9.1   9.3    Albumin 4.60   4.3    Total Bilirubin 0.3   0.3    Alkaline Phosphatase 80   82    AST (SGOT) 18   19    ALT (SGPT) 22   15    Total Cholesterol  247 (A)   249 (A)   Triglycerides  135   96   HDL Cholesterol  54   48   LDL Cholesterol   169 (A)   184 (A)   (A) Abnormal value       Comments are available for some flowsheets but are not being displayed.           Data reviewed: Radiologic studies MRI B/c             Assessment and Plan   Diagnoses and all orders for this visit:    1. Multiple sclerosis (HCC) (Primary)  Assessment & Plan:  Stable off DMT      2. Essential tremor  Assessment & Plan:  Tremor controlled              Follow Up   No follow-ups on file.  Patient was given instructions and counseling regarding her condition or  for health maintenance advice. Please see specific information pulled into the AVS if appropriate.

## 2023-03-28 ENCOUNTER — TELEPHONE (OUTPATIENT)
Dept: NEUROLOGY | Facility: CLINIC | Age: 68
End: 2023-03-28
Payer: COMMERCIAL

## 2023-04-24 RX ORDER — BUPROPION HYDROCHLORIDE 150 MG/1
TABLET ORAL
Qty: 90 TABLET | Refills: 3 | OUTPATIENT
Start: 2023-04-24

## 2023-08-14 ENCOUNTER — OFFICE VISIT (OUTPATIENT)
Dept: ENDOCRINOLOGY | Facility: CLINIC | Age: 68
End: 2023-08-14
Payer: COMMERCIAL

## 2023-08-14 VITALS
OXYGEN SATURATION: 97 % | SYSTOLIC BLOOD PRESSURE: 148 MMHG | HEART RATE: 63 BPM | WEIGHT: 147.6 LBS | HEIGHT: 62 IN | DIASTOLIC BLOOD PRESSURE: 78 MMHG | BODY MASS INDEX: 27.16 KG/M2

## 2023-08-14 DIAGNOSIS — E78.2 MIXED HYPERLIPIDEMIA: ICD-10-CM

## 2023-08-14 DIAGNOSIS — E55.9 AVITAMINOSIS D: ICD-10-CM

## 2023-08-14 DIAGNOSIS — E03.9 ACQUIRED HYPOTHYROIDISM: ICD-10-CM

## 2023-08-14 DIAGNOSIS — I10 BENIGN ESSENTIAL HYPERTENSION: Primary | ICD-10-CM

## 2023-08-14 PROCEDURE — 82306 VITAMIN D 25 HYDROXY: CPT | Performed by: INTERNAL MEDICINE

## 2023-08-14 PROCEDURE — 84443 ASSAY THYROID STIM HORMONE: CPT | Performed by: INTERNAL MEDICINE

## 2023-08-14 PROCEDURE — 84439 ASSAY OF FREE THYROXINE: CPT | Performed by: INTERNAL MEDICINE

## 2023-08-14 PROCEDURE — 80061 LIPID PANEL: CPT | Performed by: INTERNAL MEDICINE

## 2023-08-14 PROCEDURE — 80053 COMPREHEN METABOLIC PANEL: CPT | Performed by: INTERNAL MEDICINE

## 2023-08-14 PROCEDURE — 99214 OFFICE O/P EST MOD 30 MIN: CPT | Performed by: INTERNAL MEDICINE

## 2023-08-14 RX ORDER — SIMVASTATIN 20 MG
20 TABLET ORAL DAILY
Qty: 90 TABLET | Refills: 1 | Status: SHIPPED | OUTPATIENT
Start: 2023-08-14

## 2023-08-14 RX ORDER — LEVOTHYROXINE SODIUM 88 UG/1
88 TABLET ORAL DAILY
Qty: 90 TABLET | Refills: 1 | Status: SHIPPED | OUTPATIENT
Start: 2023-08-14 | End: 2023-08-15 | Stop reason: SDUPTHER

## 2023-08-14 RX ORDER — ERGOCALCIFEROL 1.25 MG/1
50000 CAPSULE ORAL
Qty: 12 CAPSULE | Refills: 1 | Status: SHIPPED | OUTPATIENT
Start: 2023-08-14

## 2023-08-14 RX ORDER — LISINOPRIL 10 MG/1
10 TABLET ORAL DAILY
Qty: 90 TABLET | Refills: 1 | Status: SHIPPED | OUTPATIENT
Start: 2023-08-14

## 2023-08-14 NOTE — PROGRESS NOTES
Nicky Owens 67 y.o.  CC:Follow-up, Hypothyroidism, Hypertension, Hyperlipidemia, and Vitamin D Deficiency    [place]: Follow-up, Hypothyroidism, Hypertension, Hyperlipidemia, and Vitamin D Deficiency    Taking synthroid 88 mcg daily   Bp is high/ normal- taking lisinopril   Is taking vitamin D supplement   Eating low fat diet, taking zocor 20 mg daily    Allergies   Allergen Reactions    Penicillins        Current Outpatient Medications:     levothyroxine (SYNTHROID, LEVOTHROID) 88 MCG tablet, Take 1 tablet by mouth Daily., Disp: 90 tablet, Rfl: 1    lisinopril (PRINIVIL,ZESTRIL) 10 MG tablet, Take 1 tablet by mouth Daily., Disp: 90 tablet, Rfl: 1    simvastatin (ZOCOR) 20 MG tablet, Take 1 tablet by mouth Daily., Disp: 90 tablet, Rfl: 1    vitamin D (ERGOCALCIFEROL) 1.25 MG (38716 UT) capsule capsule, Take 1 capsule by mouth Every 7 (Seven) Days., Disp: 12 capsule, Rfl: 1    estradiol (MINIVELLE, VIVELLE-DOT) 0.05 MG/24HR patch, Place 1 patch on the skin as directed by provider 1 (One) Time Per Week., Disp: , Rfl:     TOVIAZ 4 MG tablet sustained-release 24 hour tablet, Take 1 tablet by mouth Daily., Disp: , Rfl:   Patient Active Problem List    Diagnosis     Major depressive disorder with current active episode [F32.9]     Urinary urgency [R39.15]     Bruising [T14.8XXA]     Acute gastritis [K29.00]     Abdominal pain RUQ [R10.9]     Visit for screening mammogram [Z12.31]     Anemia [D64.9]     Anorexia [R63.0]     Anxiety [F41.9]     Benign essential hypertension [I10]     Bone cyst [M85.60]     H/O colonoscopy [Z98.890]     Degeneration of intervertebral disc of lumbar region [M51.36]     Dysphagia [R13.10]     Erosive esophagitis [K22.10]     Essential tremor [G25.0]     Hyperlipidemia [E78.5]     Hyperreflexia [R29.2]     Hypothyroidism [E03.9]     Multiple sclerosis [G35]     Heart murmur [R01.1]     Muscle weakness [M62.81]     Nausea [R11.0]     Nonallopathic lesion of cervical region [M99.9]      "Osteoporosis [M81.0]     Shoulder pain [M25.519]     Avitaminosis D [E55.9]     Cobalamin deficiency [E53.8]      Review of Systems   Constitutional:  Negative for activity change, appetite change and unexpected weight change.   HENT:  Negative for congestion and rhinorrhea.    Eyes:  Negative for visual disturbance.   Respiratory:  Negative for cough and shortness of breath.    Cardiovascular:  Negative for palpitations and leg swelling.   Gastrointestinal:  Negative for constipation, diarrhea and nausea.   Genitourinary:  Negative for hematuria.   Musculoskeletal:  Negative for arthralgias, back pain, gait problem, joint swelling and myalgias.   Skin:  Negative for color change, rash and wound.   Allergic/Immunologic: Negative for environmental allergies, food allergies and immunocompromised state.   Neurological:  Negative for dizziness, weakness and light-headedness.   Psychiatric/Behavioral:  Negative for confusion, decreased concentration, dysphoric mood and sleep disturbance. The patient is not nervous/anxious.    Social History     Socioeconomic History    Marital status:    Tobacco Use    Smoking status: Never    Smokeless tobacco: Never   Vaping Use    Vaping Use: Never used   Substance and Sexual Activity    Alcohol use: No    Drug use: No    Sexual activity: Defer     Family History   Problem Relation Age of Onset    Heart disease Mother     Hypertension Mother     Heart disease Father     Hypertension Father     Hypertension Sister     Other Sister         malignant neoplasm    Mental illness Sister     Stroke Brother     Hypertension Brother     Mental illness Brother     Alzheimer's disease Other         Aunt     /78   Pulse 63   Ht 157.5 cm (62\")   Wt 67 kg (147 lb 9.6 oz)   SpO2 97%   BMI 27.00 kg/mý   Physical Exam  Vitals and nursing note reviewed.   Constitutional:       Appearance: Normal appearance. She is well-developed.   HENT:      Head: Normocephalic and atraumatic. "   Eyes:      General: Lids are normal.      Extraocular Movements: Extraocular movements intact.      Conjunctiva/sclera: Conjunctivae normal.      Pupils: Pupils are equal, round, and reactive to light.   Neck:      Thyroid: No thyroid mass or thyromegaly.      Vascular: No carotid bruit.      Trachea: Trachea normal. No tracheal deviation.   Cardiovascular:      Rate and Rhythm: Normal rate and regular rhythm.      Pulses: Normal pulses.      Heart sounds: Normal heart sounds. No murmur heard.    No friction rub. No gallop.   Pulmonary:      Effort: Pulmonary effort is normal. No respiratory distress.      Breath sounds: Normal breath sounds. No wheezing.   Musculoskeletal:         General: No deformity. Normal range of motion.      Cervical back: Normal range of motion and neck supple.   Lymphadenopathy:      Cervical: No cervical adenopathy.   Skin:     General: Skin is warm and dry.      Findings: No erythema or rash.      Nails: There is no clubbing.   Neurological:      General: No focal deficit present.      Mental Status: She is alert and oriented to person, place, and time.      Cranial Nerves: No cranial nerve deficit.      Deep Tendon Reflexes: Reflexes are normal and symmetric. Reflexes normal.   Psychiatric:         Mood and Affect: Mood normal.         Speech: Speech normal.         Behavior: Behavior normal.         Thought Content: Thought content normal.         Judgment: Judgment normal.     Results for orders placed or performed in visit on 02/06/23   Comprehensive Metabolic Panel    Specimen: Arm, Left; Blood   Result Value Ref Range    Glucose 88 65 - 99 mg/dL    BUN 14 8 - 23 mg/dL    Creatinine 0.76 0.57 - 1.00 mg/dL    Sodium 140 136 - 145 mmol/L    Potassium 4.2 3.5 - 5.2 mmol/L    Chloride 106 98 - 107 mmol/L    CO2 26.0 22.0 - 29.0 mmol/L    Calcium 9.3 8.6 - 10.5 mg/dL    Total Protein 6.5 6.0 - 8.5 g/dL    Albumin 4.3 3.5 - 5.2 g/dL    ALT (SGPT) 15 1 - 33 U/L    AST (SGOT) 19 1 - 32  U/L    Alkaline Phosphatase 82 39 - 117 U/L    Total Bilirubin 0.3 0.0 - 1.2 mg/dL    Globulin 2.2 gm/dL    A/G Ratio 2.0 g/dL    BUN/Creatinine Ratio 18.4 7.0 - 25.0    Anion Gap 8.0 5.0 - 15.0 mmol/L    eGFR 85.5 >60.0 mL/min/1.73   Lipid Panel    Specimen: Arm, Left; Blood   Result Value Ref Range    Total Cholesterol 249 (H) 0 - 200 mg/dL    Triglycerides 96 0 - 150 mg/dL    HDL Cholesterol 48 40 - 60 mg/dL    LDL Cholesterol  184 (H) 0 - 100 mg/dL    VLDL Cholesterol 17 5 - 40 mg/dL    LDL/HDL Ratio 3.79    TSH    Specimen: Arm, Left; Blood   Result Value Ref Range    TSH 0.490 0.270 - 4.200 uIU/mL   T4, Free    Specimen: Arm, Left; Blood   Result Value Ref Range    Free T4 1.73 (H) 0.93 - 1.70 ng/dL   Vitamin D,25-Hydroxy    Specimen: Arm, Left; Blood   Result Value Ref Range    25 Hydroxy, Vitamin D 46.3 30.0 - 100.0 ng/ml     Diagnoses and all orders for this visit:    1. Benign essential hypertension (Primary)  Assessment & Plan:  Bp is controlled taking lisinopril 10 mg daily   Check cmp     Orders:  -     Comprehensive Metabolic Panel; Future  -     Comprehensive Metabolic Panel    2. Mixed hyperlipidemia  Assessment & Plan:  Is eating low fat diet   Taking zocor 20 mg daily (now has resumed)  Check flp     Orders:  -     Lipid Panel; Future  -     Lipid Panel    3. Acquired hypothyroidism  Assessment & Plan:  Taking synthroid 88 mcg daily   Check tfts     Orders:  -     TSH; Future  -     T4, Free; Future  -     TSH  -     T4, Free    4. Avitaminosis D  Assessment & Plan:  Is taking supplement  Update vitamin D levels     Orders:  -     Vitamin D,25-Hydroxy; Future  -     Vitamin D,25-Hydroxy    Other orders  -     levothyroxine (SYNTHROID, LEVOTHROID) 88 MCG tablet; Take 1 tablet by mouth Daily.  Dispense: 90 tablet; Refill: 1  -     lisinopril (PRINIVIL,ZESTRIL) 10 MG tablet; Take 1 tablet by mouth Daily.  Dispense: 90 tablet; Refill: 1  -     simvastatin (ZOCOR) 20 MG tablet; Take 1 tablet by mouth  Daily.  Dispense: 90 tablet; Refill: 1  -     vitamin D (ERGOCALCIFEROL) 1.25 MG (13158 UT) capsule capsule; Take 1 capsule by mouth Every 7 (Seven) Days.  Dispense: 12 capsule; Refill: 1    Return in about 6 months (around 2/14/2024) for Recheck.    Tierney Mata MD  Signed Tierney Mata MD

## 2023-08-15 LAB
25(OH)D3 SERPL-MCNC: 53.4 NG/ML (ref 30–100)
ALBUMIN SERPL-MCNC: 4.6 G/DL (ref 3.5–5.2)
ALBUMIN/GLOB SERPL: 1.9 G/DL
ALP SERPL-CCNC: 82 U/L (ref 39–117)
ALT SERPL W P-5'-P-CCNC: 14 U/L (ref 1–33)
ANION GAP SERPL CALCULATED.3IONS-SCNC: 10 MMOL/L (ref 5–15)
AST SERPL-CCNC: 21 U/L (ref 1–32)
BILIRUB SERPL-MCNC: 0.4 MG/DL (ref 0–1.2)
BUN SERPL-MCNC: 10 MG/DL (ref 8–23)
BUN/CREAT SERPL: 13.3 (ref 7–25)
CALCIUM SPEC-SCNC: 9.6 MG/DL (ref 8.6–10.5)
CHLORIDE SERPL-SCNC: 104 MMOL/L (ref 98–107)
CHOLEST SERPL-MCNC: 153 MG/DL (ref 0–200)
CO2 SERPL-SCNC: 26 MMOL/L (ref 22–29)
CREAT SERPL-MCNC: 0.75 MG/DL (ref 0.57–1)
EGFRCR SERPLBLD CKD-EPI 2021: 86.8 ML/MIN/1.73
GLOBULIN UR ELPH-MCNC: 2.4 GM/DL
GLUCOSE SERPL-MCNC: 85 MG/DL (ref 65–99)
HDLC SERPL-MCNC: 48 MG/DL (ref 40–60)
LDLC SERPL CALC-MCNC: 87 MG/DL (ref 0–100)
LDLC/HDLC SERPL: 1.79 {RATIO}
POTASSIUM SERPL-SCNC: 4.4 MMOL/L (ref 3.5–5.2)
PROT SERPL-MCNC: 7 G/DL (ref 6–8.5)
SODIUM SERPL-SCNC: 140 MMOL/L (ref 136–145)
T4 FREE SERPL-MCNC: 1.71 NG/DL (ref 0.93–1.7)
TRIGL SERPL-MCNC: 95 MG/DL (ref 0–150)
TSH SERPL DL<=0.05 MIU/L-ACNC: 0.23 UIU/ML (ref 0.27–4.2)
VLDLC SERPL-MCNC: 18 MG/DL (ref 5–40)

## 2023-08-15 RX ORDER — LEVOTHYROXINE SODIUM 0.07 MG/1
75 TABLET ORAL DAILY
Qty: 90 TABLET | Refills: 1 | Status: SHIPPED | OUTPATIENT
Start: 2023-08-15

## 2023-12-18 RX ORDER — LEVOTHYROXINE SODIUM 88 UG/1
88 TABLET ORAL DAILY
Qty: 90 TABLET | Refills: 1 | OUTPATIENT
Start: 2023-12-18

## 2023-12-18 NOTE — TELEPHONE ENCOUNTER
Rx Refill Note    Requested Prescriptions     Pending Prescriptions Disp Refills    levothyroxine (SYNTHROID, LEVOTHROID) 88 MCG tablet [Pharmacy Med Name: LEVOTHYROXINE 88 MCG TABLET] 90 tablet 1     Sig: TAKE 1 TABLET BY MOUTH DAILY        Last office visit with prescribing clinician: 8/14/2023   h prescribing clinician: 2/19/2024    Refill denied for 88mcg because rx was decreased to 75mcg with last lab results

## 2024-02-15 RX ORDER — LISINOPRIL 10 MG/1
10 TABLET ORAL DAILY
Qty: 90 TABLET | Refills: 0 | Status: SHIPPED | OUTPATIENT
Start: 2024-02-15 | End: 2024-02-19 | Stop reason: SDUPTHER

## 2024-02-19 ENCOUNTER — OFFICE VISIT (OUTPATIENT)
Dept: ENDOCRINOLOGY | Facility: CLINIC | Age: 69
End: 2024-02-19
Payer: COMMERCIAL

## 2024-02-19 VITALS
HEIGHT: 62 IN | WEIGHT: 153 LBS | BODY MASS INDEX: 28.16 KG/M2 | HEART RATE: 64 BPM | DIASTOLIC BLOOD PRESSURE: 60 MMHG | SYSTOLIC BLOOD PRESSURE: 112 MMHG | OXYGEN SATURATION: 98 %

## 2024-02-19 DIAGNOSIS — E03.9 ACQUIRED HYPOTHYROIDISM: ICD-10-CM

## 2024-02-19 DIAGNOSIS — L65.9 HAIR LOSS: ICD-10-CM

## 2024-02-19 DIAGNOSIS — E78.2 MIXED HYPERLIPIDEMIA: ICD-10-CM

## 2024-02-19 DIAGNOSIS — E55.9 AVITAMINOSIS D: Primary | ICD-10-CM

## 2024-02-19 PROCEDURE — 80061 LIPID PANEL: CPT | Performed by: INTERNAL MEDICINE

## 2024-02-19 PROCEDURE — 83036 HEMOGLOBIN GLYCOSYLATED A1C: CPT | Performed by: INTERNAL MEDICINE

## 2024-02-19 PROCEDURE — 99214 OFFICE O/P EST MOD 30 MIN: CPT | Performed by: INTERNAL MEDICINE

## 2024-02-19 PROCEDURE — 84443 ASSAY THYROID STIM HORMONE: CPT | Performed by: INTERNAL MEDICINE

## 2024-02-19 PROCEDURE — 86038 ANTINUCLEAR ANTIBODIES: CPT | Performed by: INTERNAL MEDICINE

## 2024-02-19 PROCEDURE — 84439 ASSAY OF FREE THYROXINE: CPT | Performed by: INTERNAL MEDICINE

## 2024-02-19 PROCEDURE — 80053 COMPREHEN METABOLIC PANEL: CPT | Performed by: INTERNAL MEDICINE

## 2024-02-19 PROCEDURE — 83540 ASSAY OF IRON: CPT | Performed by: INTERNAL MEDICINE

## 2024-02-19 PROCEDURE — 82306 VITAMIN D 25 HYDROXY: CPT | Performed by: INTERNAL MEDICINE

## 2024-02-19 RX ORDER — SPIRONOLACTONE 50 MG/1
50 TABLET, FILM COATED ORAL DAILY
Qty: 90 TABLET | Refills: 1 | Status: SHIPPED | OUTPATIENT
Start: 2024-02-19 | End: 2025-02-18

## 2024-02-19 RX ORDER — OLMESARTAN MEDOXOMIL 20 MG/1
20 TABLET ORAL DAILY
Qty: 90 TABLET | Refills: 1 | Status: SHIPPED | OUTPATIENT
Start: 2024-02-19 | End: 2025-02-18

## 2024-02-19 RX ORDER — SIMVASTATIN 20 MG
20 TABLET ORAL DAILY
Qty: 90 TABLET | Refills: 1 | Status: SHIPPED | OUTPATIENT
Start: 2024-02-19

## 2024-02-19 RX ORDER — ERGOCALCIFEROL 1.25 MG/1
50000 CAPSULE ORAL
Qty: 12 CAPSULE | Refills: 1 | Status: SHIPPED | OUTPATIENT
Start: 2024-02-19

## 2024-02-19 RX ORDER — LEVOTHYROXINE SODIUM 0.07 MG/1
75 TABLET ORAL DAILY
Qty: 90 TABLET | Refills: 1 | Status: SHIPPED | OUTPATIENT
Start: 2024-02-19

## 2024-02-19 RX ORDER — LISINOPRIL 10 MG/1
10 TABLET ORAL DAILY
Qty: 90 TABLET | Refills: 1 | Status: SHIPPED | OUTPATIENT
Start: 2024-02-19 | End: 2024-02-19

## 2024-02-19 NOTE — PROGRESS NOTES
Nicky Owens 68 y.o.  CC:Follow-up, Hypothyroidism, Hypertension, Hyperlipidemia, and Vitamin D Deficiency    [place]: Follow-up, Hypothyroidism, Hypertension, Hyperlipidemia, and Vitamin D Deficiency    Is taking prinivil 10 mg daily   Is taking vitamin D supplement  Is eating low fat diet and taking zocor 20 mg daily   Noted hair loss, frontal     Allergies   Allergen Reactions    Penicillins        Current Outpatient Medications:     levothyroxine (SYNTHROID, LEVOTHROID) 75 MCG tablet, Take 1 tablet by mouth Daily., Disp: 90 tablet, Rfl: 1    simvastatin (ZOCOR) 20 MG tablet, Take 1 tablet by mouth Daily., Disp: 90 tablet, Rfl: 1    vitamin D (ERGOCALCIFEROL) 1.25 MG (35029 UT) capsule capsule, Take 1 capsule by mouth Every 7 (Seven) Days., Disp: 12 capsule, Rfl: 1    estradiol (MINIVELLE, VIVELLE-DOT) 0.05 MG/24HR patch, Place 1 patch on the skin as directed by provider 1 (One) Time Per Week., Disp: , Rfl:     olmesartan (Benicar) 20 MG tablet, Take 1 tablet by mouth Daily., Disp: 90 tablet, Rfl: 1    spironolactone (Aldactone) 50 MG tablet, Take 1 tablet by mouth Daily., Disp: 90 tablet, Rfl: 1    TOVIAZ 4 MG tablet sustained-release 24 hour tablet, Take 1 tablet by mouth Daily., Disp: , Rfl:   Patient Active Problem List    Diagnosis     Hair loss [L65.9]     Major depressive disorder with current active episode [F32.9]     Urinary urgency [R39.15]     Bruising [T14.8XXA]     Acute gastritis [K29.00]     Abdominal pain RUQ [R10.9]     Visit for screening mammogram [Z12.31]     Anemia [D64.9]     Anorexia [R63.0]     Anxiety [F41.9]     Benign essential hypertension [I10]     Bone cyst [M85.60]     H/O colonoscopy [Z98.890]     Degeneration of intervertebral disc of lumbar region [M51.36]     Dysphagia [R13.10]     Erosive esophagitis [K22.10]     Essential tremor [G25.0]     Hyperlipidemia [E78.5]     Hyperreflexia [R29.2]     Hypothyroidism [E03.9]     Multiple sclerosis [G35]     Heart murmur [R01.1]   "   Muscle weakness [M62.81]     Nausea [R11.0]     Nonallopathic lesion of cervical region [M99.9]     Osteoporosis [M81.0]     Shoulder pain [M25.519]     Avitaminosis D [E55.9]     Cobalamin deficiency [E53.8]      Review of Systems   Constitutional:  Negative for activity change, appetite change and unexpected weight change.   HENT:  Negative for congestion and rhinorrhea.    Eyes:  Negative for visual disturbance.   Respiratory:  Negative for cough and shortness of breath.    Cardiovascular:  Negative for palpitations and leg swelling.   Gastrointestinal:  Negative for constipation, diarrhea and nausea.   Genitourinary:  Negative for hematuria.   Musculoskeletal:  Negative for arthralgias, back pain, gait problem, joint swelling and myalgias.   Skin:  Negative for color change, rash and wound.   Allergic/Immunologic: Negative for environmental allergies, food allergies and immunocompromised state.   Neurological:  Negative for dizziness, weakness and light-headedness.   Psychiatric/Behavioral:  Negative for confusion, decreased concentration, dysphoric mood and sleep disturbance. The patient is not nervous/anxious.      Social History     Socioeconomic History    Marital status:    Tobacco Use    Smoking status: Never    Smokeless tobacco: Never   Vaping Use    Vaping Use: Never used   Substance and Sexual Activity    Alcohol use: No    Drug use: No    Sexual activity: Defer     Family History   Problem Relation Age of Onset    Heart disease Mother     Hypertension Mother     Heart disease Father     Hypertension Father     Hypertension Sister     Other Sister         malignant neoplasm    Mental illness Sister     Stroke Brother     Hypertension Brother     Mental illness Brother     Alzheimer's disease Other         Aunt     /60   Pulse 64   Ht 157.5 cm (62\")   Wt 69.4 kg (153 lb)   SpO2 98%   BMI 27.98 kg/m²     Physical Exam  Vitals and nursing note reviewed.   Constitutional:       " Appearance: Normal appearance. She is well-developed.   HENT:      Head: Normocephalic and atraumatic.   Eyes:      General: Lids are normal.      Extraocular Movements: Extraocular movements intact.      Conjunctiva/sclera: Conjunctivae normal.      Pupils: Pupils are equal, round, and reactive to light.   Neck:      Thyroid: No thyroid mass or thyromegaly.      Vascular: No carotid bruit.      Trachea: Trachea normal. No tracheal deviation.   Cardiovascular:      Rate and Rhythm: Normal rate and regular rhythm.      Pulses: Normal pulses.      Heart sounds: Normal heart sounds. No murmur heard.     No friction rub. No gallop.   Pulmonary:      Effort: Pulmonary effort is normal. No respiratory distress.      Breath sounds: Normal breath sounds. No wheezing.   Musculoskeletal:         General: No deformity. Normal range of motion.      Cervical back: Normal range of motion and neck supple.   Lymphadenopathy:      Cervical: No cervical adenopathy.   Skin:     General: Skin is warm and dry.      Findings: No erythema or rash.      Nails: There is no clubbing.   Neurological:      General: No focal deficit present.      Mental Status: She is alert and oriented to person, place, and time.      Cranial Nerves: No cranial nerve deficit.      Deep Tendon Reflexes: Reflexes are normal and symmetric. Reflexes normal.   Psychiatric:         Mood and Affect: Mood normal.         Speech: Speech normal.         Behavior: Behavior normal.         Thought Content: Thought content normal.         Judgment: Judgment normal.       Results for orders placed or performed in visit on 08/14/23   Comprehensive Metabolic Panel    Specimen: Blood   Result Value Ref Range    Glucose 85 65 - 99 mg/dL    BUN 10 8 - 23 mg/dL    Creatinine 0.75 0.57 - 1.00 mg/dL    Sodium 140 136 - 145 mmol/L    Potassium 4.4 3.5 - 5.2 mmol/L    Chloride 104 98 - 107 mmol/L    CO2 26.0 22.0 - 29.0 mmol/L    Calcium 9.6 8.6 - 10.5 mg/dL    Total Protein 7.0 6.0  - 8.5 g/dL    Albumin 4.6 3.5 - 5.2 g/dL    ALT (SGPT) 14 1 - 33 U/L    AST (SGOT) 21 1 - 32 U/L    Alkaline Phosphatase 82 39 - 117 U/L    Total Bilirubin 0.4 0.0 - 1.2 mg/dL    Globulin 2.4 gm/dL    A/G Ratio 1.9 g/dL    BUN/Creatinine Ratio 13.3 7.0 - 25.0    Anion Gap 10.0 5.0 - 15.0 mmol/L    eGFR 86.8 >60.0 mL/min/1.73   Lipid Panel    Specimen: Blood   Result Value Ref Range    Total Cholesterol 153 0 - 200 mg/dL    Triglycerides 95 0 - 150 mg/dL    HDL Cholesterol 48 40 - 60 mg/dL    LDL Cholesterol  87 0 - 100 mg/dL    VLDL Cholesterol 18 5 - 40 mg/dL    LDL/HDL Ratio 1.79    TSH    Specimen: Blood   Result Value Ref Range    TSH 0.227 (L) 0.270 - 4.200 uIU/mL   T4, Free    Specimen: Blood   Result Value Ref Range    Free T4 1.71 (H) 0.93 - 1.70 ng/dL   Vitamin D,25-Hydroxy    Specimen: Arm, Left; Blood   Result Value Ref Range    25 Hydroxy, Vitamin D 53.4 30.0 - 100.0 ng/ml     Diagnoses and all orders for this visit:    1. Avitaminosis D (Primary)  Assessment & Plan:  Continue supplement and update levels     Orders:  -     Vitamin D,25-Hydroxy; Future  -     Vitamin D,25-Hydroxy    2. Mixed hyperlipidemia  Assessment & Plan:  Continue diet, zocor and check flp     Orders:  -     Hemoglobin A1c; Future  -     Lipid Panel; Future  -     Hemoglobin A1c  -     Lipid Panel    3. Acquired hypothyroidism  Assessment & Plan:  Is taking synthroid 75 mcg daily   Check tfts      Orders:  -     Comprehensive Metabolic Panel; Future  -     T4, Free; Future  -     TSH; Future  -     Comprehensive Metabolic Panel  -     T4, Free  -     TSH    4. Hair loss  Assessment & Plan:  Check levels, trial spironolactone and change lisinopril to arb     Orders:  -     JUAN LUIS; Future  -     Iron; Future  -     JUAN LUIS  -     Iron    Other orders  -     vitamin D (ERGOCALCIFEROL) 1.25 MG (03252 UT) capsule capsule; Take 1 capsule by mouth Every 7 (Seven) Days.  Dispense: 12 capsule; Refill: 1  -     simvastatin (ZOCOR) 20 MG tablet; Take  1 tablet by mouth Daily.  Dispense: 90 tablet; Refill: 1  -     Discontinue: lisinopril (PRINIVIL,ZESTRIL) 10 MG tablet; Take 1 tablet by mouth Daily.  Dispense: 90 tablet; Refill: 1  -     levothyroxine (SYNTHROID, LEVOTHROID) 75 MCG tablet; Take 1 tablet by mouth Daily.  Dispense: 90 tablet; Refill: 1  -     spironolactone (Aldactone) 50 MG tablet; Take 1 tablet by mouth Daily.  Dispense: 90 tablet; Refill: 1  -     olmesartan (Benicar) 20 MG tablet; Take 1 tablet by mouth Daily.  Dispense: 90 tablet; Refill: 1    Return in about 6 months (around 8/19/2024) for Recheck.    Tierney Mata MD  Signed Tierney Mata MD

## 2024-02-20 LAB
25(OH)D3 SERPL-MCNC: 59.7 NG/ML (ref 30–100)
ALBUMIN SERPL-MCNC: 4.1 G/DL (ref 3.5–5.2)
ALBUMIN/GLOB SERPL: 1.6 G/DL
ALP SERPL-CCNC: 69 U/L (ref 39–117)
ALT SERPL W P-5'-P-CCNC: 16 U/L (ref 1–33)
ANA SER QL: NEGATIVE
ANION GAP SERPL CALCULATED.3IONS-SCNC: 10.8 MMOL/L (ref 5–15)
AST SERPL-CCNC: 19 U/L (ref 1–32)
BILIRUB SERPL-MCNC: 0.3 MG/DL (ref 0–1.2)
BUN SERPL-MCNC: 10 MG/DL (ref 8–23)
BUN/CREAT SERPL: 11.5 (ref 7–25)
CALCIUM SPEC-SCNC: 9.2 MG/DL (ref 8.6–10.5)
CHLORIDE SERPL-SCNC: 105 MMOL/L (ref 98–107)
CHOLEST SERPL-MCNC: 187 MG/DL (ref 0–200)
CO2 SERPL-SCNC: 25.2 MMOL/L (ref 22–29)
CREAT SERPL-MCNC: 0.87 MG/DL (ref 0.57–1)
EGFRCR SERPLBLD CKD-EPI 2021: 72.2 ML/MIN/1.73
GLOBULIN UR ELPH-MCNC: 2.6 GM/DL
GLUCOSE SERPL-MCNC: 80 MG/DL (ref 65–99)
HBA1C MFR BLD: 5.1 % (ref 4.8–5.6)
HDLC SERPL-MCNC: 56 MG/DL (ref 40–60)
IRON 24H UR-MRATE: 102 MCG/DL (ref 37–145)
LDLC SERPL CALC-MCNC: 115 MG/DL (ref 0–100)
LDLC/HDLC SERPL: 2.03 {RATIO}
POTASSIUM SERPL-SCNC: 4.3 MMOL/L (ref 3.5–5.2)
PROT SERPL-MCNC: 6.7 G/DL (ref 6–8.5)
SODIUM SERPL-SCNC: 141 MMOL/L (ref 136–145)
T4 FREE SERPL-MCNC: 1.34 NG/DL (ref 0.93–1.7)
TRIGL SERPL-MCNC: 87 MG/DL (ref 0–150)
TSH SERPL DL<=0.05 MIU/L-ACNC: 2 UIU/ML (ref 0.27–4.2)
VLDLC SERPL-MCNC: 16 MG/DL (ref 5–40)

## 2024-03-22 ENCOUNTER — OFFICE VISIT (OUTPATIENT)
Dept: NEUROLOGY | Facility: CLINIC | Age: 69
End: 2024-03-22
Payer: COMMERCIAL

## 2024-03-22 VITALS
BODY MASS INDEX: 27.97 KG/M2 | HEIGHT: 62 IN | OXYGEN SATURATION: 93 % | SYSTOLIC BLOOD PRESSURE: 118 MMHG | HEART RATE: 83 BPM | DIASTOLIC BLOOD PRESSURE: 78 MMHG | WEIGHT: 152 LBS

## 2024-03-22 DIAGNOSIS — G35 MULTIPLE SCLEROSIS: Primary | Chronic | ICD-10-CM

## 2024-03-22 DIAGNOSIS — G25.0 ESSENTIAL TREMOR: Chronic | ICD-10-CM

## 2024-03-22 DIAGNOSIS — R39.15 URINARY URGENCY: Chronic | ICD-10-CM

## 2024-03-22 DIAGNOSIS — F32.9 MAJOR DEPRESSIVE DISORDER WITH CURRENT ACTIVE EPISODE, UNSPECIFIED DEPRESSION EPISODE SEVERITY, UNSPECIFIED WHETHER RECURRENT: Chronic | ICD-10-CM

## 2024-03-22 PROCEDURE — 99214 OFFICE O/P EST MOD 30 MIN: CPT | Performed by: PSYCHIATRY & NEUROLOGY

## 2024-03-22 RX ORDER — BUPROPION HYDROCHLORIDE 150 MG/1
TABLET ORAL
COMMUNITY
End: 2024-03-22

## 2024-03-22 RX ORDER — MELOXICAM 15 MG/1
TABLET ORAL
COMMUNITY
Start: 2024-02-16

## 2024-03-22 NOTE — PROGRESS NOTES
"Chief Complaint    Multiple Sclerosis    Subjective        Nicky Owens presents to [facility] NEUROLOGY  History of Present Illness    68 y.o. female returns in follow up.  Last visit on 3/17/23 stable off DMT. .     MRI B/C 12/16/22 as compared to 8/28/2020 moderate T2 lesion load, no cord lesions, no new/enlarging/enhancing lesions.      MDD     Mood is stable off Wellbutrin.       RRMS     MSFC improved.       No new or worsening sx.       L UE and LE paresthesias resolved.      Fatigue intermittently present.      Essential Tremor     Tremor controlled     Urinary urgency      Toviaz improving frequency and urgrency       Objective   Vital Signs:  /78   Pulse 83   Ht 157.5 cm (62.01\")   Wt 68.9 kg (152 lb)   SpO2 93%   BMI 27.79 kg/m²   Estimated body mass index is 27.79 kg/m² as calculated from the following:    Height as of this encounter: 157.5 cm (62.01\").    Weight as of this encounter: 68.9 kg (152 lb).           Neurologic Exam     Mental Status   Oriented to person, place, and time.   Speech: speech is normal   Level of consciousness: alert  Knowledge: good and consistent with education.   Normal comprehension.     Cranial Nerves   Cranial nerves II through XII intact.     CN II   Visual fields full to confrontation.   Visual acuity: normal  Right visual field deficit: none  Left visual field deficit: none     CN III, IV, VI   Pupils are equal, round, and reactive to light.  Extraocular motions are normal.   Nystagmus: none   Diplopia: none  Ophthalmoparesis: none  Upgaze: normal  Downgaze: normal  Conjugate gaze: present    CN V   Facial sensation intact.   Right corneal reflex: normal  Left corneal reflex: normal    CN VII   Right facial weakness: none  Left facial weakness: none    CN VIII   Hearing: intact    CN IX, X   Palate: symmetric  Right gag reflex: normal  Left gag reflex: normal    CN XI   Right sternocleidomastoid strength: normal  Left " sternocleidomastoid strength: normal    CN XII   Tongue: not atrophic  Fasciculations: absent  Tongue deviation: none    Motor Exam   Muscle bulk: normal  Overall muscle tone: normal    Strength   Strength 5/5 throughout.     Sensory Exam   Light touch normal.     Gait, Coordination, and Reflexes     Gait  Gait: normal    Tremor   Resting tremor: absent  Intention tremor: absent  Action tremor: absent    Reflexes   Reflexes 2+ except as noted.        Physical Exam  Eyes:      Extraocular Movements: EOM normal.      Pupils: Pupils are equal, round, and reactive to light.   Neurological:      Mental Status: She is oriented to person, place, and time.      Cranial Nerves: Cranial nerves 2-12 are intact.      Motor: Motor strength is normal.     Gait: Gait is intact.   Psychiatric:         Speech: Speech normal.        Result Review :    The following data was reviewed by: Jorge L Donovan MD on 03/22/2024:  Common labs          8/14/2023    11:22 2/19/2024    10:42   Common Labs   Glucose 85  80    BUN 10  10    Creatinine 0.75  0.87    Sodium 140  141    Potassium 4.4  4.3    Chloride 104  105    Calcium 9.6  9.2    Albumin 4.6  4.1    Total Bilirubin 0.4  0.3    Alkaline Phosphatase 82  69    AST (SGOT) 21  19    ALT (SGPT) 14  16    Total Cholesterol 153  187    Triglycerides 95  87    HDL Cholesterol 48  56    LDL Cholesterol  87  115    Hemoglobin A1C  5.10                   Assessment and Plan     Diagnoses and all orders for this visit:    1. Multiple sclerosis (Primary)  Assessment & Plan:  Stable off DMT      2. Essential tremor  Assessment & Plan:  Stable       3. Major depressive disorder with current active episode, unspecified depression episode severity, unspecified whether recurrent    4. Urinary urgency  Assessment & Plan:  Toviaz                Follow Up     No follow-ups on file.  Patient was given instructions and counseling regarding her condition or for health maintenance advice. Please see  specific information pulled into the AVS if appropriate.

## 2024-03-22 NOTE — ASSESSMENT & PLAN NOTE
Stable    [No Acute Distress] : no acute distress [Well Nourished] : well nourished [Normal Sclera/Conjunctiva] : normal sclera/conjunctiva [PERRL] : pupils equal round and reactive to light [EOMI] : extraocular movements intact [Normal Outer Ear/Nose] : the outer ears and nose were normal in appearance [Normal Oropharynx] : the oropharynx was normal [No JVD] : no jugular venous distention [Supple] : supple [No Lymphadenopathy] : no lymphadenopathy [No Respiratory Distress] : no respiratory distress  [Clear to Auscultation] : lungs were clear to auscultation bilaterally [No Accessory Muscle Use] : no accessory muscle use [Normal Rate] : normal rate  [Regular Rhythm] : with a regular rhythm [Normal S1, S2] : normal S1 and S2 [Normal Posterior Cervical Nodes] : no posterior cervical lymphadenopathy [Normal Anterior Cervical Nodes] : no anterior cervical lymphadenopathy [Normal Affect] : the affect was normal [Normal Insight/Judgement] : insight and judgment were intact [de-identified] : BL TM bulging no erythema  TM intact BL  [de-identified] : Left groin and testicles no masses or bulging

## 2024-08-06 ENCOUNTER — TELEPHONE (OUTPATIENT)
Dept: NEUROLOGY | Facility: CLINIC | Age: 69
End: 2024-08-06
Payer: COMMERCIAL

## 2024-08-06 NOTE — TELEPHONE ENCOUNTER
Spoke with patient informed her unfortunately at this time I do not have any openings for Friday with . I have patient scheduled 8/13 @ 9am. Informed her I would check the schedule and if something does open up for Friday I will let her know. She verbalized understanding.

## 2024-08-06 NOTE — TELEPHONE ENCOUNTER
Provider: JONATHAN    Caller: RENZO    Phone Number: [phone]     Reason for Call: PT CALLED TO SEE IF THERE WAS IN WAY SHE COULD BE SEEN IN OFFICE ON FRIDAY 08/09/24. PT STATES SHE IS HAVING SOME ISSUES SUCH AS GOING TO THE RESTROOM SEVERAL TIMES THERW OUT THE NIGHT, PT IS HAVING A SENSATION IN HER BODY THAT IS NOT NORMAL ALONG WITH BEING VERY FATIGUED.PT IS WANTING TO KNOW IF ANYONE IF OFFICE CAN SEE HER FRIDAY IF POSSIBLE    PLEASE REVIEW AND ADVISE  THANK YOU

## 2024-08-13 ENCOUNTER — OFFICE VISIT (OUTPATIENT)
Dept: NEUROLOGY | Facility: CLINIC | Age: 69
End: 2024-08-13
Payer: COMMERCIAL

## 2024-08-13 VITALS
SYSTOLIC BLOOD PRESSURE: 108 MMHG | WEIGHT: 151.8 LBS | DIASTOLIC BLOOD PRESSURE: 62 MMHG | HEIGHT: 62 IN | BODY MASS INDEX: 27.94 KG/M2 | HEART RATE: 80 BPM | OXYGEN SATURATION: 100 %

## 2024-08-13 DIAGNOSIS — G25.0 ESSENTIAL TREMOR: Chronic | ICD-10-CM

## 2024-08-13 DIAGNOSIS — R39.15 URGENCY OF URINATION: ICD-10-CM

## 2024-08-13 DIAGNOSIS — F32.9 MAJOR DEPRESSIVE DISORDER WITH CURRENT ACTIVE EPISODE, UNSPECIFIED DEPRESSION EPISODE SEVERITY, UNSPECIFIED WHETHER RECURRENT: Chronic | ICD-10-CM

## 2024-08-13 DIAGNOSIS — G35 MULTIPLE SCLEROSIS: Primary | Chronic | ICD-10-CM

## 2024-08-13 DIAGNOSIS — R39.15 URINARY URGENCY: Chronic | ICD-10-CM

## 2024-08-13 PROCEDURE — 99214 OFFICE O/P EST MOD 30 MIN: CPT | Performed by: PSYCHIATRY & NEUROLOGY

## 2024-08-13 RX ORDER — FESOTERODINE FUMARATE 8 MG/1
8 TABLET, FILM COATED, EXTENDED RELEASE ORAL
Qty: 30 TABLET | Refills: 3 | Status: SHIPPED | OUTPATIENT
Start: 2024-08-13

## 2024-08-13 NOTE — PROGRESS NOTES
"Chief Complaint    Multiple Sclerosis    Subjective        Nicky Owens presents to [facility] NEUROLOGY  History of Present Illness    68 y.o. female returns in follow up.  Last visit on 3/22/24 stable off DMT. .     MRI B/C 12/16/22 as compared to 8/28/2020 moderate T2 lesion load, no cord lesions, no new/enlarging/enhancing lesions.      MDD     Mood is stable off Wellbutrin.       RRMS      No new or worsening sx.       L UE and LE paresthesias resolved.      Fatigue intermittently present.      Essential Tremor     Tremor controlled     Urinary urgency      Toviaz improving frequency and urgrency       Objective   Vital Signs:  /62   Pulse 80   Ht 157.5 cm (62.01\")   Wt 68.9 kg (151 lb 12.8 oz)   SpO2 100%   BMI 27.76 kg/m²   Estimated body mass index is 27.76 kg/m² as calculated from the following:    Height as of this encounter: 157.5 cm (62.01\").    Weight as of this encounter: 68.9 kg (151 lb 12.8 oz).        Neurologic Exam     Mental Status   Oriented to person, place, and time.   Speech: speech is normal   Level of consciousness: alert  Knowledge: good and consistent with education.   Normal comprehension.     Cranial Nerves   Cranial nerves II through XII intact.     CN II   Visual fields full to confrontation.   Visual acuity: normal  Right visual field deficit: none  Left visual field deficit: none     CN III, IV, VI   Pupils are equal, round, and reactive to light.  Extraocular motions are normal.   Nystagmus: none   Diplopia: none  Ophthalmoparesis: none  Upgaze: normal  Downgaze: normal  Conjugate gaze: present    CN V   Facial sensation intact.   Right corneal reflex: normal  Left corneal reflex: normal    CN VII   Right facial weakness: none  Left facial weakness: none    CN VIII   Hearing: intact    CN IX, X   Palate: symmetric  Right gag reflex: normal  Left gag reflex: normal    CN XI   Right sternocleidomastoid strength: normal  Left sternocleidomastoid " strength: normal    CN XII   Tongue: not atrophic  Fasciculations: absent  Tongue deviation: none    Motor Exam   Muscle bulk: normal  Overall muscle tone: normal    Strength   Strength 5/5 throughout.     Sensory Exam   Light touch normal.     Gait, Coordination, and Reflexes     Gait  Gait: normal    Tremor   Resting tremor: absent  Intention tremor: absent  Action tremor: absent    Reflexes   Reflexes 2+ except as noted.        Physical Exam  Eyes:      Extraocular Movements: EOM normal.      Pupils: Pupils are equal, round, and reactive to light.   Neurological:      Mental Status: She is oriented to person, place, and time.      Cranial Nerves: Cranial nerves 2-12 are intact.      Motor: Motor strength is normal.     Gait: Gait is intact.   Psychiatric:         Speech: Speech normal.        Result Review :  The following data was reviewed by: Jorge L Donovan MD on 08/13/2024:  Common labs          2/19/2024    10:42   Common Labs   Glucose 80    BUN 10    Creatinine 0.87    Sodium 141    Potassium 4.3    Chloride 105    Calcium 9.2    Albumin 4.1    Total Bilirubin 0.3    Alkaline Phosphatase 69    AST (SGOT) 19    ALT (SGPT) 16    Total Cholesterol 187    Triglycerides 87    HDL Cholesterol 56    LDL Cholesterol  115    Hemoglobin A1C 5.10                Assessment and Plan   Diagnoses and all orders for this visit:    1. Multiple sclerosis (Primary)  Assessment & Plan:  Stable off DMT       2. Essential tremor  Assessment & Plan:  No new or worsening sx      3. Major depressive disorder with current active episode, unspecified depression episode severity, unspecified whether recurrent    4. Urgency of urination  -     Ambulatory Referral to Urology    5. Urinary urgency  Assessment & Plan:  Worsening sx    Increase Toviaz 8 mg daily       Other orders  -     fesoterodine fumarate (Toviaz) 8 MG tablet sustained-release 24 hour tablet; Take 1 tablet by mouth Daily.  Dispense: 30 tablet; Refill: 3              Follow Up   No follow-ups on file.  Patient was given instructions and counseling regarding her condition or for health maintenance advice. Please see specific information pulled into the AVS if appropriate.

## 2024-08-15 RX ORDER — OLMESARTAN MEDOXOMIL 20 MG/1
20 TABLET ORAL DAILY
Qty: 90 TABLET | Refills: 1 | Status: SHIPPED | OUTPATIENT
Start: 2024-08-15 | End: 2025-08-15

## 2024-08-15 RX ORDER — LEVOTHYROXINE SODIUM 0.07 MG/1
75 TABLET ORAL DAILY
Qty: 90 TABLET | Refills: 1 | Status: SHIPPED | OUTPATIENT
Start: 2024-08-15

## 2024-08-15 NOTE — TELEPHONE ENCOUNTER
Rx Refill Note  Requested Prescriptions     Pending Prescriptions Disp Refills    levothyroxine (SYNTHROID, LEVOTHROID) 75 MCG tablet 30 tablet 1     Sig: Take 1 tablet by mouth Daily.    olmesartan (Benicar) 20 MG tablet 30 tablet 0     Sig: Take 1 tablet by mouth Daily.      Last office visit with prescribing clinician: 2/19/2024   Next office visit with prescribing clinician: 8/19/2024                           Ely Redding MA  08/15/24, 08:56 EDT

## 2024-08-15 NOTE — TELEPHONE ENCOUNTER
Prescription request for Levothyroxine 75 mcg. 90 day supply. Please send to [facility] in [place]

## 2024-09-03 NOTE — TELEPHONE ENCOUNTER
Rx Refill Note  Requested Prescriptions     Pending Prescriptions Disp Refills    spironolactone (Aldactone) 50 MG tablet 90 tablet 0     Sig: Take 1 tablet by mouth Daily.      Last office visit with prescribing clinician: 2/19/2024     Next office visit with prescribing clinician: 11/25/2024     Vee Camarena MA  09/03/24, 10:04 EDT

## 2024-09-05 RX ORDER — SPIRONOLACTONE 50 MG/1
50 TABLET, FILM COATED ORAL DAILY
Qty: 90 TABLET | Refills: 0 | Status: SHIPPED | OUTPATIENT
Start: 2024-09-05

## 2024-09-20 ENCOUNTER — OFFICE VISIT (OUTPATIENT)
Dept: UROLOGY | Facility: CLINIC | Age: 69
End: 2024-09-20
Payer: COMMERCIAL

## 2024-09-20 DIAGNOSIS — N32.81 OAB (OVERACTIVE BLADDER): Primary | ICD-10-CM

## 2024-11-25 ENCOUNTER — OFFICE VISIT (OUTPATIENT)
Dept: ENDOCRINOLOGY | Facility: CLINIC | Age: 69
End: 2024-11-25
Payer: COMMERCIAL

## 2024-11-25 VITALS
SYSTOLIC BLOOD PRESSURE: 110 MMHG | HEART RATE: 74 BPM | WEIGHT: 152 LBS | DIASTOLIC BLOOD PRESSURE: 62 MMHG | HEIGHT: 62 IN | BODY MASS INDEX: 27.97 KG/M2 | OXYGEN SATURATION: 99 %

## 2024-11-25 DIAGNOSIS — E55.9 AVITAMINOSIS D: ICD-10-CM

## 2024-11-25 DIAGNOSIS — E78.2 MIXED HYPERLIPIDEMIA: Primary | ICD-10-CM

## 2024-11-25 DIAGNOSIS — I10 BENIGN ESSENTIAL HYPERTENSION: ICD-10-CM

## 2024-11-25 DIAGNOSIS — E03.9 ACQUIRED HYPOTHYROIDISM: ICD-10-CM

## 2024-11-25 LAB
25(OH)D3 SERPL-MCNC: 56.8 NG/ML (ref 30–100)
ALBUMIN SERPL-MCNC: 4.1 G/DL (ref 3.5–5.2)
ALBUMIN/GLOB SERPL: 1.7 G/DL
ALP SERPL-CCNC: 66 U/L (ref 39–117)
ALT SERPL W P-5'-P-CCNC: 17 U/L (ref 1–33)
ANION GAP SERPL CALCULATED.3IONS-SCNC: 10 MMOL/L (ref 5–15)
AST SERPL-CCNC: 15 U/L (ref 1–32)
BILIRUB SERPL-MCNC: 0.2 MG/DL (ref 0–1.2)
BUN SERPL-MCNC: 17 MG/DL (ref 8–23)
BUN/CREAT SERPL: 14.7 (ref 7–25)
CALCIUM SPEC-SCNC: 9.5 MG/DL (ref 8.6–10.5)
CHLORIDE SERPL-SCNC: 106 MMOL/L (ref 98–107)
CHOLEST SERPL-MCNC: 159 MG/DL (ref 0–200)
CO2 SERPL-SCNC: 21 MMOL/L (ref 22–29)
CREAT SERPL-MCNC: 1.16 MG/DL (ref 0.57–1)
EGFRCR SERPLBLD CKD-EPI 2021: 51.1 ML/MIN/1.73
GLOBULIN UR ELPH-MCNC: 2.4 GM/DL
GLUCOSE SERPL-MCNC: 84 MG/DL (ref 65–99)
HBA1C MFR BLD: 5.5 % (ref 4.8–5.6)
HDLC SERPL-MCNC: 46 MG/DL (ref 40–60)
LDLC SERPL CALC-MCNC: 97 MG/DL (ref 0–100)
LDLC/HDLC SERPL: 2.09 {RATIO}
POTASSIUM SERPL-SCNC: 4.2 MMOL/L (ref 3.5–5.2)
PROT SERPL-MCNC: 6.5 G/DL (ref 6–8.5)
SODIUM SERPL-SCNC: 137 MMOL/L (ref 136–145)
T4 FREE SERPL-MCNC: 1.35 NG/DL (ref 0.92–1.68)
TRIGL SERPL-MCNC: 84 MG/DL (ref 0–150)
TSH SERPL DL<=0.05 MIU/L-ACNC: 5.33 UIU/ML (ref 0.27–4.2)
VLDLC SERPL-MCNC: 16 MG/DL (ref 5–40)

## 2024-11-25 PROCEDURE — 82306 VITAMIN D 25 HYDROXY: CPT | Performed by: INTERNAL MEDICINE

## 2024-11-25 PROCEDURE — 84439 ASSAY OF FREE THYROXINE: CPT | Performed by: INTERNAL MEDICINE

## 2024-11-25 PROCEDURE — 80061 LIPID PANEL: CPT | Performed by: INTERNAL MEDICINE

## 2024-11-25 PROCEDURE — 80053 COMPREHEN METABOLIC PANEL: CPT | Performed by: INTERNAL MEDICINE

## 2024-11-25 PROCEDURE — 83036 HEMOGLOBIN GLYCOSYLATED A1C: CPT | Performed by: INTERNAL MEDICINE

## 2024-11-25 PROCEDURE — 84443 ASSAY THYROID STIM HORMONE: CPT | Performed by: INTERNAL MEDICINE

## 2024-11-25 PROCEDURE — 99214 OFFICE O/P EST MOD 30 MIN: CPT | Performed by: INTERNAL MEDICINE

## 2024-11-25 RX ORDER — LEVOTHYROXINE SODIUM 75 UG/1
75 TABLET ORAL DAILY
Qty: 90 TABLET | Refills: 1 | Status: SHIPPED | OUTPATIENT
Start: 2024-11-25

## 2024-11-25 RX ORDER — ERGOCALCIFEROL 1.25 MG/1
50000 CAPSULE, LIQUID FILLED ORAL
Qty: 12 CAPSULE | Refills: 1 | Status: SHIPPED | OUTPATIENT
Start: 2024-11-25

## 2024-11-25 RX ORDER — LISINOPRIL 10 MG/1
10 TABLET ORAL DAILY
Qty: 90 TABLET | Refills: 1 | Status: SHIPPED | OUTPATIENT
Start: 2024-11-25 | End: 2025-11-25

## 2024-11-25 NOTE — PROGRESS NOTES
Nickyeris Owens 68 y.o.  CC: follow up Hypertension, hypothyroid     [place]: follow up hypertension, hypothyroid, hyperlipidemia    Bp is good  Would like to go back to lisinopril   Is using spironolactone for hair loss - no benefit   Is eating low fat diet   Is taking zocor 20 mg daily   Is taking vitamin D supplement     Allergies   Allergen Reactions    Penicillins        Current Outpatient Medications:     levothyroxine (SYNTHROID, LEVOTHROID) 75 MCG tablet, Take 1 tablet by mouth Daily., Disp: 90 tablet, Rfl: 1    vitamin D (ERGOCALCIFEROL) 1.25 MG (47446 UT) capsule capsule, Take 1 capsule by mouth Every 7 (Seven) Days., Disp: 12 capsule, Rfl: 1    estradiol (MINIVELLE, VIVELLE-DOT) 0.05 MG/24HR patch, Place 1 patch on the skin as directed by provider 1 (One) Time Per Week., Disp: , Rfl:     fesoterodine fumarate (Toviaz) 8 MG tablet sustained-release 24 hour tablet, Take 1 tablet by mouth Daily., Disp: 30 tablet, Rfl: 3    lisinopril (PRINIVIL,ZESTRIL) 10 MG tablet, Take 1 tablet by mouth Daily., Disp: 90 tablet, Rfl: 1    simvastatin (ZOCOR) 20 MG tablet, Take 1 tablet by mouth Daily., Disp: 90 tablet, Rfl: 1    Patient Active Problem List    Diagnosis     Hair loss [L65.9]     Major depressive disorder with current active episode [F32.9]     Urinary urgency [R39.15]     Bruising [T14.8XXA]     Acute gastritis [K29.00]     Abdominal pain RUQ [R10.9]     Visit for screening mammogram [Z12.31]     Anemia [D64.9]     Anorexia [R63.0]     Anxiety [F41.9]     Benign essential hypertension [I10]     Bone cyst [M85.60]     H/O colonoscopy [Z98.890]     Degeneration of intervertebral disc of lumbar region [M51.369]     Dysphagia [R13.10]     Erosive esophagitis [K22.10]     Essential tremor [G25.0]     Hyperlipidemia [E78.5]     Hyperreflexia [R29.2]     Hypothyroidism [E03.9]     Multiple sclerosis [G35]     Heart murmur [R01.1]     Muscle weakness [M62.81]     Nausea [R11.0]     Nonallopathic lesion of  "cervical region [M99.9]     Osteoporosis [M81.0]     Shoulder pain [M25.519]     Avitaminosis D [E55.9]     Cobalamin deficiency [E53.8]      Review of Systems   Constitutional:  Negative for activity change, appetite change and unexpected weight change.   HENT:  Negative for congestion and rhinorrhea.    Eyes:  Negative for visual disturbance.   Respiratory:  Negative for cough and shortness of breath.    Cardiovascular:  Negative for palpitations and leg swelling.   Gastrointestinal:  Negative for constipation, diarrhea and nausea.   Genitourinary:  Negative for hematuria.   Musculoskeletal:  Negative for arthralgias, back pain, gait problem, joint swelling and myalgias.   Skin:  Negative for color change, rash and wound.   Allergic/Immunologic: Negative for environmental allergies, food allergies and immunocompromised state.   Neurological:  Negative for dizziness, weakness and light-headedness.   Psychiatric/Behavioral:  Negative for confusion, decreased concentration, dysphoric mood and sleep disturbance. The patient is not nervous/anxious.        Social History     Socioeconomic History    Marital status:    Tobacco Use    Smoking status: Never     Passive exposure: Never    Smokeless tobacco: Never   Vaping Use    Vaping status: Never Used   Substance and Sexual Activity    Alcohol use: No    Drug use: No    Sexual activity: Defer     Family History   Problem Relation Age of Onset    Heart disease Mother     Hypertension Mother     Heart disease Father     Hypertension Father     Hypertension Sister     Other Sister         malignant neoplasm    Mental illness Sister     Stroke Brother     Hypertension Brother     Mental illness Brother     Alzheimer's disease Other         Aunt     /62   Pulse 74   Ht 157.5 cm (62\")   Wt 68.9 kg (152 lb)   SpO2 99%   BMI 27.80 kg/m²     Physical Exam  Vitals and nursing note reviewed.   Constitutional:       Appearance: Normal appearance. She is " well-developed.   HENT:      Head: Normocephalic and atraumatic.   Eyes:      General: Lids are normal.      Extraocular Movements: Extraocular movements intact.      Conjunctiva/sclera: Conjunctivae normal.      Pupils: Pupils are equal, round, and reactive to light.   Neck:      Thyroid: No thyroid mass or thyromegaly.      Vascular: No carotid bruit.      Trachea: Trachea normal. No tracheal deviation.   Cardiovascular:      Rate and Rhythm: Normal rate and regular rhythm.      Pulses: Normal pulses.      Heart sounds: Normal heart sounds. No murmur heard.     No friction rub. No gallop.   Pulmonary:      Effort: Pulmonary effort is normal. No respiratory distress.      Breath sounds: Normal breath sounds. No wheezing.   Musculoskeletal:         General: No deformity. Normal range of motion.      Cervical back: Normal range of motion and neck supple.   Lymphadenopathy:      Cervical: No cervical adenopathy.   Skin:     General: Skin is warm and dry.      Findings: No erythema or rash.      Nails: There is no clubbing.   Neurological:      General: No focal deficit present.      Mental Status: She is alert and oriented to person, place, and time.      Cranial Nerves: No cranial nerve deficit.      Deep Tendon Reflexes: Reflexes are normal and symmetric. Reflexes normal.   Psychiatric:         Mood and Affect: Mood normal.         Speech: Speech normal.         Behavior: Behavior normal.         Thought Content: Thought content normal.         Judgment: Judgment normal.       Results for orders placed or performed in visit on 02/19/24   Comprehensive Metabolic Panel    Collection Time: 02/19/24 10:42 AM    Specimen: Blood   Result Value Ref Range    Glucose 80 65 - 99 mg/dL    BUN 10 8 - 23 mg/dL    Creatinine 0.87 0.57 - 1.00 mg/dL    Sodium 141 136 - 145 mmol/L    Potassium 4.3 3.5 - 5.2 mmol/L    Chloride 105 98 - 107 mmol/L    CO2 25.2 22.0 - 29.0 mmol/L    Calcium 9.2 8.6 - 10.5 mg/dL    Total Protein 6.7 6.0  - 8.5 g/dL    Albumin 4.1 3.5 - 5.2 g/dL    ALT (SGPT) 16 1 - 33 U/L    AST (SGOT) 19 1 - 32 U/L    Alkaline Phosphatase 69 39 - 117 U/L    Total Bilirubin 0.3 0.0 - 1.2 mg/dL    Globulin 2.6 gm/dL    A/G Ratio 1.6 g/dL    BUN/Creatinine Ratio 11.5 7.0 - 25.0    Anion Gap 10.8 5.0 - 15.0 mmol/L    eGFR 72.2 >60.0 mL/min/1.73   Hemoglobin A1c    Collection Time: 02/19/24 10:42 AM    Specimen: Blood   Result Value Ref Range    Hemoglobin A1C 5.10 4.80 - 5.60 %   Lipid Panel    Collection Time: 02/19/24 10:42 AM    Specimen: Blood   Result Value Ref Range    Total Cholesterol 187 0 - 200 mg/dL    Triglycerides 87 0 - 150 mg/dL    HDL Cholesterol 56 40 - 60 mg/dL    LDL Cholesterol  115 (H) 0 - 100 mg/dL    VLDL Cholesterol 16 5 - 40 mg/dL    LDL/HDL Ratio 2.03    T4, Free    Collection Time: 02/19/24 10:42 AM    Specimen: Blood   Result Value Ref Range    Free T4 1.34 0.93 - 1.70 ng/dL   TSH    Collection Time: 02/19/24 10:42 AM    Specimen: Blood   Result Value Ref Range    TSH 2.000 0.270 - 4.200 uIU/mL   Vitamin D,25-Hydroxy    Collection Time: 02/19/24 10:42 AM    Specimen: Blood   Result Value Ref Range    25 Hydroxy, Vitamin D 59.7 30.0 - 100.0 ng/ml   JUAN LUIS    Collection Time: 02/19/24 10:42 AM    Specimen: Arm, Left; Blood   Result Value Ref Range    JUAN LUIS Direct Negative Negative   Iron    Collection Time: 02/19/24 10:42 AM    Specimen: Blood   Result Value Ref Range    Iron 102 37 - 145 mcg/dL     Diagnoses and all orders for this visit:    1. Mixed hyperlipidemia (Primary)  Assessment & Plan:  Eating low fat diet, taking zocor 20 mg daily   Check flp     Orders:  -     Comprehensive Metabolic Panel; Future  -     Lipid Panel; Future  -     Hemoglobin A1c; Future  -     Comprehensive Metabolic Panel  -     Lipid Panel  -     Hemoglobin A1c    2. Acquired hypothyroidism  Assessment & Plan:  Continue synthroid 75 mcg daily   Check tfts     Orders:  -     T4, Free; Future  -     TSH; Future  -     Hemoglobin A1c;  Future  -     T4, Free  -     TSH  -     Hemoglobin A1c    3. Avitaminosis D  Assessment & Plan:  Continue supplement- taking 50,000 u weekly     Orders:  -     Vitamin D,25-Hydroxy; Future  -     Vitamin D,25-Hydroxy    4. Benign essential hypertension  Assessment & Plan:  BP is well controlled- continue lisinopril      Other orders  -     vitamin D (ERGOCALCIFEROL) 1.25 MG (43920 UT) capsule capsule; Take 1 capsule by mouth Every 7 (Seven) Days.  Dispense: 12 capsule; Refill: 1  -     levothyroxine (SYNTHROID, LEVOTHROID) 75 MCG tablet; Take 1 tablet by mouth Daily.  Dispense: 90 tablet; Refill: 1  -     lisinopril (PRINIVIL,ZESTRIL) 10 MG tablet; Take 1 tablet by mouth Daily.  Dispense: 90 tablet; Refill: 1    Return in about 6 months (around 5/25/2025) for Recheck.    Electronically signed by: Tierney Mata MD

## 2024-12-02 RX ORDER — SPIRONOLACTONE 50 MG/1
50 TABLET, FILM COATED ORAL DAILY
Qty: 90 TABLET | Refills: 0 | OUTPATIENT
Start: 2024-12-02

## 2024-12-03 DIAGNOSIS — E03.9 ACQUIRED HYPOTHYROIDISM: Primary | ICD-10-CM

## 2024-12-03 RX ORDER — LEVOTHYROXINE SODIUM 88 UG/1
88 TABLET ORAL DAILY
Qty: 90 TABLET | Refills: 1 | Status: SHIPPED | OUTPATIENT
Start: 2024-12-03 | End: 2025-12-03

## 2025-01-29 RX ORDER — FESOTERODINE FUMARATE 8 MG/1
8 TABLET, FILM COATED, EXTENDED RELEASE ORAL
Qty: 30 TABLET | Refills: 3 | Status: SHIPPED | OUTPATIENT
Start: 2025-01-29

## 2025-01-29 NOTE — TELEPHONE ENCOUNTER
Rx Refill Note  Requested Prescriptions     Pending Prescriptions Disp Refills    fesoterodine fumarate (TOVIAZ ER) 8 MG tablet sustained-release 24 hour tablet [Pharmacy Med Name: FESOTERODINE ER 8 MG TABLET] 30 tablet 3     Sig: TAKE 1 TABLET BY MOUTH DAILY      Last filled:  08/13/24 w/3  Last office visit with prescribing clinician: 8/13/2024      Next office visit with prescribing clinician: 03/08/25     Jolene Anthony MA  01/29/25, 08:33 EST

## 2025-02-10 RX ORDER — SIMVASTATIN 20 MG
20 TABLET ORAL DAILY
Qty: 90 TABLET | Refills: 1 | Status: SHIPPED | OUTPATIENT
Start: 2025-02-10

## 2025-02-10 NOTE — TELEPHONE ENCOUNTER
Rx Refill Note  Requested Prescriptions     Pending Prescriptions Disp Refills    simvastatin (ZOCOR) 20 MG tablet [Pharmacy Med Name: SIMVASTATIN 20 MG TABLET] 90 tablet 1     Sig: TAKE 1 TABLET BY MOUTH DAILY      Last office visit with prescribing clinician: 11/25/2024     Next office visit with prescribing clinician: 6/2/2025   {Rigo Barrios MA  02/10/25, 13:43 EST

## 2025-02-18 ENCOUNTER — TELEPHONE (OUTPATIENT)
Dept: ENDOCRINOLOGY | Facility: CLINIC | Age: 70
End: 2025-02-18
Payer: COMMERCIAL

## 2025-02-18 NOTE — TELEPHONE ENCOUNTER
Last office visit with prescribing clinician: 11/25/2024       Next office visit with prescribing clinician: 6/2/2025

## 2025-02-18 NOTE — TELEPHONE ENCOUNTER
Called the pt and she stated the script was changed to Lisinopril. She stated to disregard the request.

## 2025-02-21 ENCOUNTER — OFFICE VISIT (OUTPATIENT)
Dept: UROLOGY | Facility: CLINIC | Age: 70
End: 2025-02-21
Payer: COMMERCIAL

## 2025-02-21 DIAGNOSIS — N32.81 OAB (OVERACTIVE BLADDER): Primary | ICD-10-CM

## 2025-02-21 NOTE — PROGRESS NOTES
"     Follow Up Office Visit      Patient Name: Nicky Owens  : 1955   MRN: [id number]     Chief Complaint:    Chief Complaint   Patient presents with    OAB (overactive bladder)       Referring Provider: No ref. provider found    History of Present Illness: Nicky Owens is a 69 y.o. female who presents today for follow up of OAB.  She reports the Myrbetriq did not help as much.  She has since increased her Toviaz to 8 mg.  She has not obtained a sleep study yet.   She reports she is doing better than her last visit.  She is currently doing pretty well from a urologic standpoint.  Her last Cr increased and she is due to see Dr. Mata in 2 months.    No dysuria or gross hematuria.      Subjective      Review of System:   Review of Systems   I have reviewed the ROS documented by my clinical staff, I have updated appropriately and I agree. Rich Benítez MD    I have reviewed and the following portions of the patient's history were updated as appropriate: past family history, past medical history, past social history, past surgical history and problem list.    Past Medical History:   Past Medical History:   Diagnosis Date    Back pain     Last Impression: 2015  discussed nsaidpain not typical for shinglespossibly due to recent car trip- back strainuse heat and icegabapentin sent for use prncheck ua, cmp, etc  Tierney Mata (Endocrinology)    Bone cyst     hip    Dysphagia      \" egd 9/15 gastritis without gastropathy    Essential tremor     Last Impression: 2015  only since surgery- some cogwheelingcheck medications in hospitalcontinue valium prnrefer neurology  Tierney Mata (Endocrinology)    Generalized weakness     Heart murmur     High cholesterol     History of colon polyps     History of mammogram 2013    [facility]    History of Papanicolaou smear of cervix     Hyperreflexia     Hypertension     Multiple sclerosis     Muscle weakness     Last " Impression: 18 May 2015  check cmp, cbc, tshsuspect tsh is still lowdifficult time recovering from surgery check ekgconsider cat scan head if symptoms headache and weakness persist  Tierney Mata (Endocrinology)    Neuropathy     Nonallopathic lesion of cervical region     RUQ abdominal pain     Shoulder pain     Weakness of both legs        Past Surgical History:  Past Surgical History:   Procedure Laterality Date    CHOLECYSTECTOMY      COLONOSCOPY  07/31/2012    HYSTERECTOMY      TONSILLECTOMY         Family History:   family history includes Alzheimer's disease in an other family member; Heart disease in her father and mother; Hypertension in her brother, father, mother, and sister; Mental illness in her brother and sister; Other in her sister; Stroke in her brother.   Otherwise pertinent FHx was reviewed and not pertinent to current issue.    Social History:    reports that she has never smoked. She has never been exposed to tobacco smoke. She has never used smokeless tobacco. She reports that she does not drink alcohol and does not use drugs.    Medications:     Current Outpatient Medications:     estradiol (MINIVELLE, VIVELLE-DOT) 0.05 MG/24HR patch, Place 1 patch on the skin as directed by provider 1 (One) Time Per Week., Disp: , Rfl:     fesoterodine fumarate (TOVIAZ ER) 8 MG tablet sustained-release 24 hour tablet, TAKE 1 TABLET BY MOUTH DAILY, Disp: 30 tablet, Rfl: 3    levothyroxine (Synthroid) 88 MCG tablet, Take 1 tablet by mouth Daily., Disp: 90 tablet, Rfl: 1    lisinopril (PRINIVIL,ZESTRIL) 10 MG tablet, Take 1 tablet by mouth Daily., Disp: 90 tablet, Rfl: 1    simvastatin (ZOCOR) 20 MG tablet, TAKE 1 TABLET BY MOUTH DAILY, Disp: 90 tablet, Rfl: 1    vitamin D (ERGOCALCIFEROL) 1.25 MG (05581 UT) capsule capsule, Take 1 capsule by mouth Every 7 (Seven) Days., Disp: 12 capsule, Rfl: 1    Allergies:   Allergies   Allergen Reactions    Penicillins            Objective     Physical Exam:   Vital  "Signs: There were no vitals filed for this visit.  There is no height or weight on file to calculate BMI.     Physical Exam  Vitals and nursing note reviewed. Exam conducted with a chaperone present.   Constitutional:       General: She is awake. She is not in acute distress.     Appearance: Normal appearance.   HENT:      Head: Normocephalic and atraumatic.      Right Ear: External ear normal.      Left Ear: External ear normal.      Nose: Nose normal.   Eyes:      Conjunctiva/sclera: Conjunctivae normal.   Pulmonary:      Effort: Pulmonary effort is normal. No respiratory distress.   Abdominal:      General: Abdomen is flat. There is no distension.      Palpations: Abdomen is soft. There is no mass.      Tenderness: There is no abdominal tenderness. There is no right CVA tenderness, left CVA tenderness, guarding or rebound.      Hernia: No hernia is present.   Genitourinary:     Exam position: Lithotomy position.   Skin:     General: Skin is warm.   Neurological:      General: No focal deficit present.      Mental Status: She is alert and oriented to person, place, and time.      Gait: Gait normal.   Psychiatric:         Behavior: Behavior normal. Behavior is cooperative.         Thought Content: Thought content normal.         Judgment: Judgment normal.         Labs:   Brief Urine Lab Results       None                 Lab Results   Component Value Date    GLUCOSE 84 11/25/2024    CALCIUM 9.5 11/25/2024     11/25/2024    K 4.2 11/25/2024    CO2 21.0 (L) 11/25/2024     11/25/2024    BUN 17 11/25/2024    CREATININE 1.16 (H) 11/25/2024    EGFRIFNONA 73 01/10/2022    BCR 14.7 11/25/2024    ANIONGAP 10.0 11/25/2024       Lab Results   Component Value Date    WBC 5.65 10/22/2021    HGB 13.5 10/22/2021    HCT 40.8 10/22/2021    MCV 89.1 10/22/2021     10/22/2021       No results found for: \"PSA\"    Images:   X-ray chest PA and lateral    Result Date: 12/25/2024  1. Vertical scar or atelectasis at " medial left base. Images reviewed, interpreted, and dictated by Hiren Luu MD    MAMMO SCREENING DIGITAL TOMOSYNTHESIS BILATERAL W CAD    Result Date: 11/7/2024  FINAL IMPRESSION: ACR BI-RADS 2: Benign findings. RECOMMENDATIONS: Routine annual screening mammography. A letter including results and recommendations was sent to the patient. Density notification was provided to all patients. Patient information entered into a reminder system with a target due date for the next mammogram. At our facility, a [place] marker is positioned over a visible skin lesion and a linear marker is used to indicate a scar. A triangular marker is placed on a self reported palpable finding. Note: Mammography does not detect approximately 10-15% of breast cancers. An annual clinical breast exam by the patient's breast care physician and regular monthly self breast exams by the patient are integral parts of breast cancer screening, in addition to annual mammography. A normal mammogram does not completely exclude the presence of breast cancer, especially if there is an abnormal finding on physical exam. When clinically indicated, a biopsy should not be deferred because of a normal mammogram report. cc:       Measures:   Tobacco:   Nicky Owens  reports that she has never smoked. She has never been exposed to tobacco smoke. She has never used smokeless tobacco.     Urine Incontinence: Patient reports that she is currently experiencing any symptoms of urinary incontinence.         Assessment / Plan      Assessment/Plan:   69 y.o. female who presented today for follow up of OAB.  She has done well on Toviaz 8 mg and would like to continue.  I will see her back in 6 months.  I again encouraged her to get a sleep study through her PCP.      Diagnoses and all orders for this visit:    1. OAB (overactive bladder) (Primary)         Follow Up:   Return in about 6 months (around 8/21/2025) for Recheck.    I spent approximately 30 minutes  providing clinical care for this patient; including review of patient's chart and provider documentation, face to face time spent with patient in examination room (obtaining history, performing physical exam, discussing diagnosis and management options), placing orders, and completing patient documentation.     Rich Benítez MD  [facility] [place]

## 2025-03-28 ENCOUNTER — OFFICE VISIT (OUTPATIENT)
Dept: NEUROLOGY | Facility: CLINIC | Age: 70
End: 2025-03-28
Payer: COMMERCIAL

## 2025-03-28 VITALS
HEART RATE: 65 BPM | DIASTOLIC BLOOD PRESSURE: 84 MMHG | OXYGEN SATURATION: 99 % | WEIGHT: 153 LBS | BODY MASS INDEX: 28.16 KG/M2 | HEIGHT: 62 IN | SYSTOLIC BLOOD PRESSURE: 138 MMHG

## 2025-03-28 DIAGNOSIS — R39.15 URINARY URGENCY: Chronic | ICD-10-CM

## 2025-03-28 DIAGNOSIS — F32.9 MAJOR DEPRESSIVE DISORDER WITH CURRENT ACTIVE EPISODE, UNSPECIFIED DEPRESSION EPISODE SEVERITY, UNSPECIFIED WHETHER RECURRENT: Chronic | ICD-10-CM

## 2025-03-28 DIAGNOSIS — G35 MULTIPLE SCLEROSIS: Primary | Chronic | ICD-10-CM

## 2025-03-28 DIAGNOSIS — G25.0 ESSENTIAL TREMOR: Chronic | ICD-10-CM

## 2025-03-28 PROCEDURE — 99214 OFFICE O/P EST MOD 30 MIN: CPT | Performed by: PSYCHIATRY & NEUROLOGY

## 2025-03-28 RX ORDER — KETOCONAZOLE 20 MG/G
CREAM TOPICAL
COMMUNITY

## 2025-03-28 RX ORDER — MONTELUKAST SODIUM 10 MG/1
TABLET ORAL
COMMUNITY
End: 2025-03-28

## 2025-03-28 RX ORDER — PRIMIDONE 50 MG/1
TABLET ORAL
COMMUNITY
End: 2025-03-28

## 2025-03-28 RX ORDER — TERBINAFINE HYDROCHLORIDE 250 MG/1
TABLET ORAL
COMMUNITY
End: 2025-03-28

## 2025-03-28 RX ORDER — DIMETHYL FUMARATE 240 MG/1
CAPSULE ORAL
COMMUNITY
End: 2025-03-28

## 2025-03-28 NOTE — PROGRESS NOTES
"Chief Complaint    Multiple Sclerosis    Subjective        Nicky Owens presents to [facility] NEUROLOGY  History of Present Illness    69 y.o. female returns in follow up.  Last visit on 8/13/24 stable off DMT, referred to Urology, increased Toviaz.       MRI B/C 12/16/22 as compared to 8/28/2020 moderate T2 lesion load, no cord lesions, no new/enlarging/enhancing lesions.      Retired February 14. 2025.      MDD     Mood is stable off meds     RRMS      No new or worsening sx.       Fatigue intermittently present.      Essential Tremor     Tremor controlled     Urinary urgency      Toviaz improving frequency and urgrency       Objective   Vital Signs:  /84   Pulse 65   Ht 157.5 cm (62.01\")   Wt 69.4 kg (153 lb)   SpO2 99%   BMI 27.98 kg/m²   Estimated body mass index is 27.98 kg/m² as calculated from the following:    Height as of this encounter: 157.5 cm (62.01\").    Weight as of this encounter: 69.4 kg (153 lb).        Neurological Exam  Mental Status  Awake, alert and oriented to person, place and time. Oriented to person, place and time. Speech is normal. Language is fluent with no aphasia. Attention and concentration are normal. Fund of knowledge is appropriate for level of education.    Cranial Nerves  CN III, IV, VI: Extraocular movements intact bilaterally. Pupils equal round and reactive to light bilaterally.  CN V: Facial sensation is normal.  CN VII: Full and symmetric facial movement.  CN IX, X: Palate elevates symmetrically  CN XI: Shoulder shrug strength is normal.  CN XII: Tongue midline without atrophy or fasciculations.    Motor   Strength is 5/5 throughout all four extremities.    Sensory  Sensation is intact to light touch, pinprick, vibration and proprioception in all four extremities.    Reflexes  Deep tendon reflexes are 2+ and symmetric in all four extremities.    Coordination    Finger-to-nose, rapid alternating movements and heel-to-shin normal " bilaterally without dysmetria.    Gait  Normal casual, toe, heel and tandem gait.       Physical Exam  Eyes:      Extraocular Movements: Extraocular movements intact.      Pupils: Pupils are equal, round, and reactive to light.   Neurological:      Motor: Motor strength is normal.     Coordination: Coordination is intact.      Deep Tendon Reflexes: Reflexes are normal and symmetric.   Psychiatric:         Speech: Speech normal.        Result Review :  The following data was reviewed by: Jorge L Donovan MD on 03/28/2025:  Common labs          11/25/2024    08:09   Common Labs   Glucose 84    BUN 17    Creatinine 1.16    Sodium 137    Potassium 4.2    Chloride 106    Calcium 9.5    Albumin 4.1    Total Bilirubin 0.2    Alkaline Phosphatase 66    AST (SGOT) 15    ALT (SGPT) 17    Total Cholesterol 159    Triglycerides 84    HDL Cholesterol 46    LDL Cholesterol  97    Hemoglobin A1C 5.50                Assessment and Plan   Diagnoses and all orders for this visit:    1. Multiple sclerosis (Primary)    2. Essential tremor    3. Major depressive disorder with current active episode, unspecified depression episode severity, unspecified whether recurrent  Assessment & Plan:  Mood is stable off meds      4. Urinary urgency  Assessment & Plan:  Sx stable on Toviaz 8 mg daily                Follow Up   No follow-ups on file.  Patient was given instructions and counseling regarding her condition or for health maintenance advice. Please see specific information pulled into the AVS if appropriate.

## 2025-05-19 RX ORDER — LISINOPRIL 10 MG/1
10 TABLET ORAL DAILY
Qty: 90 TABLET | Refills: 0 | Status: SHIPPED | OUTPATIENT
Start: 2025-05-19 | End: 2026-05-19

## 2025-05-19 NOTE — TELEPHONE ENCOUNTER
Rx Refill Note  Requested Prescriptions     Pending Prescriptions Disp Refills    lisinopril (PRINIVIL,ZESTRIL) 10 MG tablet 90 tablet 1     Sig: Take 1 tablet by mouth Daily.      Last office visit with prescribing clinician: 11/25/2024     Next office visit with prescribing clinician: 6/2/2025

## 2025-06-02 ENCOUNTER — OFFICE VISIT (OUTPATIENT)
Dept: ENDOCRINOLOGY | Facility: CLINIC | Age: 70
End: 2025-06-02
Payer: COMMERCIAL

## 2025-06-02 VITALS
BODY MASS INDEX: 28.71 KG/M2 | WEIGHT: 156 LBS | SYSTOLIC BLOOD PRESSURE: 126 MMHG | HEIGHT: 62 IN | OXYGEN SATURATION: 100 % | HEART RATE: 59 BPM | DIASTOLIC BLOOD PRESSURE: 80 MMHG

## 2025-06-02 DIAGNOSIS — Z76.89 ENCOUNTER TO ESTABLISH CARE: ICD-10-CM

## 2025-06-02 DIAGNOSIS — R60.0 LOCALIZED EDEMA: ICD-10-CM

## 2025-06-02 DIAGNOSIS — R06.09 DYSPNEA ON EXERTION: ICD-10-CM

## 2025-06-02 DIAGNOSIS — E03.9 ACQUIRED HYPOTHYROIDISM: ICD-10-CM

## 2025-06-02 DIAGNOSIS — E78.2 MIXED HYPERLIPIDEMIA: Primary | ICD-10-CM

## 2025-06-02 DIAGNOSIS — I10 BENIGN ESSENTIAL HYPERTENSION: ICD-10-CM

## 2025-06-02 PROCEDURE — 84439 ASSAY OF FREE THYROXINE: CPT | Performed by: INTERNAL MEDICINE

## 2025-06-02 PROCEDURE — 80053 COMPREHEN METABOLIC PANEL: CPT | Performed by: INTERNAL MEDICINE

## 2025-06-02 PROCEDURE — 83880 ASSAY OF NATRIURETIC PEPTIDE: CPT | Performed by: INTERNAL MEDICINE

## 2025-06-02 PROCEDURE — 80061 LIPID PANEL: CPT | Performed by: INTERNAL MEDICINE

## 2025-06-02 PROCEDURE — 84443 ASSAY THYROID STIM HORMONE: CPT | Performed by: INTERNAL MEDICINE

## 2025-06-02 PROCEDURE — 99214 OFFICE O/P EST MOD 30 MIN: CPT | Performed by: INTERNAL MEDICINE

## 2025-06-02 RX ORDER — LISINOPRIL 10 MG/1
10 TABLET ORAL DAILY
Qty: 90 TABLET | Refills: 1 | Status: SHIPPED | OUTPATIENT
Start: 2025-06-02 | End: 2026-06-02

## 2025-06-02 RX ORDER — SIMVASTATIN 20 MG
20 TABLET ORAL DAILY
Qty: 90 TABLET | Refills: 1 | Status: SHIPPED | OUTPATIENT
Start: 2025-06-02 | End: 2025-06-06 | Stop reason: SDUPTHER

## 2025-06-02 NOTE — PROGRESS NOTES
Nicky Yeh Graciela 69 y.o.  [facility]: follow up Hypothyroid, Hyperlipidemia, hypertension    [place]: follow up Hypothyroid, Hyperlipidemia, Hypertension    Noted hair loss- now taking minoxidil orally   Energy is poor- some poole  Also more swelling in legs- has pos fam h/o CAD   Noting breathlessness with conversation as well   Bp is good today   Is back to lisinopril- no benefit with change to olmesartan and spironolactone (hair- wise)     Allergies   Allergen Reactions    Penicillins Other (See Comments)     convulsions       Current Outpatient Medications:     estradiol (MINIVELLE, VIVELLE-DOT) 0.05 MG/24HR patch, Place 1 patch on the skin as directed by provider 1 (One) Time Per Week., Disp: , Rfl:     fesoterodine fumarate (TOVIAZ ER) 8 MG tablet sustained-release 24 hour tablet, TAKE 1 TABLET BY MOUTH DAILY, Disp: 30 tablet, Rfl: 3    ketoconazole (NIZORAL) 2 % cream, , Disp: , Rfl:     levothyroxine (Synthroid) 88 MCG tablet, Take 1 tablet by mouth Daily., Disp: 90 tablet, Rfl: 1    lisinopril (PRINIVIL,ZESTRIL) 10 MG tablet, Take 1 tablet by mouth Daily., Disp: 90 tablet, Rfl: 1    simvastatin (ZOCOR) 20 MG tablet, TAKE 1 TABLET BY MOUTH DAILY, Disp: 90 tablet, Rfl: 1    vitamin D (ERGOCALCIFEROL) 1.25 MG (40221 UT) capsule capsule, Take 1 capsule by mouth Every 7 (Seven) Days., Disp: 12 capsule, Rfl: 1    Patient Active Problem List    Diagnosis     Localized edema [R60.0]     Dyspnea on exertion [R06.09]     Hair loss [L65.9]     Major depressive disorder with current active episode [F32.9]     Urinary urgency [R39.15]     Bruising [T14.8XXA]     Acute gastritis [K29.00]     Abdominal pain RUQ [R10.9]     Encounter to establish care [Z76.89]     Anemia [D64.9]     Anorexia [R63.0]     Anxiety [F41.9]     Benign essential hypertension [I10]     Bone cyst [M85.60]     H/O colonoscopy [Z98.890]     Degeneration of intervertebral disc of lumbar region [M51.369]     Dysphagia [R13.10]     Erosive esophagitis [K22.10]      Essential tremor [G25.0]     Hyperlipidemia [E78.5]     Hyperreflexia [R29.2]     Hypothyroidism [E03.9]     Multiple sclerosis [G35]     Heart murmur [R01.1]     Muscle weakness [M62.81]     Nausea [R11.0]     Nonallopathic lesion of cervical region [M99.9]     Osteoporosis [M81.0]     Shoulder pain [M25.519]     Avitaminosis D [E55.9]     Cobalamin deficiency [E53.8]      Review of Systems   Constitutional:  Negative for activity change, appetite change and unexpected weight change.   HENT:  Negative for congestion and rhinorrhea.    Eyes:  Negative for visual disturbance.   Respiratory:  Positive for shortness of breath. Negative for cough.    Cardiovascular:  Positive for leg swelling. Negative for palpitations.   Gastrointestinal:  Negative for constipation, diarrhea and nausea.   Genitourinary:  Negative for hematuria.   Musculoskeletal:  Positive for arthralgias. Negative for back pain, gait problem, joint swelling and myalgias.   Skin:  Negative for color change, rash and wound.   Allergic/Immunologic: Negative for environmental allergies, food allergies and immunocompromised state.   Neurological:  Negative for dizziness, weakness and light-headedness.   Psychiatric/Behavioral:  Negative for confusion, decreased concentration, dysphoric mood and sleep disturbance. The patient is not nervous/anxious.      Social History     Socioeconomic History    Marital status:    Tobacco Use    Smoking status: Never     Passive exposure: Never    Smokeless tobacco: Never   Vaping Use    Vaping status: Never Used   Substance and Sexual Activity    Alcohol use: No    Drug use: No    Sexual activity: Defer     Family History   Problem Relation Age of Onset    Heart disease Mother     Hypertension Mother     Heart disease Father     Hypertension Father     Hypertension Sister     Other Sister         malignant neoplasm    Mental illness Sister     Stroke Brother     Hypertension Brother     Mental illness Brother   "   Alzheimer's disease Other         Aunt     /80   Pulse 59   Ht 157.5 cm (62.01\")   Wt 70.8 kg (156 lb)   SpO2 100%   BMI 28.53 kg/m²   Physical Exam  Vitals and nursing note reviewed.   Constitutional:       Appearance: Normal appearance. She is well-developed.   HENT:      Head: Normocephalic and atraumatic.   Eyes:      General: Lids are normal.      Extraocular Movements: Extraocular movements intact.      Conjunctiva/sclera: Conjunctivae normal.      Pupils: Pupils are equal, round, and reactive to light.   Neck:      Thyroid: No thyroid mass or thyromegaly.      Vascular: No carotid bruit.      Trachea: Trachea normal. No tracheal deviation.   Cardiovascular:      Rate and Rhythm: Normal rate and regular rhythm.      Heart sounds: Normal heart sounds. No murmur heard.     No friction rub. No gallop.   Pulmonary:      Effort: Pulmonary effort is normal. No respiratory distress.      Breath sounds: Normal breath sounds. No wheezing.   Musculoskeletal:         General: No deformity. Normal range of motion.      Cervical back: Normal range of motion and neck supple.      Right lower leg: Edema present.      Left lower leg: Edema present.   Lymphadenopathy:      Cervical: No cervical adenopathy.   Skin:     General: Skin is warm and dry.      Findings: No erythema or rash.      Nails: There is no clubbing.   Neurological:      Mental Status: She is alert and oriented to person, place, and time.      Cranial Nerves: No cranial nerve deficit.      Deep Tendon Reflexes: Reflexes are normal and symmetric. Reflexes normal.   Psychiatric:         Speech: Speech normal.         Behavior: Behavior normal.         Thought Content: Thought content normal.         Judgment: Judgment normal.       Results for orders placed or performed in visit on 11/25/24   T4, Free    Collection Time: 11/25/24  8:09 AM    Specimen: Blood   Result Value Ref Range    Free T4 1.35 0.92 - 1.68 ng/dL   TSH    Collection Time: " 11/25/24  8:09 AM    Specimen: Blood   Result Value Ref Range    TSH 5.330 (H) 0.270 - 4.200 uIU/mL   Comprehensive Metabolic Panel    Collection Time: 11/25/24  8:09 AM    Specimen: Blood   Result Value Ref Range    Glucose 84 65 - 99 mg/dL    BUN 17 8 - 23 mg/dL    Creatinine 1.16 (H) 0.57 - 1.00 mg/dL    Sodium 137 136 - 145 mmol/L    Potassium 4.2 3.5 - 5.2 mmol/L    Chloride 106 98 - 107 mmol/L    CO2 21.0 (L) 22.0 - 29.0 mmol/L    Calcium 9.5 8.6 - 10.5 mg/dL    Total Protein 6.5 6.0 - 8.5 g/dL    Albumin 4.1 3.5 - 5.2 g/dL    ALT (SGPT) 17 1 - 33 U/L    AST (SGOT) 15 1 - 32 U/L    Alkaline Phosphatase 66 39 - 117 U/L    Total Bilirubin 0.2 0.0 - 1.2 mg/dL    Globulin 2.4 gm/dL    A/G Ratio 1.7 g/dL    BUN/Creatinine Ratio 14.7 7.0 - 25.0    Anion Gap 10.0 5.0 - 15.0 mmol/L    eGFR 51.1 (L) >60.0 mL/min/1.73   Lipid Panel    Collection Time: 11/25/24  8:09 AM    Specimen: Blood   Result Value Ref Range    Total Cholesterol 159 0 - 200 mg/dL    Triglycerides 84 0 - 150 mg/dL    HDL Cholesterol 46 40 - 60 mg/dL    LDL Cholesterol  97 0 - 100 mg/dL    VLDL Cholesterol 16 5 - 40 mg/dL    LDL/HDL Ratio 2.09    Hemoglobin A1c    Collection Time: 11/25/24  8:09 AM    Specimen: Blood   Result Value Ref Range    Hemoglobin A1C 5.50 4.80 - 5.60 %   Vitamin D,25-Hydroxy    Collection Time: 11/25/24  8:09 AM    Specimen: Arm, Left; Blood   Result Value Ref Range    25 Hydroxy, Vitamin D 56.8 30.0 - 100.0 ng/ml     Diagnoses and all orders for this visit:    1. Mixed hyperlipidemia (Primary)  Assessment & Plan:  Taking zocor 20 mg daily   Check flp     Orders:  -     Lipid Panel; Future  -     Ambulatory Referral to Cardiology for Hypertension  -     Lipid Panel    2. Acquired hypothyroidism  Assessment & Plan:  Continue synthroid 88 mcg daily   Check tfts     Orders:  -     T4, Free; Future  -     TSH; Future  -     T4, Free  -     TSH    3. Benign essential hypertension  Assessment & Plan:  Bp is controlled- refill  lisinopril     Orders:  -     Comprehensive Metabolic Panel; Future  -     Ambulatory Referral to Cardiology for Hypertension  -     Comprehensive Metabolic Panel    4. Localized edema  Assessment & Plan:  Legs- worse with recent travel - left more than right- check doppler     Orders:  -     Duplex Venous Lower Extremity - Bilateral CAR; Future  -     proBNP; Future  -     Ambulatory Referral to Cardiology for Hypertension  -     proBNP    5. Dyspnea on exertion  Assessment & Plan:  With associated swelling- may be related to low dose minoxidil but does have family h/o CAD which is significant  Check BNP and refer cardio - stress test, echo      Orders:  -     Ambulatory Referral to Cardiology for Hypertension    6. Encounter to establish care  -     Cancel: Ambulatory Referral to Family Practice  -     Ambulatory Referral to Family Practice    Other orders  -     lisinopril (PRINIVIL,ZESTRIL) 10 MG tablet; Take 1 tablet by mouth Daily.  Dispense: 90 tablet; Refill: 1    No follow-ups on file.    Electronically signed by: Tierney Mtaa MD

## 2025-06-02 NOTE — ASSESSMENT & PLAN NOTE
With associated swelling- may be related to low dose minoxidil but does have family h/o CAD which is significant  Check BNP and refer cardio - stress test, echo

## 2025-06-03 LAB
ALBUMIN SERPL-MCNC: 4.2 G/DL (ref 3.5–5.2)
ALBUMIN/GLOB SERPL: 1.6 G/DL
ALP SERPL-CCNC: 74 U/L (ref 39–117)
ALT SERPL W P-5'-P-CCNC: 18 U/L (ref 1–33)
ANION GAP SERPL CALCULATED.3IONS-SCNC: 10.3 MMOL/L (ref 5–15)
AST SERPL-CCNC: 26 U/L (ref 1–32)
BILIRUB SERPL-MCNC: 0.3 MG/DL (ref 0–1.2)
BUN SERPL-MCNC: 12 MG/DL (ref 8–23)
BUN/CREAT SERPL: 11.4 (ref 7–25)
CALCIUM SPEC-SCNC: 9.3 MG/DL (ref 8.6–10.5)
CHLORIDE SERPL-SCNC: 105 MMOL/L (ref 98–107)
CHOLEST SERPL-MCNC: 235 MG/DL (ref 0–200)
CO2 SERPL-SCNC: 22.7 MMOL/L (ref 22–29)
CREAT SERPL-MCNC: 1.05 MG/DL (ref 0.57–1)
EGFRCR SERPLBLD CKD-EPI 2021: 57.3 ML/MIN/1.73
GLOBULIN UR ELPH-MCNC: 2.7 GM/DL
GLUCOSE SERPL-MCNC: 82 MG/DL (ref 65–99)
HDLC SERPL-MCNC: 55 MG/DL (ref 40–60)
LDLC SERPL CALC-MCNC: 164 MG/DL (ref 0–100)
LDLC/HDLC SERPL: 2.94 {RATIO}
NT-PROBNP SERPL-MCNC: 85 PG/ML (ref 0–900)
POTASSIUM SERPL-SCNC: 4.2 MMOL/L (ref 3.5–5.2)
PROT SERPL-MCNC: 6.9 G/DL (ref 6–8.5)
SODIUM SERPL-SCNC: 138 MMOL/L (ref 136–145)
T4 FREE SERPL-MCNC: 1.39 NG/DL (ref 0.92–1.68)
TRIGL SERPL-MCNC: 92 MG/DL (ref 0–150)
TSH SERPL DL<=0.05 MIU/L-ACNC: 10.2 UIU/ML (ref 0.27–4.2)
VLDLC SERPL-MCNC: 16 MG/DL (ref 5–40)

## 2025-06-04 ENCOUNTER — RESULTS FOLLOW-UP (OUTPATIENT)
Dept: ENDOCRINOLOGY | Facility: CLINIC | Age: 70
End: 2025-06-04
Payer: COMMERCIAL

## 2025-06-04 DIAGNOSIS — E03.9 ACQUIRED HYPOTHYROIDISM: Primary | ICD-10-CM

## 2025-06-04 RX ORDER — LEVOTHYROXINE SODIUM 100 UG/1
100 TABLET ORAL DAILY
Qty: 90 TABLET | Refills: 1 | Status: SHIPPED | OUTPATIENT
Start: 2025-06-04 | End: 2026-06-04

## 2025-06-04 NOTE — LETTER
Nicky Yeh Dobson  [address] [facility] [address]    June 4, 2025     Dear Ms. Owens:    Below are the results from your recent visit:    Resulted Orders   T4, Free   Result Value Ref Range    Free T4 1.39 0.92 - 1.68 ng/dL   TSH   Result Value Ref Range    TSH 10.200 (H) 0.270 - 4.200 uIU/mL   Comprehensive Metabolic Panel   Result Value Ref Range    Glucose 82 65 - 99 mg/dL    BUN 12.0 8.0 - 23.0 mg/dL    Creatinine 1.05 (H) 0.57 - 1.00 mg/dL    Sodium 138 136 - 145 mmol/L    Potassium 4.2 3.5 - 5.2 mmol/L    Chloride 105 98 - 107 mmol/L    CO2 22.7 22.0 - 29.0 mmol/L    Calcium 9.3 8.6 - 10.5 mg/dL    Total Protein 6.9 6.0 - 8.5 g/dL    Albumin 4.2 3.5 - 5.2 g/dL    ALT (SGPT) 18 1 - 33 U/L    AST (SGOT) 26 1 - 32 U/L    Alkaline Phosphatase 74 39 - 117 U/L    Total Bilirubin 0.3 0.0 - 1.2 mg/dL    Globulin 2.7 gm/dL    A/G Ratio 1.6 g/dL    BUN/Creatinine Ratio 11.4 7.0 - 25.0    Anion Gap 10.3 5.0 - 15.0 mmol/L    eGFR 57.3 (L) >60.0 mL/min/1.73   Lipid Panel   Result Value Ref Range    Total Cholesterol 235 (H) 0 - 200 mg/dL    Triglycerides 92 0 - 150 mg/dL    HDL Cholesterol 55 40 - 60 mg/dL    LDL Cholesterol  164 (H) 0 - 100 mg/dL    VLDL Cholesterol 16 5 - 40 mg/dL    LDL/HDL Ratio 2.94    proBNP   Result Value Ref Range    proBNP 85.0 0.0 - 900.0 pg/mL       TSH is elevated- increase supplement to 100 mcg daily  Cholesterol is high - be sure you are taking your zocor at dinner or later daily   Kidney function is mildly low - we will monitor and I would avoid motrin/alleve/advil and try to drink plenty of fluids     If you have any questions or concerns, please don't hesitate to call.         Sincerely,        Tierney Mata MD

## 2025-06-06 ENCOUNTER — OFFICE VISIT (OUTPATIENT)
Age: 70
End: 2025-06-06
Payer: COMMERCIAL

## 2025-06-06 VITALS
HEIGHT: 62 IN | HEART RATE: 80 BPM | TEMPERATURE: 97 F | WEIGHT: 153.38 LBS | SYSTOLIC BLOOD PRESSURE: 132 MMHG | BODY MASS INDEX: 28.22 KG/M2 | OXYGEN SATURATION: 97 % | DIASTOLIC BLOOD PRESSURE: 80 MMHG

## 2025-06-06 DIAGNOSIS — E03.9 ACQUIRED HYPOTHYROIDISM: ICD-10-CM

## 2025-06-06 DIAGNOSIS — M79.89 LEG SWELLING: ICD-10-CM

## 2025-06-06 DIAGNOSIS — I10 BENIGN ESSENTIAL HYPERTENSION: ICD-10-CM

## 2025-06-06 DIAGNOSIS — R06.02 SHORTNESS OF BREATH: ICD-10-CM

## 2025-06-06 DIAGNOSIS — G35 MULTIPLE SCLEROSIS: Chronic | ICD-10-CM

## 2025-06-06 DIAGNOSIS — N17.9 AKI (ACUTE KIDNEY INJURY): ICD-10-CM

## 2025-06-06 DIAGNOSIS — E78.2 MIXED HYPERLIPIDEMIA: Primary | ICD-10-CM

## 2025-06-06 DIAGNOSIS — R39.15 URINARY URGENCY: Chronic | ICD-10-CM

## 2025-06-06 PROBLEM — F32.9 MAJOR DEPRESSIVE DISORDER WITH CURRENT ACTIVE EPISODE: Chronic | Status: RESOLVED | Noted: 2019-06-03 | Resolved: 2025-06-06

## 2025-06-06 LAB
BILIRUB BLD-MCNC: NEGATIVE MG/DL
CLARITY, POC: CLEAR
COLOR UR: YELLOW
EXPIRATION DATE: ABNORMAL
GLUCOSE UR STRIP-MCNC: NEGATIVE MG/DL
KETONES UR QL: NEGATIVE
LEUKOCYTE EST, POC: ABNORMAL
Lab: ABNORMAL
NITRITE UR-MCNC: NEGATIVE MG/ML
PH UR: 6 [PH] (ref 5–8)
PROT UR STRIP-MCNC: NEGATIVE MG/DL
RBC # UR STRIP: NEGATIVE /UL
SP GR UR: 1.01 (ref 1–1.03)
UROBILINOGEN UR QL: ABNORMAL

## 2025-06-06 RX ORDER — SIMVASTATIN 40 MG
40 TABLET ORAL DAILY
Qty: 90 TABLET | Refills: 3 | Status: SHIPPED | OUTPATIENT
Start: 2025-06-06

## 2025-06-06 NOTE — PROGRESS NOTES
Office Note     Name: Nicky Owens    : 1955     MRN: [id number]     Chief Complaint  Establish Care, Leg Swelling, Hypothyroidism, and Hyperlipidemia    Subjective         History of Present Illness  The patient is a 69-year-old female presenting to establish care and discuss leg swelling.    She reports hair loss attributed to her MS medication. Prescribed minoxidil by her dermatologist on 2025, she started it a week later. Her legs became swollen 1 mo after starting the medication, with significant swelling over a day. Her sister suggested ER due to family heart disease. Elevating her feet helped. She feels breathless when talking for extended periods. She continues oral minoxidil 2.5 mg.    Diagnosed with MS in  after tingling in her back. Initially treated with Tecfidera, switched due to flushing. Discontinued MS medication due to age and lack of relapses. Currently not on MS medication.    Under Dr. Newell' care for thyroid condition for 30 years, takes Synthroid in the morning.    Experiencing bladder issues, including nocturia. Takes Toviaz 8 mg, prescribed by Dr. Null, who performed bladder repair during hysterectomy in . Referred to urologist by Dr. Donovan. Difficulty urinating within hours of taking Toviaz, no urgency later. Continues Toviaz 8 mg.    On lisinopril for hypertension. Switched to olmesartan and another medication due to hair loss, returned to lisinopril after no improvement.     Concerned about cholesterol levels. Missed simvastatin refill for at least 7 days. Gained weight since retiring in 2025, sedentary. Sensible eater, avoids fast food and fried foods. Typical breakfast: plain Greek yogurt with fresh berries and granola, coffee with half and half. Eats rotisserie chicken and baked sweet potato with butter at 4:00 PM. Occasionally indulges in cheeseburgers.    A1c levels trending higher.    Decrease in kidney function based on recent  "labs. No new medications between February 2024 and November 2024, no increased use of ibuprofen or Aleve. Took Allegra during this period.    Underwent cholecystectomy for gallbladder disease, experienced bilateral tremors post-surgery, worked from home.    SOCIAL HISTORY  Worked as a  until 2013, then full-time at [employer] until retiring in February 2025.    FAMILY HISTORY  She has a family history of heart disease.        Past Medical History:   Past Medical History:   Diagnosis Date    Back pain     Last Impression: 03 Apr 2015  discussed nsaidpain not typical for shinglespossibly due to recent car trip- back strainuse heat and icegabapentin sent for use prncheck ua, cmp, etc  Tierney Mata (Endocrinology)    Bone cyst     hip    Dysphagia      \" egd 9/15 gastritis without gastropathy    Essential tremor     Last Impression: 08 Jun 2015  only since surgery- some cogwheelingcheck medications in hospitalcontinue valium prnrefer neurology  Tierney Mata (Endocrinology)    Generalized weakness     Heart murmur     High cholesterol     History of colon polyps     History of mammogram 12/01/2013    [place]    History of Papanicolaou smear of cervix     Hyperreflexia     Hypertension     Multiple sclerosis     Muscle weakness     Last Impression: 18 May 2015  check cmp, cbc, tshsuspect tsh is still lowdifficult time recovering from surgery check ekgconsider cat scan head if symptoms headache and weakness persist  Tierney Mata (Endocrinology)    Neuropathy     Nonallopathic lesion of cervical region     RUQ abdominal pain     Shoulder pain     Weakness of both legs        Past Surgical History:   Past Surgical History:   Procedure Laterality Date    CHOLECYSTECTOMY      COLONOSCOPY  07/31/2012    HYSTERECTOMY      TONSILLECTOMY         Immunizations:   Immunization History   Administered Date(s) Administered    COVID-19 (PFIZER) Purple Cap Monovalent " 02/22/2021, 03/15/2021, 10/11/2021    Flu Vaccine Intradermal Quad 18-64YR 10/21/2020    Flu Vaccine Quad PF >36MO 10/23/2017    FluMist 2-49yrs 12/27/2013    Fluzone  >6mos 12/23/2010    Fluzone (or Fluarix & Flulaval for VFC) >6mos 03/25/2022    Fluzone High-Dose 65+yrs 11/19/2022    Fluzone Quad >6mos (Multi-dose) 10/23/2017    Hepatitis A 11/29/2018, 05/31/2019    Shingrix 09/10/2022, 11/05/2022    flucelvax quad pfs =>4 YRS 12/06/2019        Medications:     Current Outpatient Medications:     estradiol (MINIVELLE, VIVELLE-DOT) 0.05 MG/24HR patch, Place 1 patch on the skin as directed by provider 1 (One) Time Per Week., Disp: , Rfl:     fesoterodine fumarate (TOVIAZ ER) 8 MG tablet sustained-release 24 hour tablet, TAKE 1 TABLET BY MOUTH DAILY, Disp: 30 tablet, Rfl: 3    levothyroxine (Synthroid) 100 MCG tablet, Take 1 tablet by mouth Daily., Disp: 90 tablet, Rfl: 1    lisinopril (PRINIVIL,ZESTRIL) 10 MG tablet, Take 1 tablet by mouth Daily., Disp: 90 tablet, Rfl: 1    simvastatin (ZOCOR) 40 MG tablet, Take 1 tablet by mouth Daily., Disp: 90 tablet, Rfl: 3    vitamin D (ERGOCALCIFEROL) 1.25 MG (45238 UT) capsule capsule, Take 1 capsule by mouth Every 7 (Seven) Days., Disp: 12 capsule, Rfl: 1    ketoconazole (NIZORAL) 2 % cream, , Disp: , Rfl:     Allergies:   Allergies   Allergen Reactions    Penicillins Other (See Comments)     convulsions       Family History:   Family History   Problem Relation Age of Onset    Heart disease Mother     Hypertension Mother     Heart disease Father     Hypertension Father     Hypertension Sister     Other Sister         malignant neoplasm    Mental illness Sister     Stroke Brother     Hypertension Brother     Mental illness Brother     Alzheimer's disease Other         Aunt       Social History:   Social History     Socioeconomic History    Marital status:    Tobacco Use    Smoking status: Never     Passive exposure: Never    Smokeless tobacco: Never   Vaping Use     "Vaping status: Never Used   Substance and Sexual Activity    Alcohol use: No    Drug use: No    Sexual activity: Defer         Objective     Vital Signs  /80 (BP Location: Right arm, Patient Position: Sitting, Cuff Size: Adult)   Pulse 80   Temp 97 °F (36.1 °C) (Oral)   Ht 157 cm (61.81\")   Wt 69.6 kg (153 lb 6 oz)   SpO2 97%   BMI 28.22 kg/m²   Estimated body mass index is 28.22 kg/m² as calculated from the following:    Height as of this encounter: 157 cm (61.81\").    Weight as of this encounter: 69.6 kg (153 lb 6 oz).          Physical Exam  Vitals reviewed.   Constitutional:       Appearance: Normal appearance.   HENT:      Head: Normocephalic and atraumatic.   Cardiovascular:      Rate and Rhythm: Normal rate.   Pulmonary:      Effort: Pulmonary effort is normal. No respiratory distress.   Neurological:      General: No focal deficit present.      Mental Status: She is alert and oriented to person, place, and time.   Psychiatric:         Mood and Affect: Mood normal.         Behavior: Behavior normal.          Assessment and Plan     Diagnoses and all orders for this visit:    1. Mixed hyperlipidemia (Primary)  -     simvastatin (ZOCOR) 40 MG tablet; Take 1 tablet by mouth Daily.  Dispense: 90 tablet; Refill: 3  -     Lipid panel; Future    2. Shortness of breath  -     ECG 12 Lead  -     XR Chest 2 View; Future    3. ERNIE (acute kidney injury)  -     POCT urinalysis dipstick, automated    4. Multiple sclerosis    5. Leg swelling    6. Urinary urgency    7. Benign essential hypertension    8. Acquired hypothyroidism         Assessment & Plan  Leg swelling likely due to venous insufficiency   - Likely due to venous insufficiency. Oral minoxidil coincides with onset, unclear correlation.  - Normal BNP levels, ruling out heart failure.  - UA with no protein   - TSH mildly elevated, not to the degree that would cause leg swelling   Plan:   - Recommend compression socks during travel or extended " standing.  - Discontinue oral minoxidil, replace with topical minoxidil 5%.   - Has doppler of LE ordered and scheduled     Multiple sclerosis (MS)  - Currently not on MS medications. Previous hair loss linked to MS medication.  - No recent attacks, decided to stay off MS drugs.  - Follows up with neurology at [facility].    Overactive bladder  - Takes Toviaz 8 mg, reports difficulty urinating within hours of taking, effective later.  - History of bladder repair surgery during hysterectomy in 2005.  - Referred to urologist by Dr. Donovan.  - Continue Toviaz 8 mg daily.    Hypertension  - On lisinopril, previously switched due to hair loss, returned after no improvement.  - Continue lisinopril.    Cholesterol management  - Increase simvastatin to 40 mg daily. Recheck lipid panel in 2 months.  - Sensible eater, avoids fast food and fried foods.  - Prescription for simvastatin 40 mg sent to pharmacy.    ERNIE vs CKD   - Decline in kidney function, possibly due to age or hypertension.  - POC UA without proteinuria or hematuria, continue to monitor   - Has been stable over the past year     Hypothyroidism   - Continue follow-up with Dr. Newell.  - Take Synthroid on an empty stomach, at least an hour before other medications or food.  - Discuss declining thyroid function with Dr. Newell.    Follow-up  - Follow up in 7 months for annual visit.    Follow Up  Return in about 7 months (around 1/6/2026) for Annual physical.    Patient or patient representative verbalized consent for the use of Ambient Listening during the visit with  Marisol Mascorro MD for chart documentation. 6/6/2025  10:36 EDT        ECG 12 Lead    Date/Time: 6/6/2025 10:37 AM  Performed by: Marisol Mascorro MD    Authorized by: Marisol Mascorro MD  Comparison: not compared with previous ECG   Previous ECG: no previous ECG available  Rhythm: sinus rhythm  Rate: normal  QRS axis: left    Clinical impression:  abnormal EKG        Time:  47 min of face to face and non-face to face time on day of visit in collection of history, documentation, and .       Marisol Mascorro MD   [facility]  [facility]  [address] [address] [phone]    Please note that portions of this document may have been completed with a voice recognition program.      At [facility], we believe that sharing information builds trust and better relationships. You are receiving this note because you are receiving care at [facility] or have recently visited. It is possible you will see health information before a provider has talked with you about it. This kind of information can be easy to misunderstand as it is a medical document. It is intended as [id number] communication. It is written in medical language and may contain abbreviations or verbiage that are unfamiliar. It may appear blunt or direct. Medical documents are intended to carry relevant information, facts as evident, and the clinical opinion of the practitioner.  To help you fully understand what it means for your health, we urge you to discuss this note with your provider.

## 2025-06-10 ENCOUNTER — PATIENT MESSAGE (OUTPATIENT)
Age: 70
End: 2025-06-10
Payer: COMMERCIAL

## 2025-06-10 ENCOUNTER — PATIENT ROUNDING (BHMG ONLY) (OUTPATIENT)
Age: 70
End: 2025-06-10
Payer: COMMERCIAL

## 2025-06-16 ENCOUNTER — HOSPITAL ENCOUNTER (OUTPATIENT)
Dept: ULTRASOUND IMAGING | Facility: HOSPITAL | Age: 70
Discharge: HOME | End: 2025-06-16
Admitting: INTERNAL MEDICINE
Payer: COMMERCIAL

## 2025-06-16 DIAGNOSIS — R60.0 LOCALIZED EDEMA: ICD-10-CM

## 2025-06-16 PROCEDURE — 93970 EXTREMITY STUDY: CPT

## 2025-06-16 RX ORDER — FESOTERODINE FUMARATE 8 MG/1
8 TABLET, FILM COATED, EXTENDED RELEASE ORAL
Qty: 90 TABLET | Refills: 1 | Status: SHIPPED | OUTPATIENT
Start: 2025-06-16

## 2025-06-16 NOTE — TELEPHONE ENCOUNTER
Rx Refill Note  Requested Prescriptions     Pending Prescriptions Disp Refills    fesoterodine fumarate (TOVIAZ ER) 8 MG tablet sustained-release 24 hour tablet [Pharmacy Med Name: FESOTERODINE ER 8 MG TABLET] 30 tablet 3     Sig: TAKE 1 TABLET BY MOUTH DAILY      Last filled:  01/29/25 w/3  Last office visit with prescribing clinician: 3/28/2025      Next office visit with prescribing clinician: 3/27/2026     Jolene Anthony MA  06/16/25, 09:22 EDT

## 2025-06-19 ENCOUNTER — OFFICE VISIT (OUTPATIENT)
Dept: CARDIOLOGY | Facility: CLINIC | Age: 70
End: 2025-06-19
Payer: COMMERCIAL

## 2025-06-19 VITALS
BODY MASS INDEX: 27.97 KG/M2 | HEART RATE: 71 BPM | WEIGHT: 152 LBS | OXYGEN SATURATION: 99 % | SYSTOLIC BLOOD PRESSURE: 142 MMHG | DIASTOLIC BLOOD PRESSURE: 88 MMHG

## 2025-06-19 DIAGNOSIS — R07.2 PRECORDIAL PAIN: Primary | ICD-10-CM

## 2025-06-19 PROCEDURE — 99204 OFFICE O/P NEW MOD 45 MIN: CPT | Performed by: INTERNAL MEDICINE

## 2025-06-19 RX ORDER — METOPROLOL TARTRATE 25 MG/1
200 TABLET, FILM COATED ORAL ONCE
OUTPATIENT
Start: 2025-06-19 | End: 2025-06-19

## 2025-06-19 RX ORDER — METOPROLOL TARTRATE 50 MG
50 TABLET ORAL
OUTPATIENT
Start: 2025-06-19

## 2025-06-19 RX ORDER — METOPROLOL TARTRATE 1 MG/ML
5 INJECTION, SOLUTION INTRAVENOUS
OUTPATIENT
Start: 2025-06-19

## 2025-06-19 RX ORDER — NITROGLYCERIN 0.4 MG/1
0.8 TABLET SUBLINGUAL
OUTPATIENT
Start: 2025-06-19

## 2025-06-19 RX ORDER — SODIUM CHLORIDE 0.9 % (FLUSH) 0.9 %
10 SYRINGE (ML) INJECTION AS NEEDED
OUTPATIENT
Start: 2025-06-19

## 2025-06-19 RX ORDER — NITROGLYCERIN 0.4 MG/1
0.4 TABLET SUBLINGUAL
OUTPATIENT
Start: 2025-06-19 | End: 2025-06-19

## 2025-06-19 RX ORDER — SODIUM CHLORIDE 0.9 % (FLUSH) 0.9 %
10 SYRINGE (ML) INJECTION EVERY 12 HOURS SCHEDULED
OUTPATIENT
Start: 2025-06-19

## 2025-06-19 RX ORDER — IVABRADINE 5 MG/1
15 TABLET, FILM COATED ORAL ONCE
OUTPATIENT
Start: 2025-06-19 | End: 2025-06-19

## 2025-06-19 RX ORDER — METOPROLOL TARTRATE 25 MG/1
100 TABLET, FILM COATED ORAL ONCE
OUTPATIENT
Start: 2025-06-19

## 2025-06-19 RX ORDER — SODIUM CHLORIDE 9 MG/ML
40 INJECTION, SOLUTION INTRAVENOUS AS NEEDED
OUTPATIENT
Start: 2025-06-19

## 2025-06-19 RX ORDER — METOPROLOL TARTRATE 25 MG/1
50 TABLET, FILM COATED ORAL ONCE
OUTPATIENT
Start: 2025-06-19

## 2025-06-19 RX ORDER — METOPROLOL TARTRATE 25 MG/1
150 TABLET, FILM COATED ORAL ONCE
OUTPATIENT
Start: 2025-06-19

## 2025-06-19 NOTE — PROGRESS NOTES
"Chief Complaint  Establish Care (New Patient), MIXED HYPERLIPIDEMIA, BENIGN ESSENTIAL HTN,  LOCALIZED EDEMA, and BUITRAGO      Subjective   History of Present Illness    Problem List  -Strong family hx of heart disease  -dyspnea upon exertion  -Transient leg swelling.  US of legs negative for DVT  -EKG non specific changes    Mrs. Owens is a 69 year old woman with above hx.  Recent  attended and had some leg swelling and dyspnea which was new to her.  No recurrence.  Also now off minoxidil.  Very, very strong family hx of CAD.  ROS negative except for the above.         Objective   Vital Signs:  Vitals:    25 0954   BP: 142/88   Pulse: 71   SpO2: 99%     Estimated body mass index is 27.97 kg/m² as calculated from the following:    Height as of 25: 157 cm (61.81\").    Weight as of this encounter: 68.9 kg (152 lb).       Physical Exam  HENT:      Head: Normocephalic.   Eyes:      Extraocular Movements: Extraocular movements intact.   Cardiovascular:      Rate and Rhythm: Normal rate and regular rhythm.      Heart sounds: No murmur heard.     No gallop.   Pulmonary:      Breath sounds: Normal breath sounds.   Abdominal:      Palpations: Abdomen is soft.   Musculoskeletal:      Right lower leg: No edema.      Left lower leg: No edema.   Skin:     General: Skin is warm and dry.   Neurological:      General: No focal deficit present.      Mental Status: She is alert.   Psychiatric:         Mood and Affect: Mood normal.               Assessment   -Strong family hx of heart disease  -dyspnea upon exertion  -Transient leg swelling.  US of legs negative for DVT  -EKG non specific changes    Plan   -coronary ct angiogram  -echo    Return in about 6 weeks (around 2025).  Junior Cates MD      "

## 2025-07-16 ENCOUNTER — TELEPHONE (OUTPATIENT)
Facility: HOSPITAL | Age: 70
End: 2025-07-16
Payer: COMMERCIAL

## 2025-07-16 NOTE — TELEPHONE ENCOUNTER
Pt contacted as pre-procedure phone call prior to planned CTA coronary for 7/17/25. Reviewed with patient arrival time of 1030 to main registration,  no caffeine after midnight, please take premedications night before and morning of procedure with a small sip of water as instructed,  recommended,  reviewed procedure instructions and allowed time for questions, and reviewed home medications, allergies, and medical history.

## 2025-07-17 ENCOUNTER — HOSPITAL ENCOUNTER (OUTPATIENT)
Facility: HOSPITAL | Age: 70
Discharge: HOME | End: 2025-07-17
Payer: COMMERCIAL

## 2025-07-17 VITALS
WEIGHT: 151.35 LBS | TEMPERATURE: 97.8 F | RESPIRATION RATE: 15 BRPM | HEART RATE: 58 BPM | DIASTOLIC BLOOD PRESSURE: 61 MMHG | OXYGEN SATURATION: 20 % | HEIGHT: 62 IN | SYSTOLIC BLOOD PRESSURE: 119 MMHG | BODY MASS INDEX: 27.85 KG/M2

## 2025-07-17 DIAGNOSIS — R07.2 PRECORDIAL PAIN: ICD-10-CM

## 2025-07-17 PROCEDURE — 25510000001 IOPAMIDOL PER 1 ML: Performed by: INTERNAL MEDICINE

## 2025-07-17 PROCEDURE — 75574 CT ANGIO HRT W/3D IMAGE: CPT

## 2025-07-17 PROCEDURE — 75574 CT ANGIO HRT W/3D IMAGE: CPT | Performed by: INTERNAL MEDICINE

## 2025-07-17 PROCEDURE — 25010000002 METOPROLOL TARTRATE 5 MG/5ML SOLUTION: Performed by: INTERNAL MEDICINE

## 2025-07-17 RX ORDER — IOPAMIDOL 755 MG/ML
100 INJECTION, SOLUTION INTRAVASCULAR
Status: COMPLETED | OUTPATIENT
Start: 2025-07-17 | End: 2025-07-17

## 2025-07-17 RX ORDER — IVABRADINE 5 MG/1
15 TABLET, FILM COATED ORAL ONCE
Status: DISCONTINUED | OUTPATIENT
Start: 2025-07-17 | End: 2025-07-18 | Stop reason: HOSPADM

## 2025-07-17 RX ORDER — METOPROLOL TARTRATE 100 MG/1
200 TABLET ORAL ONCE
Status: DISCONTINUED | OUTPATIENT
Start: 2025-07-17 | End: 2025-07-18 | Stop reason: HOSPADM

## 2025-07-17 RX ORDER — NITROGLYCERIN 0.4 MG/1
0.4 TABLET SUBLINGUAL
Status: COMPLETED | OUTPATIENT
Start: 2025-07-17 | End: 2025-07-17

## 2025-07-17 RX ORDER — SODIUM CHLORIDE 0.9 % (FLUSH) 0.9 %
10 SYRINGE (ML) INJECTION AS NEEDED
Status: DISCONTINUED | OUTPATIENT
Start: 2025-07-17 | End: 2025-07-18 | Stop reason: HOSPADM

## 2025-07-17 RX ORDER — NITROGLYCERIN 0.4 MG/1
0.8 TABLET SUBLINGUAL
Status: COMPLETED | OUTPATIENT
Start: 2025-07-17 | End: 2025-07-17

## 2025-07-17 RX ORDER — METOPROLOL TARTRATE 25 MG/1
50 TABLET, FILM COATED ORAL
Status: DISCONTINUED | OUTPATIENT
Start: 2025-07-17 | End: 2025-07-18 | Stop reason: HOSPADM

## 2025-07-17 RX ORDER — METOPROLOL TARTRATE 100 MG/1
100 TABLET ORAL ONCE
Status: DISCONTINUED | OUTPATIENT
Start: 2025-07-17 | End: 2025-07-18 | Stop reason: HOSPADM

## 2025-07-17 RX ORDER — METOPROLOL TARTRATE 1 MG/ML
5 INJECTION, SOLUTION INTRAVENOUS
Status: DISCONTINUED | OUTPATIENT
Start: 2025-07-17 | End: 2025-07-18 | Stop reason: HOSPADM

## 2025-07-17 RX ORDER — SODIUM CHLORIDE 0.9 % (FLUSH) 0.9 %
10 SYRINGE (ML) INJECTION EVERY 12 HOURS SCHEDULED
Status: DISCONTINUED | OUTPATIENT
Start: 2025-07-17 | End: 2025-07-18 | Stop reason: HOSPADM

## 2025-07-17 RX ORDER — METOPROLOL TARTRATE 25 MG/1
50 TABLET, FILM COATED ORAL ONCE
Status: DISCONTINUED | OUTPATIENT
Start: 2025-07-17 | End: 2025-07-18 | Stop reason: HOSPADM

## 2025-07-17 RX ORDER — SODIUM CHLORIDE 9 MG/ML
40 INJECTION, SOLUTION INTRAVENOUS AS NEEDED
Status: DISCONTINUED | OUTPATIENT
Start: 2025-07-17 | End: 2025-07-18 | Stop reason: HOSPADM

## 2025-07-17 RX ADMIN — NITROGLYCERIN 0.8 MG: 0.4 TABLET SUBLINGUAL at 11:07

## 2025-07-17 RX ADMIN — IOPAMIDOL 65 ML: 755 INJECTION, SOLUTION INTRAVENOUS at 11:31

## 2025-07-17 RX ADMIN — METOPROLOL TARTRATE 5 MG: 5 INJECTION INTRAVENOUS at 11:19

## 2025-07-18 ENCOUNTER — HOSPITAL ENCOUNTER (OUTPATIENT)
Dept: CT IMAGING | Facility: HOSPITAL | Age: 70
Discharge: HOME | End: 2025-07-18
Admitting: INTERNAL MEDICINE
Payer: COMMERCIAL

## 2025-07-18 DIAGNOSIS — R93.1 ABNORMAL FINDINGS DIAGNOSTIC IMAGING OF HEART AND CORONARY CIRCULATION: ICD-10-CM

## 2025-07-18 DIAGNOSIS — R93.1 ABNORMAL FINDINGS DIAGNOSTIC IMAGING OF HEART AND CORONARY CIRCULATION: Primary | ICD-10-CM

## 2025-07-18 PROCEDURE — 75580 N-INVAS EST C FFR SW ALY CTA: CPT

## 2025-07-31 ENCOUNTER — HOSPITAL ENCOUNTER (OUTPATIENT)
Facility: HOSPITAL | Age: 70
Discharge: HOME | End: 2025-07-31
Admitting: INTERNAL MEDICINE
Payer: COMMERCIAL

## 2025-07-31 ENCOUNTER — OFFICE VISIT (OUTPATIENT)
Dept: CARDIOLOGY | Facility: CLINIC | Age: 70
End: 2025-07-31
Payer: COMMERCIAL

## 2025-07-31 VITALS
HEIGHT: 62 IN | DIASTOLIC BLOOD PRESSURE: 78 MMHG | BODY MASS INDEX: 27.6 KG/M2 | WEIGHT: 150 LBS | SYSTOLIC BLOOD PRESSURE: 147 MMHG

## 2025-07-31 VITALS
SYSTOLIC BLOOD PRESSURE: 124 MMHG | DIASTOLIC BLOOD PRESSURE: 80 MMHG | WEIGHT: 149.1 LBS | HEART RATE: 70 BPM | BODY MASS INDEX: 27.44 KG/M2 | HEIGHT: 62 IN | OXYGEN SATURATION: 98 %

## 2025-07-31 DIAGNOSIS — R07.2 PRECORDIAL PAIN: ICD-10-CM

## 2025-07-31 DIAGNOSIS — E78.5 HYPERLIPIDEMIA, UNSPECIFIED HYPERLIPIDEMIA TYPE: Primary | ICD-10-CM

## 2025-07-31 LAB
AORTIC DIMENSIONLESS INDEX: 0.91 (DI)
AV MEAN PRESS GRAD SYS DOP V1V2: 3 MMHG
AV VMAX SYS DOP: 113 CM/SEC
BH CV ECHO MEAS - AO MAX PG: 5.1 MMHG
BH CV ECHO MEAS - AO ROOT DIAM: 3.2 CM
BH CV ECHO MEAS - AO V2 VTI: 29.7 CM
BH CV ECHO MEAS - AVA(I,D): 2.9 CM2
BH CV ECHO MEAS - EDV(CUBED): 91.1 ML
BH CV ECHO MEAS - EDV(MOD-SP2): 52.6 ML
BH CV ECHO MEAS - EDV(MOD-SP4): 56.2 ML
BH CV ECHO MEAS - EF(MOD-SP2): 63.7 %
BH CV ECHO MEAS - EF(MOD-SP4): 58.5 %
BH CV ECHO MEAS - ESV(CUBED): 24.4 ML
BH CV ECHO MEAS - ESV(MOD-SP2): 19.1 ML
BH CV ECHO MEAS - ESV(MOD-SP4): 23.3 ML
BH CV ECHO MEAS - FS: 35.6 %
BH CV ECHO MEAS - IVS/LVPW: 1.13 CM
BH CV ECHO MEAS - IVSD: 0.9 CM
BH CV ECHO MEAS - LA DIMENSION: 3 CM
BH CV ECHO MEAS - LAT PEAK E' VEL: 10.1 CM/SEC
BH CV ECHO MEAS - LV DIASTOLIC VOL/BSA (35-75): 33.2 CM2
BH CV ECHO MEAS - LV MASS(C)D: 123.1 GRAMS
BH CV ECHO MEAS - LV MAX PG: 3.9 MMHG
BH CV ECHO MEAS - LV MEAN PG: 2 MMHG
BH CV ECHO MEAS - LV SYSTOLIC VOL/BSA (12-30): 13.8 CM2
BH CV ECHO MEAS - LV V1 MAX: 99.2 CM/SEC
BH CV ECHO MEAS - LV V1 VTI: 27.1 CM
BH CV ECHO MEAS - LVIDD: 4.5 CM
BH CV ECHO MEAS - LVIDS: 2.9 CM
BH CV ECHO MEAS - LVOT AREA: 3.1 CM2
BH CV ECHO MEAS - LVOT DIAM: 2 CM
BH CV ECHO MEAS - LVPWD: 0.8 CM
BH CV ECHO MEAS - MED PEAK E' VEL: 6 CM/SEC
BH CV ECHO MEAS - MV A MAX VEL: 126 CM/SEC
BH CV ECHO MEAS - MV DEC SLOPE: 271 CM/SEC2
BH CV ECHO MEAS - MV DEC TIME: 0.29 SEC
BH CV ECHO MEAS - MV E MAX VEL: 79.1 CM/SEC
BH CV ECHO MEAS - MV E/A: 0.63
BH CV ECHO MEAS - MV MAX PG: 6.5 MMHG
BH CV ECHO MEAS - MV MEAN PG: 2 MMHG
BH CV ECHO MEAS - MV V2 VTI: 38.3 CM
BH CV ECHO MEAS - MVA(VTI): 2.22 CM2
BH CV ECHO MEAS - PA ACC TIME: 0.17 SEC
BH CV ECHO MEAS - PA V2 MAX: 85.2 CM/SEC
BH CV ECHO MEAS - RAP SYSTOLE: 3 MMHG
BH CV ECHO MEAS - RVSP: 25 MMHG
BH CV ECHO MEAS - SV(LVOT): 85.1 ML
BH CV ECHO MEAS - SV(MOD-SP2): 33.5 ML
BH CV ECHO MEAS - SV(MOD-SP4): 32.9 ML
BH CV ECHO MEAS - SVI(LVOT): 50.3 ML/M2
BH CV ECHO MEAS - SVI(MOD-SP2): 19.8 ML/M2
BH CV ECHO MEAS - SVI(MOD-SP4): 19.4 ML/M2
BH CV ECHO MEAS - TAPSE (>1.6): 2.33 CM
BH CV ECHO MEAS - TR MAX PG: 22 MMHG
BH CV ECHO MEAS - TR MAX VEL: 237 CM/SEC
BH CV ECHO MEASUREMENTS AVERAGE E/E' RATIO: 9.83
BH CV VAS BP RIGHT ARM: NORMAL MMHG
BH CV XLRA - RV BASE: 2.8 CM
BH CV XLRA - RV LENGTH: 5.8 CM
BH CV XLRA - RV MID: 2.2 CM
BH CV XLRA - TDI S': 11.1 CM/SEC
IVRT: 102 MS
LEFT ATRIUM VOLUME INDEX: 13.4 ML/M2
LV EF BIPLANE MOD: 59 %

## 2025-07-31 PROCEDURE — 93306 TTE W/DOPPLER COMPLETE: CPT | Performed by: INTERNAL MEDICINE

## 2025-07-31 PROCEDURE — 93306 TTE W/DOPPLER COMPLETE: CPT

## 2025-07-31 PROCEDURE — 99214 OFFICE O/P EST MOD 30 MIN: CPT | Performed by: INTERNAL MEDICINE

## 2025-07-31 NOTE — PROGRESS NOTES
"Chief Complaint  Chest Pain (Pt denies chest pain and shortness of breath. )      Subjective   History of Present Illness    Problem List  -Strong family hx of heart disease, NOCAD, CCS 0, plaque volume 79 mm3  -dyspnea upon exertion  -Transient leg swelling.  US of legs negative for DVT  -PFO present  -echo LVEF preserved, possible bicuspid aortic valve on further review  -EKG non specific changes    Mrs. Owens is a 69 year old woman with above hx.  Recent  attended and had some leg swelling and dyspnea which was new to her.  No recurrence.  Also now off minoxidil.  Very, very strong family hx of CAD.  ROS negative except for the above.      Update 25  Discussed testing.  No complaints.         Objective   Vital Signs:  Vitals:    25 1324   BP: 124/80   Pulse: 70   SpO2: 98%     Estimated body mass index is 27.27 kg/m² as calculated from the following:    Height as of this encounter: 157.5 cm (62\").    Weight as of this encounter: 67.6 kg (149 lb 1.6 oz).       Physical Exam  HENT:      Head: Normocephalic.   Eyes:      Extraocular Movements: Extraocular movements intact.   Cardiovascular:      Rate and Rhythm: Normal rate and regular rhythm.      Heart sounds: No murmur heard.     No gallop.   Pulmonary:      Breath sounds: Normal breath sounds.   Abdominal:      Palpations: Abdomen is soft.   Musculoskeletal:      Right lower leg: No edema.      Left lower leg: No edema.   Skin:     General: Skin is warm and dry.   Neurological:      General: No focal deficit present.      Mental Status: She is alert.   Psychiatric:         Mood and Affect: Mood normal.               Assessment   -Strong family hx of heart disease, NOCAD, CCS 0, plaque volume 79 mm3  -dyspnea upon exertion  -Transient leg swelling.  US of legs negative for DVT  -PFO present  -echo LVEF preserved, possible bicuspid aortic valve on further review  -EKG non specific changes    Plan   -cont. Simvastatin.  Agree with going up to 40 " mg.  Blood work in 3 months.      Return in about 1 year (around 7/31/2026).  Junior Cates MD

## 2025-08-01 RX ORDER — ERGOCALCIFEROL 1.25 MG/1
50000 CAPSULE, LIQUID FILLED ORAL
Qty: 12 CAPSULE | Refills: 0 | Status: SHIPPED | OUTPATIENT
Start: 2025-08-01

## 2025-08-01 NOTE — TELEPHONE ENCOUNTER
Rx Refill Note  Requested Prescriptions     Pending Prescriptions Disp Refills    vitamin D (ERGOCALCIFEROL) 1.25 MG (99575 UT) capsule capsule 12 capsule 1     Sig: Take 1 capsule by mouth Every 7 (Seven) Days.      Last office visit with prescribing clinician: 6/2/2025      Next office visit with prescribing clinician: 10/7/2025   {  Sandra Mc MA  08/01/25, 16:23 EDT

## 2025-08-18 ENCOUNTER — TELEPHONE (OUTPATIENT)
Dept: ENDOCRINOLOGY | Facility: CLINIC | Age: 70
End: 2025-08-18
Payer: COMMERCIAL